# Patient Record
Sex: MALE | Race: WHITE | Employment: FULL TIME | URBAN - METROPOLITAN AREA
[De-identification: names, ages, dates, MRNs, and addresses within clinical notes are randomized per-mention and may not be internally consistent; named-entity substitution may affect disease eponyms.]

---

## 2019-02-27 ENCOUNTER — OFFICE VISIT (OUTPATIENT)
Dept: FAMILY MEDICINE CLINIC | Age: 54
End: 2019-02-27
Payer: COMMERCIAL

## 2019-02-27 VITALS
WEIGHT: 215 LBS | HEIGHT: 68 IN | DIASTOLIC BLOOD PRESSURE: 82 MMHG | BODY MASS INDEX: 32.58 KG/M2 | HEART RATE: 101 BPM | RESPIRATION RATE: 16 BRPM | OXYGEN SATURATION: 95 % | SYSTOLIC BLOOD PRESSURE: 124 MMHG | TEMPERATURE: 96.6 F

## 2019-02-27 DIAGNOSIS — Z00.00 ROUTINE GENERAL MEDICAL EXAMINATION AT A HEALTH CARE FACILITY: Primary | ICD-10-CM

## 2019-02-27 DIAGNOSIS — K14.8 TONGUE LESION: ICD-10-CM

## 2019-02-27 DIAGNOSIS — R53.83 OTHER FATIGUE: ICD-10-CM

## 2019-02-27 DIAGNOSIS — K13.70 MOUTH LESION: ICD-10-CM

## 2019-02-27 DIAGNOSIS — Z13.220 SCREENING FOR HYPERLIPIDEMIA: ICD-10-CM

## 2019-02-27 DIAGNOSIS — R40.0 SOMNOLENCE: ICD-10-CM

## 2019-02-27 DIAGNOSIS — Z23 NEED FOR PROPHYLACTIC VACCINATION AND INOCULATION AGAINST VARICELLA: ICD-10-CM

## 2019-02-27 DIAGNOSIS — E66.09 CLASS 1 OBESITY DUE TO EXCESS CALORIES WITH SERIOUS COMORBIDITY AND BODY MASS INDEX (BMI) OF 32.0 TO 32.9 IN ADULT: ICD-10-CM

## 2019-02-27 DIAGNOSIS — R06.83 SNORING: ICD-10-CM

## 2019-02-27 PROCEDURE — 99213 OFFICE O/P EST LOW 20 MIN: CPT | Performed by: INTERNAL MEDICINE

## 2019-02-27 PROCEDURE — 99396 PREV VISIT EST AGE 40-64: CPT | Performed by: INTERNAL MEDICINE

## 2019-02-27 ASSESSMENT — PATIENT HEALTH QUESTIONNAIRE - PHQ9
SUM OF ALL RESPONSES TO PHQ QUESTIONS 1-9: 0
SUM OF ALL RESPONSES TO PHQ QUESTIONS 1-9: 0
2. FEELING DOWN, DEPRESSED OR HOPELESS: 0
1. LITTLE INTEREST OR PLEASURE IN DOING THINGS: 0
SUM OF ALL RESPONSES TO PHQ9 QUESTIONS 1 & 2: 0

## 2019-02-28 ASSESSMENT — ENCOUNTER SYMPTOMS
DIARRHEA: 0
CHEST TIGHTNESS: 0
SHORTNESS OF BREATH: 0
STRIDOR: 0
CONSTIPATION: 0
ABDOMINAL PAIN: 0
WHEEZING: 0
CHOKING: 0
BACK PAIN: 0
COUGH: 0

## 2019-03-01 LAB
ALBUMIN SERPL-MCNC: 4.4 G/DL
ALP BLD-CCNC: 96 U/L
ALT SERPL-CCNC: 51 U/L
ANION GAP SERPL CALCULATED.3IONS-SCNC: 9 MMOL/L
AST SERPL-CCNC: 24 U/L
BASOPHILS ABSOLUTE: NORMAL /ΜL
BASOPHILS RELATIVE PERCENT: NORMAL %
BILIRUB SERPL-MCNC: 0.7 MG/DL (ref 0.1–1.4)
BUN BLDV-MCNC: 13 MG/DL
CALCIUM SERPL-MCNC: 9.3 MG/DL
CHLORIDE BLD-SCNC: 102 MMOL/L
CHOLESTEROL, FASTING: 187
CO2: 28 MMOL/L
CREAT SERPL-MCNC: 0.98 MG/DL
EOSINOPHILS ABSOLUTE: NORMAL /ΜL
EOSINOPHILS RELATIVE PERCENT: NORMAL %
GFR CALCULATED: ABNORMAL
GLUCOSE BLD-MCNC: 210 MG/DL
HCT VFR BLD CALC: NORMAL % (ref 41–53)
HDLC SERPL-MCNC: 26 MG/DL (ref 35–70)
HEMOGLOBIN: NORMAL G/DL (ref 13.5–17.5)
LDL CHOLESTEROL CALCULATED: 123 MG/DL (ref 0–160)
LYMPHOCYTES ABSOLUTE: NORMAL /ΜL
LYMPHOCYTES RELATIVE PERCENT: NORMAL %
MCH RBC QN AUTO: NORMAL PG
MCHC RBC AUTO-ENTMCNC: NORMAL G/DL
MCV RBC AUTO: NORMAL FL
MONOCYTES ABSOLUTE: NORMAL /ΜL
MONOCYTES RELATIVE PERCENT: NORMAL %
NEUTROPHILS ABSOLUTE: NORMAL /ΜL
NEUTROPHILS RELATIVE PERCENT: NORMAL %
PLATELET # BLD: NORMAL K/ΜL
PMV BLD AUTO: NORMAL FL
POTASSIUM SERPL-SCNC: 4.2 MMOL/L
PROSTATE SPECIFIC ANTIGEN: 1.1 NG/ML
RBC # BLD: NORMAL 10^6/ΜL
SODIUM BLD-SCNC: 139 MMOL/L
TOTAL PROTEIN: 6.9
TRIGLYCERIDE, FASTING: 190
WBC # BLD: NORMAL 10^3/ML

## 2019-03-08 DIAGNOSIS — Z00.00 ROUTINE GENERAL MEDICAL EXAMINATION AT A HEALTH CARE FACILITY: ICD-10-CM

## 2019-03-08 DIAGNOSIS — Z13.220 SCREENING FOR HYPERLIPIDEMIA: ICD-10-CM

## 2019-03-10 DIAGNOSIS — R73.9 HYPERGLYCEMIA: Primary | ICD-10-CM

## 2019-03-11 ENCOUNTER — TELEPHONE (OUTPATIENT)
Dept: FAMILY MEDICINE CLINIC | Age: 54
End: 2019-03-11

## 2019-07-17 ENCOUNTER — OFFICE VISIT (OUTPATIENT)
Dept: FAMILY MEDICINE CLINIC | Age: 54
End: 2019-07-17
Payer: COMMERCIAL

## 2019-07-17 ENCOUNTER — HOSPITAL ENCOUNTER (OUTPATIENT)
Age: 54
Setting detail: SPECIMEN
Discharge: HOME | End: 2019-07-17
Payer: COMMERCIAL

## 2019-07-17 VITALS
HEART RATE: 92 BPM | OXYGEN SATURATION: 94 % | WEIGHT: 220.2 LBS | HEIGHT: 68 IN | SYSTOLIC BLOOD PRESSURE: 130 MMHG | DIASTOLIC BLOOD PRESSURE: 88 MMHG | TEMPERATURE: 97.7 F | RESPIRATION RATE: 16 BRPM | BODY MASS INDEX: 33.37 KG/M2

## 2019-07-17 DIAGNOSIS — G47.33 OBSTRUCTIVE SLEEP APNEA: Primary | ICD-10-CM

## 2019-07-17 DIAGNOSIS — R73.9 HYPERGLYCEMIA: ICD-10-CM

## 2019-07-17 PROCEDURE — 99213 OFFICE O/P EST LOW 20 MIN: CPT | Performed by: INTERNAL MEDICINE

## 2019-07-17 ASSESSMENT — ENCOUNTER SYMPTOMS
DIARRHEA: 0
TROUBLE SWALLOWING: 0
SHORTNESS OF BREATH: 0
NAUSEA: 0
APNEA: 1
BACK PAIN: 0
CHOKING: 0
ABDOMINAL PAIN: 0
CONSTIPATION: 0
VOICE CHANGE: 0
STRIDOR: 0
COUGH: 0
VOMITING: 0
BLOOD IN STOOL: 0

## 2019-07-18 LAB
ESTIMATED AVERAGE GLUCOSE: 258 MG/DL
HBA1C MFR BLD: 10.6 % (ref 4–6)

## 2019-07-18 NOTE — PROGRESS NOTES
sleep study and sleep medicine  Dx; severe sleep apnea    Given cpap script but we will fax one to [facility] as well to get process rolling   Will likely need to fill out more paper work   Will try to get nasal device approved   Pt would be great candidate for this     Pt will also check A1C  Reprinted out lab   Will jhonatan with results   Reviewed depending pt eager to adjust diet and /lifestyle /exercise  May need dietician referral and diabetic education to [facility]   Will cut out all po, processed foods and can recheck a1c here in office/script ie lab slip in 3 months   Jhonatan with q/c    Patientgiven educational materials - see patient instructions. Discussed use, benefit,and side effects of prescribed medications. All patient questions answered. Ptvoiced understanding. Reviewed health maintenance. Instructed to continue currentmedications, diet and exercise. Patient agreed with treatment plan. Follow up asdirected.      Electronically signed by Kerrie Salazar DO on 7/17/2019 at 10:26 PM

## 2019-07-25 DIAGNOSIS — G47.33 OBSTRUCTIVE SLEEP APNEA: Primary | ICD-10-CM

## 2019-11-13 ENCOUNTER — TELEPHONE (OUTPATIENT)
Dept: FAMILY MEDICINE CLINIC | Age: 54
End: 2019-11-13

## 2019-11-22 ENCOUNTER — OFFICE VISIT (OUTPATIENT)
Dept: FAMILY MEDICINE CLINIC | Age: 54
End: 2019-11-22
Payer: COMMERCIAL

## 2019-11-22 VITALS
BODY MASS INDEX: 32.49 KG/M2 | HEIGHT: 68 IN | RESPIRATION RATE: 16 BRPM | SYSTOLIC BLOOD PRESSURE: 126 MMHG | WEIGHT: 214.4 LBS | TEMPERATURE: 96.2 F | DIASTOLIC BLOOD PRESSURE: 80 MMHG | HEART RATE: 88 BPM | OXYGEN SATURATION: 95 %

## 2019-11-22 DIAGNOSIS — E13.9 OTHER SPECIFIED DIABETES MELLITUS WITHOUT COMPLICATION, WITHOUT LONG-TERM CURRENT USE OF INSULIN (HCC): Primary | ICD-10-CM

## 2019-11-22 DIAGNOSIS — G47.33 OBSTRUCTIVE SLEEP APNEA: ICD-10-CM

## 2019-11-22 LAB — HBA1C MFR BLD: 10.9 %

## 2019-11-22 PROCEDURE — 82044 UR ALBUMIN SEMIQUANTITATIVE: CPT | Performed by: INTERNAL MEDICINE

## 2019-11-22 PROCEDURE — 83036 HEMOGLOBIN GLYCOSYLATED A1C: CPT | Performed by: INTERNAL MEDICINE

## 2019-11-22 PROCEDURE — 99213 OFFICE O/P EST LOW 20 MIN: CPT | Performed by: INTERNAL MEDICINE

## 2019-11-22 ASSESSMENT — ENCOUNTER SYMPTOMS
TROUBLE SWALLOWING: 0
CONSTIPATION: 0
CHEST TIGHTNESS: 0
SHORTNESS OF BREATH: 0
PHOTOPHOBIA: 0
VOICE CHANGE: 0
ABDOMINAL PAIN: 0
VISUAL CHANGE: 0
WHEEZING: 0
CHOKING: 0
STRIDOR: 0
COUGH: 0
DIARRHEA: 0

## 2019-12-03 ENCOUNTER — HOSPITAL ENCOUNTER (OUTPATIENT)
Dept: DIABETES SERVICES | Age: 54
Setting detail: THERAPIES SERIES
Discharge: HOME | End: 2019-12-03
Payer: COMMERCIAL

## 2019-12-03 PROCEDURE — G0108 DIAB MANAGE TRN  PER INDIV: HCPCS

## 2019-12-03 SDOH — ECONOMIC STABILITY: FOOD INSECURITY: ADDITIONAL INFORMATION: NO

## 2019-12-03 ASSESSMENT — PROBLEM AREAS IN DIABETES QUESTIONNAIRE (PAID)
FEELING SCARED WHEN YOU THINK ABOUT LIVING WITH DIABETES: 1
PAID-5 TOTAL SCORE: 1
FEELING THAT DIABETES IS TAKING UP TOO MUCH OF YOUR MENTAL AND PHYSICAL ENERGY EVERY DAY: 0
FEELING DEPRESSED WHEN YOU THINK ABOUT LIVING WITH DIABETES: 0
WORRYING ABOUT THE FUTURE AND THE POSSIBILITY OF SERIOUS COMPLICATIONS: 0
COPING WITH COMPLICATIONS OF DIABETES: 0

## 2020-01-03 ENCOUNTER — OFFICE VISIT (OUTPATIENT)
Dept: FAMILY MEDICINE CLINIC | Age: 55
End: 2020-01-03
Payer: COMMERCIAL

## 2020-01-03 VITALS
WEIGHT: 215.4 LBS | TEMPERATURE: 97.7 F | BODY MASS INDEX: 32.64 KG/M2 | HEIGHT: 68 IN | SYSTOLIC BLOOD PRESSURE: 132 MMHG | DIASTOLIC BLOOD PRESSURE: 80 MMHG | OXYGEN SATURATION: 96 % | RESPIRATION RATE: 16 BRPM | HEART RATE: 72 BPM

## 2020-01-03 LAB — HBA1C MFR BLD: 9.2 %

## 2020-01-03 PROCEDURE — 99213 OFFICE O/P EST LOW 20 MIN: CPT | Performed by: INTERNAL MEDICINE

## 2020-01-03 PROCEDURE — 83036 HEMOGLOBIN GLYCOSYLATED A1C: CPT | Performed by: INTERNAL MEDICINE

## 2020-01-03 PROCEDURE — 3046F HEMOGLOBIN A1C LEVEL >9.0%: CPT | Performed by: INTERNAL MEDICINE

## 2020-01-03 ASSESSMENT — ENCOUNTER SYMPTOMS
BLOOD IN STOOL: 0
CHOKING: 0
DIARRHEA: 0
COUGH: 0
STRIDOR: 0
CONSTIPATION: 0
ABDOMINAL DISTENTION: 0
SHORTNESS OF BREATH: 0
NAUSEA: 0
CHEST TIGHTNESS: 0
ABDOMINAL PAIN: 0

## 2020-01-03 ASSESSMENT — PATIENT HEALTH QUESTIONNAIRE - PHQ9
SUM OF ALL RESPONSES TO PHQ QUESTIONS 1-9: 0
2. FEELING DOWN, DEPRESSED OR HOPELESS: 0
SUM OF ALL RESPONSES TO PHQ9 QUESTIONS 1 & 2: 0
SUM OF ALL RESPONSES TO PHQ QUESTIONS 1-9: 0
1. LITTLE INTEREST OR PLEASURE IN DOING THINGS: 0

## 2020-01-29 RX ORDER — LANCETS 30 GAUGE
1 EACH MISCELLANEOUS DAILY
Qty: 100 EACH | Refills: 5 | Status: SHIPPED | OUTPATIENT
Start: 2020-01-29

## 2020-01-29 RX ORDER — BLOOD-GLUCOSE METER
1 KIT MISCELLANEOUS DAILY
Qty: 1 KIT | Refills: 0 | Status: SHIPPED | OUTPATIENT
Start: 2020-01-29

## 2020-02-26 LAB
AVERAGE GLUCOSE: 148
HBA1C MFR BLD: 6.8 %

## 2020-03-04 ENCOUNTER — HOSPITAL ENCOUNTER (OUTPATIENT)
Dept: DIABETES SERVICES | Age: 55
Setting detail: THERAPIES SERIES
Discharge: HOME | End: 2020-03-04
Payer: COMMERCIAL

## 2020-03-04 PROCEDURE — G0109 DIAB MANAGE TRN IND/GROUP: HCPCS

## 2020-03-05 NOTE — PROGRESS NOTES
Diabetes Self- Management Education Program Assessment -   Also see Diabetic Screening  Patient, Britany Carrera,  here for diabetes self-management education  visit/ assessment. Today's visit was in an individual setting. Diet History :  Diet Questionnaire and typical meal /portion sheet completed  [x]Yes  [] NO    Goals setting:  Goal work sheet completed  [x]Yes  [] NO  (SEE  EDUCATION AND GOALS FLOWSHEET)    MEDICAL HISTORY:  No past medical history on file. Family History   Problem Relation Age of Onset    No Known Problems Mother     No Known Problems Father      Patient has no known allergies. Immunization History   Administered Date(s) Administered    Influenza Virus Vaccine 10/07/2014, 10/07/2018, 01/18/2020    Influenza, Quadv, IM, PF (6 mo and older Fluzone, Flulaval, Fluarix, and 3 yrs and older Afluria) 09/19/2018, 09/24/2019    Tdap (Boostrix, Adacel) 10/07/2014    Zoster Recombinant (Shingrix) 05/10/2019     Current Medications  Current Outpatient Medications   Medication Sig Dispense Refill    glucose monitoring kit (FREESTYLE) monitoring kit 1 kit by Does not apply route daily 1 kit 0    blood glucose test strips (ACCU-CHEK COMPACT TEST DRUM) strip 1 each by In Vitro route daily As needed.  100 each 3    Lancets MISC 1 each by Does not apply route daily 100 each 5    metFORMIN (GLUCOPHAGE) 500 MG tablet Take 1 tablet by mouth 2 times daily (with meals) 60 tablet 5    Respiratory Therapy Supplies KEILY 1 Device by Does not apply route nightly NASAL CPAP per recommendations of sleep medicine physician Dx : severe sleep apnea 1 Device 0     No current facility-administered medications for this encounter.    :     Comments:  Allergies:  No Known Allergies  Diabetes 5  / Health Status    A1C blood level - at goal < 7%   Lab Results   Component Value Date    LABA1C 6.8 02/26/2020    LABA1C 9.2 01/03/2020    LABA1C 10.9 11/22/2019     Lab Results   Component Value Date    LDLCALC 123 03/01/2019    CREATININE 0.98 03/01/2019       Blood pressure ( 130/ 80)  Or less  BP Readings from Last 3 Encounters:   01/03/20 132/80   11/22/19 126/80   07/17/19 130/88        Cholesterol ( LDL under  100)   Lab Results   Component Value Date    LDLCALC 123 03/01/2019       4 . Smoking ? []Yes   [x]No    5. Taking an Asprin daily? []Yes   [x]No          Diabetes Self- Management Education Record    Participant Name: Eliezer Palma  Referring Provider: Deng Grullon DO   Assessment/Evaluation Ratings:  1=Needs Instruction   4=Demonstrates Understanding/Competency  2=Needs Review   NC=Not Covered    3=Comprehends Key Points  N/A=Not Applicable  Topics/Learning Objectives Pre-session Assess Date:  12/3/19 rs Instr. Date Reinforce Date Post- session Eval Comments   Diabetes disease process & Treatment process: Define diabetes & pre-diabetes; Identify own type of diabetes; role of the pancreas; signs/symptoms; diagnostic criteria; prevention & treatment options; contributing factors. 1 3/4/20 MARILEE   Was told BG up last fall 2018 at work place health screen - followed up with PCP July 2019 and A1C was 10.6 - follow up in Nov and A1c was 10.9% - stated no health changes and did not feel like he had any thing wrong with health but now more accepting and see need to make lifestyle changes - hopes with lifestyle change Diabetes will be controlled   Incorporating nutritional management into lifestyle: Describe effect of type, amount & timing of food on blood glucose; Describe basic meal planning techniques & current nutrition guideline   1  He stated he was \"eating bad\" most of life - drinking pop mid day at work and out to eat meals and large portions - he had had weight gain over time. He had started to cut back on regular pop and now just occasional mid day at work or on weekend will have regular pop.      Prior big meat and starchy potatoes - rice and bread ect - now trying to add in non starchy veggies  / eats out meals few times per week also     Patterns of meals - BK daily and Lunch at regular times - but dinner is late 8 -9 pm .    snacks mid day - now changed to nuts     Discussed how to make smoothies with less fruit - try 1 cup berries + greek yougart ( was having 2 whole banana + apple + berries in am smoothie - about 180 gram CHO in BK)       What to eat - Food groups, When to eat - timing of meals and snacks, and How much to eat - portions control.-  Reviewed in general concepts and given CHO counting and meal planning booklet and the general target of 2000 ada / 60 - 75 grams CHO with meals as well as how to use plate method for portion control and add in more low CHO veggies-  Given web site to [employer] 12/3/19        calories/ day   CHO choices/ meal   CHO choices/  day   grams of protein /day   gram of fat /day     Correctly read food labels & demonstrate CHO counting & portion control with personalized meal plan. Identify dining out strategies, & dietary sick day guidelines. 1  Starting to look at total CHO - did not know CHO food groups       Incorporating physical activity into lifestyle:   Verbalize effect of exercise on blood glucose levels; benefits of regular exercise; safety considerations; contraindications; maintenance of activity. 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    1       Monitoring blood glucose, interpreting and using results:  Identify recommended & personal blood glucose targets; importance of testing; testing supplies; HgbA1C target levels; Factors affecting blood glucose; Importance of logging blood glucose levels for pattern recognition; ketone testing; safe lancet disposal.   1 3/4/20 JW   Following A1C only at this time - discussed home BG checks and does not want to start at this time. 3/4/20 JW A1c has dropped to 6.8 % with changes made. Prevention, detection & treatment of acute complications:  Identify symptoms of hyper & hypoglycemia, and prevention & treatment strategies. 1 3/4/20 JW   Stated weight loss  ( highest wt 220 lbs)   Wt Readings from Last 3 Encounters:   01/03/20 215 lb 6.4 oz (97.7 kg)   11/22/19 214 lb 6.4 oz (97.3 kg)   07/17/19 220 lb 3.2 oz (99.9 kg)    no other symptoms of high or low BG        Describe sick day guidelines & indications for  physician notification. Identify short term consequences of poor control. 1       Prevention, detection & treatment of chronic complications:  Define the natural course of diabetes & describe the relationship of blood glucose levels to long term complications of diabetes. Identify preventative measures & standards of care. 1    Need a foot exam this year  Stated family high cholesterol - not on any medications  No B/p or asa     Developing strategies to address psychosocial issues:  Describe feelings about living with diabetes; Describe how stress, depression & anxiety affect blood glucose; Identify coping strategies; Identify support needed & support network available. 1 3/4/20 JW   Coping with diagnosis   Wife is support    Developing strategies to promote health/change behavior: Identify 7 self-care behaviors; Personal health risk factors; Benefits, challenges & strategies for behavioral change;    1         Individualized goal selection. My goal , to help me improve my health, I will: 1. Health eating :   Follow plate method for meal planning and portion control and [facility] - EYE, FOOT, CARDIAC, WOUND, WEIGHT MANAGEMENT        []Other  Coby Clark, RD, LD CDE

## 2020-03-11 ENCOUNTER — HOSPITAL ENCOUNTER (OUTPATIENT)
Dept: DIABETES SERVICES | Age: 55
Setting detail: THERAPIES SERIES
Discharge: HOME | End: 2020-03-11
Payer: COMMERCIAL

## 2020-03-12 PROCEDURE — G0109 DIAB MANAGE TRN IND/GROUP: HCPCS

## 2020-03-12 NOTE — PROGRESS NOTES
meals few times per week also     Patterns of meals - BK daily and Lunch at regular times - but dinner is late 8 -9 pm .    snacks mid day - now changed to nuts     Discussed how to make smoothies with less fruit - try 1 cup berries + greek yougart ( was having 2 whole banana + apple + berries in am smoothie - about 180 gram CHO in BK)    3/11/20 JW   What to eat - Food groups, When to eat - timing of meals and snacks, and How much to eat - portions control.-  Reviewed in general concepts and given CHO counting and meal planning booklet and the general target of 2000 ada / 60 - 75 grams CHO with meals as well as how to use plate method for portion control and add in more low CHO veggies-  Given web site to [employer] 12/3/19   1800 calories/ day   CHO choices/ meal 4,0-1,4,1,4,1 or move hs snack to before supper if supper late. CHO choices/  day   grams of protein /day   gram of fat /day     Correctly read food labels & demonstrate CHO counting & portion control with personalized meal plan. Identify dining out strategies, & dietary sick day guidelines. 1  Starting to look at total CHO - did not know CHO food groups 3/11/20 JW      Incorporating physical activity into lifestyle:   Verbalize effect of exercise on blood glucose levels; benefits of regular exercise; safety considerations; contraindications; maintenance of activity. 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    1

## 2020-03-18 ENCOUNTER — APPOINTMENT (OUTPATIENT)
Dept: DIABETES SERVICES | Age: 55
End: 2020-03-18
Payer: COMMERCIAL

## 2020-04-08 ENCOUNTER — TELEPHONE (OUTPATIENT)
Dept: FAMILY MEDICINE CLINIC | Age: 55
End: 2020-04-08

## 2020-04-08 NOTE — TELEPHONE ENCOUNTER
Called patient to switch to VV and he said he didn't really need it but wants you to put an order for a1c and cholesterol

## 2020-04-10 ENCOUNTER — HOSPITAL ENCOUNTER (OUTPATIENT)
Age: 55
Setting detail: SPECIMEN
Discharge: HOME | End: 2020-04-10
Payer: COMMERCIAL

## 2020-04-10 LAB
CHOLESTEROL/HDL RATIO: 5.2
CHOLESTEROL: 203 MG/DL
HDLC SERPL-MCNC: 39 MG/DL
LDL CHOLESTEROL: 133 MG/DL (ref 0–130)
TRIGL SERPL-MCNC: 154 MG/DL
VLDLC SERPL CALC-MCNC: ABNORMAL MG/DL (ref 1–30)

## 2020-04-12 LAB
ESTIMATED AVERAGE GLUCOSE: 131 MG/DL
HBA1C MFR BLD: 6.2 % (ref 4–6)

## 2020-04-13 RX ORDER — ATORVASTATIN CALCIUM 20 MG/1
20 TABLET, FILM COATED ORAL DAILY
Qty: 30 TABLET | Refills: 5 | Status: SHIPPED | OUTPATIENT
Start: 2020-04-13 | End: 2020-11-10

## 2020-05-20 LAB — DIABETIC RETINOPATHY: NEGATIVE

## 2020-07-29 ENCOUNTER — TELEPHONE (OUTPATIENT)
Dept: ADMINISTRATIVE | Age: 55
End: 2020-07-29

## 2020-07-29 DIAGNOSIS — E11.9 TYPE 2 DIABETES MELLITUS WITHOUT COMPLICATION, WITHOUT LONG-TERM CURRENT USE OF INSULIN (HCC): Primary | ICD-10-CM

## 2020-07-31 ENCOUNTER — HOSPITAL ENCOUNTER (OUTPATIENT)
Age: 55
Setting detail: SPECIMEN
Discharge: HOME | End: 2020-07-31
Payer: COMMERCIAL

## 2020-07-31 LAB
CHOLESTEROL/HDL RATIO: 4
CHOLESTEROL: 160 MG/DL
HDLC SERPL-MCNC: 40 MG/DL
LDL CHOLESTEROL: 97 MG/DL (ref 0–130)
TRIGL SERPL-MCNC: 115 MG/DL
VLDLC SERPL CALC-MCNC: ABNORMAL MG/DL (ref 1–30)

## 2020-08-02 LAB
ESTIMATED AVERAGE GLUCOSE: 128 MG/DL
HBA1C MFR BLD: 6.1 % (ref 4–6)

## 2020-11-10 RX ORDER — ATORVASTATIN CALCIUM 20 MG/1
TABLET, FILM COATED ORAL
Qty: 30 TABLET | Refills: 4 | Status: SHIPPED | OUTPATIENT
Start: 2020-11-10 | End: 2021-04-26

## 2020-12-04 ENCOUNTER — OFFICE VISIT (OUTPATIENT)
Dept: FAMILY MEDICINE CLINIC | Age: 55
End: 2020-12-04
Payer: COMMERCIAL

## 2020-12-04 ENCOUNTER — HOSPITAL ENCOUNTER (OUTPATIENT)
Age: 55
Setting detail: SPECIMEN
Discharge: HOME | End: 2020-12-04
Payer: COMMERCIAL

## 2020-12-04 VITALS
TEMPERATURE: 97.4 F | SYSTOLIC BLOOD PRESSURE: 122 MMHG | DIASTOLIC BLOOD PRESSURE: 76 MMHG | RESPIRATION RATE: 16 BRPM | WEIGHT: 195 LBS | HEART RATE: 89 BPM | BODY MASS INDEX: 29.55 KG/M2 | OXYGEN SATURATION: 98 % | HEIGHT: 68 IN

## 2020-12-04 PROCEDURE — 99213 OFFICE O/P EST LOW 20 MIN: CPT | Performed by: NURSE PRACTITIONER

## 2020-12-04 ASSESSMENT — ENCOUNTER SYMPTOMS
SHORTNESS OF BREATH: 0
VOMITING: 0
DIARRHEA: 0
EYE PAIN: 0
BACK PAIN: 0
COUGH: 1
NAUSEA: 0
ABDOMINAL PAIN: 0
SORE THROAT: 0
CHEST TIGHTNESS: 1
RHINORRHEA: 1
SINUS PAIN: 0

## 2020-12-04 NOTE — PROGRESS NOTES
[address] [facility]  [address] [facility] [address] [address]  Dept: [phone]  Dept Fax: [phone]    Bobby Almonte is a 63 y.o. male who presents to the urgent care today for his medicalconditions/complaints as noted below. Bobby Almonte is c/o of Chest Congestion (states his 3 grandkids are sick ); Sinusitis; and Fatigue      HPI:         41-year-old male patient presents with complaint of multiple symptoms. Reports for approximately 1 week he has had chest congestion, nasal congestion, rhinorrhea, generalized fatigue and mild chest tightness with slight cough. Denies fevers or chills. Denies chest pains or shortness of breath. Denies vomiting diarrhea. Denies loss of taste smell. Reports positive sick contact with his grandchildren. Treatments tried include NyQuil. Known history of diabetes and hyperlipidemia. Past Medical History:   Diagnosis Date    DM (diabetes mellitus) ([facility] [address]     Hyperlipidemia         Current Outpatient Medications   Medication Sig Dispense Refill    atorvastatin (LIPITOR) 20 MG tablet TAKE ONE TABLET BY MOUTH DAILY 30 tablet 4    metFORMIN (GLUCOPHAGE) 500 MG tablet Take 1 tablet by mouth 2 times daily (with meals) 60 tablet 5    glucose monitoring kit (FREESTYLE) monitoring kit 1 kit by Does not apply route daily 1 kit 0    blood glucose test strips (ACCU-CHEK COMPACT TEST DRUM) strip 1 each by In Vitro route daily As needed. 100 each 3    Lancets MISC 1 each by Does not apply route daily 100 each 5    Respiratory Therapy Supplies KEILY 1 Device by Does not apply route nightly NASAL CPAP per recommendations of sleep medicine physician Dx : severe sleep apnea 1 Device 0     No current facility-administered medications for this visit. No Known Allergies    Subjective:      Review of Systems   Constitutional: Positive for fatigue. Negative for chills and fever. HENT: Positive for congestion and rhinorrhea. Negative for ear pain, sinus pain and sore throat. Eyes: Negative for pain and visual disturbance. Respiratory: Positive for cough and chest tightness. Negative for shortness of breath. Cardiovascular: Negative for chest pain and palpitations. Gastrointestinal: Negative for abdominal pain, diarrhea, nausea and vomiting. Genitourinary: Negative for penile pain and testicular pain. Musculoskeletal: Negative for back pain, joint swelling and neck pain. Skin: Negative for rash. Neurological: Negative for dizziness and light-headedness. Hematological: Does not bruise/bleed easily. All other systems reviewed and are negative. Objective:     Physical Exam  Vitals signs and nursing note reviewed. Constitutional:       General: He is not in acute distress. Appearance: Normal appearance. He is not toxic-appearing. HENT:      Nose: Congestion and rhinorrhea present. Mouth/Throat:      Mouth: Mucous membranes are moist.      Pharynx: No posterior oropharyngeal erythema. Cardiovascular:      Rate and Rhythm: Normal rate. Pulmonary:      Effort: Pulmonary effort is normal.      Breath sounds: Normal breath sounds. Skin:     General: Skin is warm and dry. Neurological:      General: No focal deficit present. Mental Status: He is alert and oriented to person, place, and time. /76 (Site: Left Upper Arm, Position: Sitting, Cuff Size: Large Adult)   Pulse 89   Temp 97.4 °F (36.3 °C) (Tympanic)   Resp 16   Ht 5' 8\" (1.727 m)   Wt 195 lb (88.5 kg)   SpO2 98%   BMI 29.65 kg/m²   Lab Review   No visits with results within 2 Month(s) from this visit.    Latest known visit with results is:   Hospital Outpatient Visit on 07/31/2020   Component Date Value    Cholesterol 07/31/2020 160     HDL 07/31/2020 40*    LDL Cholesterol 07/31/2020 97     Chol/HDL Ratio 07/31/2020 4.0     Triglycerides 07/31/2020 115     VLDL 07/31/2020 NOT REPORTED     Hemoglobin A1C 07/31/2020 6.1*    Estimated Avg Glucose 07/31/2020 128        Assessment:       Diagnosis Orders   1. Nasal congestion  COVID-19 Ambulatory    EKG 12 Lead    XR CHEST STANDARD (2 VW)    D-Dimer, Quantitative   2. Chest tightness  COVID-19 Ambulatory    EKG 12 Lead    XR CHEST STANDARD (2 VW)    D-Dimer, Quantitative       Plan:      Return if symptoms worsen or fail to improve. No orders of the defined types were placed in this encounter. Will order chest xray, ekg, dimer  Recommend isolation pending covid results. Discussed treatment regimen to include rest, hydration, tylenol prn. Discussed deep breathing exercises. Discussed to monitor for progression of symptoms. Follow up as needed. Will contact patient for results  ER for acutely worsening symptoms. Patient given educational materials - see patient instructions. Discussed use, benefit, and side effects of prescribed medications. All patientquestions answered. Pt voiced understanding. This note was transcribed using dictation with Dragon services. Efforts were made to correct any errors but some words may be misinterpreted.      Electronically signed by CLARY Cabrera CNP on 12/4/2020at 9:06 AM

## 2020-12-07 LAB — SARS-COV-2, NAA: NOT DETECTED

## 2021-04-26 RX ORDER — ATORVASTATIN CALCIUM 20 MG/1
TABLET, FILM COATED ORAL
Qty: 30 TABLET | Refills: 3 | Status: SHIPPED | OUTPATIENT
Start: 2021-04-26 | End: 2021-09-10 | Stop reason: SDUPTHER

## 2021-07-26 ENCOUNTER — TELEPHONE (OUTPATIENT)
Dept: FAMILY MEDICINE CLINIC | Age: 56
End: 2021-07-26

## 2021-07-26 DIAGNOSIS — E11.9 DIABETES MELLITUS WITHOUT COMPLICATION (HCC): Primary | ICD-10-CM

## 2021-07-26 DIAGNOSIS — Z12.5 PROSTATE CANCER SCREENING: ICD-10-CM

## 2021-07-26 DIAGNOSIS — Z13.220 LIPID SCREENING: ICD-10-CM

## 2021-07-26 NOTE — TELEPHONE ENCOUNTER
----- Message from Chantal Sharma sent at 7/26/2021 12:39 PM EDT -----  Subject: Referral Request    QUESTIONS   Reason for referral request? Requesting blood work for appointment on the   3rd   Has the physician seen you for this condition before? No   Preferred Specialist (if applicable)? Do you already have an appointment scheduled? No  Additional Information for Provider? Requesting blood work for appointment   on the 3rd  ---------------------------------------------------------------------------  --------------  [address]  What is the best way for the office to contact you? OK to leave message on   voicemail  Preferred Call Back Phone Number?  [phone]

## 2021-07-26 NOTE — TELEPHONE ENCOUNTER
----- Message from Edmond Rubio sent at 7/26/2021  4:49 PM EDT -----  Subject: Message to Provider    QUESTIONS  Information for Provider? Patient missed a call from the office and would   like a call back. ---------------------------------------------------------------------------  --------------  [facility]  What is the best way for the office to contact you? OK to leave message on   voicemail  Preferred Call Back Phone Number?  [phone]  ---------------------------------------------------------------------------  --------------  SCRIPT ANSWERS  undefined

## 2021-07-30 ENCOUNTER — HOSPITAL ENCOUNTER (OUTPATIENT)
Age: 56
Setting detail: SPECIMEN
Discharge: HOME | End: 2021-07-30
Payer: COMMERCIAL

## 2021-07-30 DIAGNOSIS — Z13.220 LIPID SCREENING: ICD-10-CM

## 2021-07-30 DIAGNOSIS — Z12.5 PROSTATE CANCER SCREENING: ICD-10-CM

## 2021-07-30 DIAGNOSIS — E11.9 DIABETES MELLITUS WITHOUT COMPLICATION (HCC): ICD-10-CM

## 2021-07-30 LAB
ABSOLUTE EOS #: 0.16 K/UL (ref 0–0.44)
ABSOLUTE IMMATURE GRANULOCYTE: 0.04 K/UL (ref 0–0.3)
ABSOLUTE LYMPH #: 1.62 K/UL (ref 1.1–3.7)
ABSOLUTE MONO #: 0.61 K/UL (ref 0.1–1.2)
ALBUMIN SERPL-MCNC: 4.3 G/DL (ref 3.5–5.2)
ALBUMIN/GLOBULIN RATIO: 1.7 (ref 1–2.5)
ALP BLD-CCNC: 82 U/L (ref 40–129)
ALT SERPL-CCNC: 32 U/L (ref 5–41)
ANION GAP SERPL CALCULATED.3IONS-SCNC: 14 MMOL/L (ref 9–17)
AST SERPL-CCNC: 18 U/L
BASOPHILS # BLD: 1 % (ref 0–2)
BASOPHILS ABSOLUTE: 0.07 K/UL (ref 0–0.2)
BILIRUB SERPL-MCNC: 0.61 MG/DL (ref 0.3–1.2)
BUN BLDV-MCNC: 13 MG/DL (ref 6–20)
BUN/CREAT BLD: ABNORMAL (ref 9–20)
CALCIUM SERPL-MCNC: 9.4 MG/DL (ref 8.6–10.4)
CHLORIDE BLD-SCNC: 104 MMOL/L (ref 98–107)
CHOLESTEROL/HDL RATIO: 4.2
CHOLESTEROL: 177 MG/DL
CO2: 23 MMOL/L (ref 20–31)
CREAT SERPL-MCNC: 0.88 MG/DL (ref 0.7–1.2)
DIFFERENTIAL TYPE: ABNORMAL
EOSINOPHILS RELATIVE PERCENT: 2 % (ref 1–4)
ESTIMATED AVERAGE GLUCOSE: 154 MG/DL
GFR AFRICAN AMERICAN: >60 ML/MIN
GFR NON-AFRICAN AMERICAN: >60 ML/MIN
GFR SERPL CREATININE-BSD FRML MDRD: ABNORMAL ML/MIN/{1.73_M2}
GFR SERPL CREATININE-BSD FRML MDRD: ABNORMAL ML/MIN/{1.73_M2}
GLUCOSE BLD-MCNC: 145 MG/DL (ref 70–99)
HBA1C MFR BLD: 7 % (ref 4–6)
HCT VFR BLD CALC: 48.5 % (ref 40.7–50.3)
HDLC SERPL-MCNC: 42 MG/DL
HEMOGLOBIN: 15.8 G/DL (ref 13–17)
IMMATURE GRANULOCYTES: 1 %
LDL CHOLESTEROL: 108 MG/DL (ref 0–130)
LYMPHOCYTES # BLD: 24 % (ref 24–43)
MCH RBC QN AUTO: 29.5 PG (ref 25.2–33.5)
MCHC RBC AUTO-ENTMCNC: 32.6 G/DL (ref 28.4–34.8)
MCV RBC AUTO: 90.5 FL (ref 82.6–102.9)
MONOCYTES # BLD: 9 % (ref 3–12)
NRBC AUTOMATED: 0 PER 100 WBC
PDW BLD-RTO: 12 % (ref 11.8–14.4)
PLATELET # BLD: 210 K/UL (ref 138–453)
PLATELET ESTIMATE: ABNORMAL
PMV BLD AUTO: 11 FL (ref 8.1–13.5)
POTASSIUM SERPL-SCNC: 4.6 MMOL/L (ref 3.7–5.3)
PROSTATE SPECIFIC ANTIGEN: 1.12 UG/L
RBC # BLD: 5.36 M/UL (ref 4.21–5.77)
RBC # BLD: ABNORMAL 10*6/UL
SEG NEUTROPHILS: 63 % (ref 36–65)
SEGMENTED NEUTROPHILS ABSOLUTE COUNT: 4.37 K/UL (ref 1.5–8.1)
SODIUM BLD-SCNC: 141 MMOL/L (ref 135–144)
TOTAL PROTEIN: 6.8 G/DL (ref 6.4–8.3)
TRIGL SERPL-MCNC: 137 MG/DL
VLDLC SERPL CALC-MCNC: NORMAL MG/DL (ref 1–30)
WBC # BLD: 6.9 K/UL (ref 3.5–11.3)
WBC # BLD: ABNORMAL 10*3/UL

## 2021-08-03 ENCOUNTER — OFFICE VISIT (OUTPATIENT)
Dept: FAMILY MEDICINE CLINIC | Age: 56
End: 2021-08-03
Payer: COMMERCIAL

## 2021-08-03 VITALS
DIASTOLIC BLOOD PRESSURE: 74 MMHG | RESPIRATION RATE: 18 BRPM | WEIGHT: 210 LBS | OXYGEN SATURATION: 98 % | SYSTOLIC BLOOD PRESSURE: 116 MMHG | HEIGHT: 68 IN | BODY MASS INDEX: 31.83 KG/M2 | HEART RATE: 90 BPM

## 2021-08-03 DIAGNOSIS — E78.5 HYPERLIPIDEMIA, UNSPECIFIED HYPERLIPIDEMIA TYPE: ICD-10-CM

## 2021-08-03 DIAGNOSIS — E11.9 DIABETES MELLITUS WITHOUT COMPLICATION (HCC): Primary | ICD-10-CM

## 2021-08-03 PROCEDURE — 99213 OFFICE O/P EST LOW 20 MIN: CPT | Performed by: NURSE PRACTITIONER

## 2021-08-03 PROCEDURE — 3051F HG A1C>EQUAL 7.0%<8.0%: CPT | Performed by: NURSE PRACTITIONER

## 2021-08-03 RX ORDER — METFORMIN HYDROCHLORIDE 500 MG/1
1000 TABLET, EXTENDED RELEASE ORAL
Qty: 60 TABLET | Refills: 5 | Status: SHIPPED | OUTPATIENT
Start: 2021-08-03 | End: 2022-01-21 | Stop reason: SDUPTHER

## 2021-08-03 ASSESSMENT — ENCOUNTER SYMPTOMS
DIARRHEA: 0
COUGH: 0
VOMITING: 0
NAUSEA: 0
SORE THROAT: 0
SINUS PAIN: 0
ABDOMINAL PAIN: 0
EYE PAIN: 0
SHORTNESS OF BREATH: 0
BACK PAIN: 0

## 2021-08-03 ASSESSMENT — PATIENT HEALTH QUESTIONNAIRE - PHQ9
SUM OF ALL RESPONSES TO PHQ QUESTIONS 1-9: 0
SUM OF ALL RESPONSES TO PHQ QUESTIONS 1-9: 0
SUM OF ALL RESPONSES TO PHQ9 QUESTIONS 1 & 2: 0
1. LITTLE INTEREST OR PLEASURE IN DOING THINGS: 0
SUM OF ALL RESPONSES TO PHQ QUESTIONS 1-9: 0
2. FEELING DOWN, DEPRESSED OR HOPELESS: 0

## 2021-08-03 NOTE — PATIENT INSTRUCTIONS
Patient Education        Learning About Type 2 Diabetes  What is type 2 diabetes? Type 2 diabetes is a condition in which you have too much sugar (glucose) in your blood. Glucose is a type of sugar produced in your body when carbohydrates and other foods are digested. It provides energy to cells throughout the body. Normally, blood sugar levels increase after you eat a meal. When blood sugar rises, cells in the pancreas release insulin, which causes the body to absorb sugar from the blood and lowers the blood sugar level to normal.  When you have type 2 diabetes, sugar stays in the blood rather than entering the body's cells to be used for energy. This results in high blood sugar. It happens when your body can't use insulin the right way. Over time, high blood sugar can harm many parts of the body, such as your eyes, heart, blood vessels, nerves, and kidneys. It can also increase your risk for other health problems (complications). What can you expect with type 2 diabetes? Kimberlyn Foley keep hearing about how important it is to keep your blood sugar within a target range. That's because over time, high blood sugar can lead to serious problems. It can:  · Harm your eyes, nerves, and kidneys. · Damage your blood vessels, leading to heart disease and stroke. · Reduce blood flow and cause nerve damage to parts of your body, especially your feet. This can cause slow healing and pain when you walk. · Make your immune system weak and less able to fight infections. When people hear the word \"diabetes,\" they often think of problems like these. But daily care and treatment can help prevent or delay these problems. The goal is to keep your blood sugar in a target range. That's the best way to reduce your chance of having more problems from diabetes. What are the symptoms? Some people who have type 2 diabetes may not have any symptoms early on.  Many people with the disease don't even know they have it at first. But with in to your [employer] account. Enter D756 in the [facility] box to learn more about \"Learning About Type 2 Diabetes. \"     If you do not have an account, please click on the \"Sign Up Now\" link. Current as of: August 31, 2020               Content Version: 12.9  © 9693-5273 [employer], [employer]. Care instructions adapted under license by [address] If you have questions about a medical condition or this instruction, always ask your healthcare professional. [address] any warranty or liability for your use of this information.

## 2021-08-03 NOTE — PROGRESS NOTES
[facility]  [address] [address]  Dept: [phone]  Dept Fax: [phone]    Jayde Kaufman is a 61 y.o. male who presents today for his medicalconditions/complaints as noted below. Jayde Kaufman is c/o of Established New Doctor and Discuss Labs      HPI:         39-year-old male patient presents with complaints of encounter to establish care. Significant history of type 2 diabetes. Reportedly takes Metformin 500 twice daily. Reports that he misses his evening dose frequently and takes only 500 daily most of the time. Reportedly had been following a close diet however has stopped recently. Patient's weight is up 15 pounds from most recent visit. Most recent A1c increased from 6.1-7.0. History of hyperlipidemia, takes Lipitor 20 mg daily, tolerating well, most recent lipid levels within normal range. Past Medical History:   Diagnosis Date    [facility] ([facility]     Hyperlipidemia         Current Outpatient Medications   Medication Sig Dispense Refill    metFORMIN (GLUCOPHAGE XR) 500 MG extended release tablet Take 2 tablets by mouth daily (with breakfast) 60 tablet 5    atorvastatin (LIPITOR) 20 MG tablet TAKE ONE TABLET BY MOUTH DAILY 30 tablet 3    glucose monitoring kit (FREESTYLE) monitoring kit 1 kit by Does not apply route daily 1 kit 0    blood glucose test strips (ACCU-CHEK COMPACT TEST DRUM) strip 1 each by In Vitro route daily As needed. 100 each 3    Lancets MISC 1 each by Does not apply route daily 100 each 5    Respiratory Therapy Supplies KEILY 1 Device by Does not apply route nightly NASAL CPAP per recommendations of sleep medicine physician Dx : severe sleep apnea 1 Device 0     No current facility-administered medications for this visit. No Known Allergies    Subjective:      Review of Systems   Constitutional: Negative for chills and fatigue.    HENT: Negative for congestion, ear pain, sinus pain and sore throat. Eyes: Negative for pain and visual disturbance. Respiratory: Negative for cough and shortness of breath. Cardiovascular: Negative for chest pain and palpitations. Gastrointestinal: Negative for abdominal pain, diarrhea, nausea and vomiting. Genitourinary: Negative for penile pain and testicular pain. Musculoskeletal: Negative for back pain, joint swelling and neck pain. Skin: Negative for rash. Neurological: Negative for dizziness and light-headedness. Hematological: Does not bruise/bleed easily. All other systems reviewed and are negative.      :Objective     Physical Exam  Vitals and nursing note reviewed. Constitutional:       General: He is not in acute distress. Appearance: Normal appearance. He is not toxic-appearing. HENT:      Mouth/Throat:      Mouth: Mucous membranes are moist.   Cardiovascular:      Rate and Rhythm: Normal rate. Pulmonary:      Effort: Pulmonary effort is normal. No respiratory distress. Breath sounds: Normal breath sounds. Skin:     General: Skin is warm and dry. Neurological:      General: No focal deficit present. Mental Status: He is alert and oriented to person, place, and time.        /74   Pulse 90   Resp 18   Ht 5' 8\" (1.727 m)   Wt 210 lb (95.3 kg)   SpO2 98%   BMI 31.93 kg/m²     Lab Review   Hospital Outpatient Visit on 07/30/2021   Component Date Value    PSA 07/30/2021 1.12     Hemoglobin A1C 07/30/2021 7.0*    Estimated Avg Glucose 07/30/2021 154     Cholesterol 07/30/2021 177     HDL 07/30/2021 42     LDL Cholesterol 07/30/2021 108     Chol/HDL Ratio 07/30/2021 4.2     Triglycerides 07/30/2021 137     VLDL 07/30/2021 NOT REPORTED     Glucose 07/30/2021 145*    BUN 07/30/2021 13     CREATININE 07/30/2021 0.88     Bun/Cre Ratio 07/30/2021 NOT REPORTED     Calcium 07/30/2021 9.4     Sodium 07/30/2021 141     Potassium 07/30/2021 4.6     Chloride 07/30/2021 104     CO2 07/30/2021 23     Anion Gap 07/30/2021 14     Alkaline Phosphatase 07/30/2021 82     ALT 07/30/2021 32     AST 07/30/2021 18     Total Bilirubin 07/30/2021 0.61     Total Protein 07/30/2021 6.8     Albumin 07/30/2021 4.3     Albumin/Globulin Ratio 07/30/2021 1.7     GFR Non- 07/30/2021 >60     GFR  07/30/2021 >60     GFR Comment 07/30/2021          GFR Staging 07/30/2021 NOT REPORTED     WBC 07/30/2021 6.9     RBC 07/30/2021 5.36     Hemoglobin 07/30/2021 15.8     Hematocrit 07/30/2021 48.5     MCV 07/30/2021 90.5     MCH 07/30/2021 29.5     MCHC 07/30/2021 32.6     RDW 07/30/2021 12.0     Platelets 57/37/7172 210     MPV 07/30/2021 11.0     NRBC Automated 07/30/2021 0.0     Differential Type 07/30/2021 NOT REPORTED     Seg Neutrophils 07/30/2021 63     Lymphocytes 07/30/2021 24     Monocytes 07/30/2021 9     Eosinophils % 07/30/2021 2     Basophils 07/30/2021 1     Immature Granulocytes 07/30/2021 1*    Segs Absolute 07/30/2021 4.37     Absolute Lymph # 07/30/2021 1.62     Absolute Mono # 07/30/2021 0.61     Absolute Eos # 07/30/2021 0.16     Basophils Absolute 07/30/2021 0.07     Absolute Immature Granul* 07/30/2021 0.04     WBC Morphology 07/30/2021 NOT REPORTED     RBC Morphology 07/30/2021 NOT REPORTED     Platelet Estimate 46/85/3606 NOT REPORTED        Assessment and Plan      1. Diabetes mellitus without complication (HCC)  -     metFORMIN (GLUCOPHAGE XR) 500 MG extended release tablet; Take 2 tablets by mouth daily (with breakfast), Disp-60 tablet, R-5Normal  -     Hemoglobin A1C; Future  2. Hyperlipidemia, unspecified hyperlipidemia type      We'll adjust Metformin dosing and instead of taking 500 twice daily, will adjust to 1000 XR once daily  Recheck A1c and 6 months continue lifestyle changes                  No results found for this visit on 08/03/21.           Return in about 6 months (around 2/3/2022), or if symptoms worsen or

## 2021-08-03 NOTE — PROGRESS NOTES
Visit Information    Have you changed or started any medications since your last visit including any over-the-counter medicines, vitamins, or herbal medicines? no   Are you having any side effects from any of your medications? -  no  Have you stopped taking any of your medications? Is so, why? -  no    Have you seen any other physician or provider since your last visit? No  Have you had any other diagnostic tests since your last visit? No  Have you been seen in the emergency room and/or had an admission to a hospital since we last saw you? No  Have you had your routine dental cleaning in the past 6 months? no    Have you activated your [employer] account? If not, what are your barriers?  Yes     Patient Care Team:  CLARY Clark - CNP as PCP - General (Certified Nurse Practitioner)  Charito Carroll DO as PCP - [address] [place] [address] Dr Cabrera Provider    Medical History Review  Past Medical, Family, and Social History reviewed and does contribute to the patient presenting condition    Health Maintenance   Topic Date Due    Hepatitis C screen  Never done    Pneumococcal 0-64 years Vaccine (1 of 2 - PPSV23) Never done    COVID-19 Vaccine (1) Never done    HIV screen  Never done    Diabetic microalbuminuria test  Never done    Hepatitis B vaccine (1 of 3 - Risk 3-dose series) Never done    Diabetic foot exam  11/22/2020    Flu vaccine (1) 09/01/2021    Diabetic retinal exam  05/20/2022    A1C test (Diabetic or Prediabetic)  07/30/2022    Lipid screen  07/30/2022    DTaP/Tdap/Td vaccine (2 - Td or Tdap) 10/07/2024    Colon cancer screen colonoscopy  12/05/2024    Shingles Vaccine  Completed    Hepatitis A vaccine  Aged Out    Hib vaccine  Aged Out    Meningococcal (ACWY) vaccine  Aged Out

## 2021-09-10 RX ORDER — ATORVASTATIN CALCIUM 20 MG/1
TABLET, FILM COATED ORAL
Qty: 30 TABLET | Refills: 3 | Status: SHIPPED | OUTPATIENT
Start: 2021-09-10 | End: 2022-01-21 | Stop reason: SDUPTHER

## 2022-01-21 RX ORDER — ATORVASTATIN CALCIUM 20 MG/1
TABLET, FILM COATED ORAL
Qty: 30 TABLET | Refills: 3 | Status: SHIPPED | OUTPATIENT
Start: 2022-01-21 | End: 2022-06-07 | Stop reason: SDUPTHER

## 2022-02-21 ENCOUNTER — OFFICE VISIT (OUTPATIENT)
Dept: FAMILY MEDICINE CLINIC | Age: 57
End: 2022-02-21
Payer: COMMERCIAL

## 2022-02-21 VITALS
HEART RATE: 79 BPM | SYSTOLIC BLOOD PRESSURE: 120 MMHG | OXYGEN SATURATION: 98 % | DIASTOLIC BLOOD PRESSURE: 76 MMHG | WEIGHT: 212 LBS | TEMPERATURE: 97.6 F | HEIGHT: 68 IN | BODY MASS INDEX: 32.13 KG/M2

## 2022-02-21 DIAGNOSIS — Z13.29 THYROID DISORDER SCREENING: ICD-10-CM

## 2022-02-21 DIAGNOSIS — Z12.5 PROSTATE CANCER SCREENING: ICD-10-CM

## 2022-02-21 DIAGNOSIS — E11.9 DIABETES MELLITUS WITHOUT COMPLICATION (HCC): Primary | ICD-10-CM

## 2022-02-21 DIAGNOSIS — E78.5 HYPERLIPIDEMIA, UNSPECIFIED HYPERLIPIDEMIA TYPE: ICD-10-CM

## 2022-02-21 LAB — HBA1C MFR BLD: 7.9 %

## 2022-02-21 PROCEDURE — 3051F HG A1C>EQUAL 7.0%<8.0%: CPT | Performed by: NURSE PRACTITIONER

## 2022-02-21 PROCEDURE — 99213 OFFICE O/P EST LOW 20 MIN: CPT | Performed by: NURSE PRACTITIONER

## 2022-02-21 PROCEDURE — 83036 HEMOGLOBIN GLYCOSYLATED A1C: CPT | Performed by: NURSE PRACTITIONER

## 2022-02-21 ASSESSMENT — ENCOUNTER SYMPTOMS
SINUS PAIN: 0
NAUSEA: 0
BACK PAIN: 0
ABDOMINAL PAIN: 0
DIARRHEA: 0
SHORTNESS OF BREATH: 0
COUGH: 0
SORE THROAT: 0
EYE PAIN: 0
VOMITING: 0

## 2022-02-21 NOTE — PROGRESS NOTES
[facility]  [address] [address]  Dept: [phone]  Dept Fax: [phone]    Cielo Tan is a 61 y.o. male who presents today for his medicalconditions/complaints as noted below. Cielo Tan is c/o of Diabetes      HPI:       65-year-old male patient presents with complaints of diabetic follow-up. History of type 2 diabetes. Currently taking Metformin 1000 once daily. Last A1c 7.0. Reports that he has not been very compliant with diet or exercise. Has been going through stress from divorce. Reports he believes he has gained weight, up from 210 at last visit 212 today. Hyperlipidemia, currently managed with atorvastatin 20, last lipid level stable      Past Medical History:   Diagnosis Date    DM [facility]di[facility] mellitus) ([facility]     Hyperlipidemia         Current Outpatient Medications   Medication Sig Dispense Refill    metFORMIN (GLUCOPHAGE) 1000 MG tablet Take 1 tablet by mouth 2 times daily (with meals) 180 tablet 1    atorvastatin (LIPITOR) 20 MG tablet TAKE ONE TABLET BY MOUTH DAILY 30 tablet 3    glucose monitoring kit (FREESTYLE) monitoring kit 1 kit by Does not apply route daily 1 kit 0    blood glucose test strips (ACCU-CHEK COMPACT TEST DRUM) strip 1 each by In Vitro route daily As needed. 100 each 3    Lancets MISC 1 each by Does not apply route daily 100 each 5    Respiratory Therapy Supplies KEILY 1 Device by Does not apply route nightly NASAL CPAP per recommendations of sleep medicine physician Dx : severe sleep apnea 1 Device 0     No current facility-administered medications for this visit. No Known Allergies    Subjective:      Review of Systems   Constitutional: Negative for chills and fatigue. HENT: Negative for congestion, ear pain, sinus pain and sore throat. Eyes: Negative for pain and visual disturbance. Respiratory: Negative for cough and shortness of breath.     Cardiovascular: Negative for chest pain and palpitations. Gastrointestinal: Negative for abdominal pain, diarrhea, nausea and vomiting. Genitourinary: Negative for penile pain and testicular pain. Musculoskeletal: Negative for back pain, joint swelling and neck pain. Skin: Negative for rash. Neurological: Negative for dizziness and light-headedness. Hematological: Does not bruise/bleed easily. All other systems reviewed and are negative.      :Objective     Physical Exam  Vitals and nursing note reviewed. Constitutional:       General: He is not in acute distress. Appearance: Normal appearance. He is not toxic-appearing. Cardiovascular:      Rate and Rhythm: Normal rate. Pulmonary:      Effort: Pulmonary effort is normal.      Breath sounds: Normal breath sounds. Skin:     General: Skin is warm and dry. Neurological:      General: No focal deficit present. Mental Status: He is alert and oriented to person, place, and time. /76 (Site: Left Upper Arm, Position: Sitting, Cuff Size: Large Adult)   Pulse 79   Temp 97.6 °F (36.4 °C) (Tympanic)   Ht 5' 8\" (1.727 m)   Wt 212 lb (96.2 kg)   SpO2 98%   BMI 32.23 kg/m²     Lab Review   No visits with results within 6 Month(s) from this visit.    Latest known visit with results is:   Hospital Outpatient Visit on 07/30/2021   Component Date Value    PSA 07/30/2021 1.12     Hemoglobin A1C 07/30/2021 7.0*    Estimated Avg Glucose 07/30/2021 154     Cholesterol 07/30/2021 177     HDL 07/30/2021 42     LDL Cholesterol 07/30/2021 108     Chol/HDL Ratio 07/30/2021 4.2     Triglycerides 07/30/2021 137     VLDL 07/30/2021 NOT REPORTED     Glucose 07/30/2021 145*    BUN 07/30/2021 13     CREATININE 07/30/2021 0.88     Bun/Cre Ratio 07/30/2021 NOT REPORTED     Calcium 07/30/2021 9.4     Sodium 07/30/2021 141     Potassium 07/30/2021 4.6     Chloride 07/30/2021 104     CO2 07/30/2021 23     Anion Gap 07/30/2021 14     Alkaline Phosphatase 07/30/2021 82     ALT 07/30/2021 32     AST 07/30/2021 18     Total Bilirubin 07/30/2021 0.61     Total Protein 07/30/2021 6.8     Albumin 07/30/2021 4.3     Albumin/Globulin Ratio 07/30/2021 1.7     GFR Non- 07/30/2021 >60     GFR  07/30/2021 >60     GFR Comment 07/30/2021          GFR Staging 07/30/2021 NOT REPORTED     WBC 07/30/2021 6.9     RBC 07/30/2021 5.36     Hemoglobin 07/30/2021 15.8     Hematocrit 07/30/2021 48.5     MCV 07/30/2021 90.5     MCH 07/30/2021 29.5     MCHC 07/30/2021 32.6     RDW 07/30/2021 12.0     Platelets 49/05/8912 210     MPV 07/30/2021 11.0     NRBC Automated 07/30/2021 0.0     Differential Type 07/30/2021 NOT REPORTED     Seg Neutrophils 07/30/2021 63     Lymphocytes 07/30/2021 24     Monocytes 07/30/2021 9     Eosinophils % 07/30/2021 2     Basophils 07/30/2021 1     Immature Granulocytes 07/30/2021 1*    Segs Absolute 07/30/2021 4.37     Absolute Lymph # 07/30/2021 1.62     Absolute Mono # 07/30/2021 0.61     Absolute Eos # 07/30/2021 0.16     Basophils Absolute 07/30/2021 0.07     Absolute Immature Granul* 07/30/2021 0.04     WBC Morphology 07/30/2021 NOT REPORTED     RBC Morphology 07/30/2021 NOT REPORTED     Platelet Estimate 99/61/5942 NOT REPORTED        Assessment and Plan      1. Diabetes mellitus without complication (HCC)  -     POCT glycosylated hemoglobin (Hb A1C)  -     metFORMIN (GLUCOPHAGE) 1000 MG tablet; Take 1 tablet by mouth 2 times daily (with meals), Disp-180 tablet, R-1Normal  -     CBC with Auto Differential; Future  -     Comprehensive Metabolic Panel; Future  -     Hemoglobin A1C; Future  2. Hyperlipidemia, unspecified hyperlipidemia type  -     CBC with Auto Differential; Future  -     Lipid Panel; Future  3. Prostate cancer screening  -     CBC with Auto Differential; Future  -     PSA Screening; Future  4.  Thyroid disorder screening  -     TSH With Reflex Ft4; Future Order for labs including CBC, CMP, lipids, TSH, PSA, A1c  Complete in 3 months  A1c increased to 7.9, metformin adjusted to 1000 twice daily  Recheck in 3 months sooner prn          Results for orders placed or performed in visit on 02/21/22   POCT glycosylated hemoglobin (Hb A1C)   Result Value Ref Range    Hemoglobin A1C 7.9 %             Return if symptoms worsen or fail to improve, for dm check. Orders Placed This Encounter   Medications    metFORMIN (GLUCOPHAGE) 1000 MG tablet     Sig: Take 1 tablet by mouth 2 times daily (with meals)     Dispense:  180 tablet     Refill:  1        Patient given educational materials - see patient instructions. Discussed use, benefit, and side effects of prescribed medications. All patientquestions answered. Pt voiced understanding. Patient given educational materials - see patient instructions. Discussed use, benefit, and side effects of prescribed medications. All patientquestions answered. Pt voiced understanding. This note was transcribed using dictation with Dragon services. Efforts were made to correct any errors but some words may be misinterpreted.     Patient assumes risks associated with failure to complete recommended testing and treatments in a timely manner    Electronically signed by CLARY Munoz CNP on 2/21/2022at 9:29 AM

## 2022-02-21 NOTE — PATIENT INSTRUCTIONS
Patient Education        Learning About Type 2 Diabetes  What is type 2 diabetes? Type 2 diabetes is a condition in which you have too much sugar (glucose) in your blood. Glucose is a type of sugar produced in your body when carbohydrates and other foods are digested. It provides energy to cells throughout the body. Normally, blood sugar levels increase after you eat a meal. When blood sugar rises, cells in the pancreas release insulin, which causes the body to absorb sugar from the blood and lowers the blood sugar level to normal.  When you have type 2 diabetes, sugar stays in the blood rather than entering the body's cells to be used for energy. This results in high blood sugar. It happens when your body can't use insulin the right way. Over time, high blood sugar can harm many parts of the body, such as your eyes, heart, blood vessels, nerves, and kidneys. It can also increase your risk for other health problems (complications). What can you expect with type 2 diabetes? Trae Batters keep hearing about how important it is to keep your blood sugar within a target range. That's because over time, high blood sugar can lead to serious problems. It can:  · Harm your eyes, nerves, and kidneys. · Damage your blood vessels, leading to heart disease and stroke. · Reduce blood flow and cause nerve damage to parts of your body, especially your feet. This can cause slow healing and pain when you walk. · Make your immune system weak and less able to fight infections. When people hear the word \"diabetes,\" they often think of problems like these. But daily care and treatment can help prevent or delay these problems. The goal is to keep your blood sugar in a target range. That's the best way to reduce your chance of having more problems from diabetes. What are the symptoms? Some people who have type 2 diabetes may not have any symptoms early on.  Many people with the disease don't even know they have it at first. But with time, diabetes starts to cause symptoms. You have most symptoms of type 2 diabetes when your blood sugar is either too high or too low. The most common symptoms of high blood sugar include:  · Thirst.  · Needing to urinate often. · Weight loss. · Blurry vision. The symptoms of low blood sugar include:  · Sweating. · Shakiness. · Weakness. · Hunger. · Confusion. You're not likely to get symptoms of low blood sugar unless you take insulin or use certain diabetes medicines that lower blood sugar. How can you help prevent type 2 diabetes? There are things you can do to help prevent type 2 diabetes. Stay at a healthy weight. Exercise regularly, and eat healthy foods. Even small changes can make a difference. If you have prediabetes, the medicine metformin can help prevent type 2 diabetes. How is type 2 diabetes treated? Treatment for type 2 diabetes will change over time to meet your needs. But the focus of your treatment will usually be to keep your blood sugar levels in your target range. This will help prevent problems such as eye, kidney, heart, blood vessel, and nerve disease. Some people may need medicines to help their bodies make insulin or decrease insulin resistance. Some medicines slow down how quickly the body absorbs carbohydrates. Treatment to manage type 2 diabetes includes:  · Making healthy food choices and being active. · Losing weight, if you need to. · Seeing your doctor regularly. · Keeping your blood sugar in your target range. · Taking medicines, if you need them. · Quitting smoking, if you smoke. · Keeping your blood pressure and cholesterol under control. Follow-up care is a key part of your treatment and safety. Be sure to make and go to all appointments, and call your doctor if you are having problems. It's also a good idea to know your test results and keep a list of the medicines you take. Where can you learn more? Go to [email or web address] and sign in to your [employer] account. Enter M543 in the [facility] box to learn more about \"Learning About Type 2 Diabetes. \"     If you do not have an account, please click on the \"Sign Up Now\" link. Current as of: July 28, 2021               Content Version: 13.1  © 0391-2904 [employer]wise, Incorporated. Care instructions adapted under license by [facility] ([facility]). If you have questions about a medical condition or this instruction, always ask your healthcare professional. [address] any warranty or liability for your use of this information.

## 2022-02-21 NOTE — PROGRESS NOTES
Visit Information    Have you changed or started any medications since your last visit including any over-the-counter medicines, vitamins, or herbal medicines? no   Are you having any side effects from any of your medications? -  no  Have you stopped taking any of your medications? Is so, why? -  no    Have you seen any other physician or provider since your last visit? No  Have you had any other diagnostic tests since your last visit? No  Have you been seen in the emergency room and/or had an admission to a hospital since we last saw you? No  Have you had your routine dental cleaning in the past 6 months? no    Have you activated your [employer] account? If not, what are your barriers?  Yes     Patient Care Team:  CLARY Draper CNP as PCP - General (Certified Nurse Practitioner)  CLARY Draper CNP as PCP - [facility] Provider    Medical History Review  Past Medical, Family, and Social History reviewed and does contribute to the patient presenting condition    Health Maintenance   Topic Date Due    Hepatitis C screen  Never done    COVID-19 Vaccine (1) Never done    Pneumococcal 0-64 years Vaccine (1 of 2 - PPSV23) Never done    HIV screen  Never done    Diabetic microalbuminuria test  Never done    Hepatitis B vaccine (1 of 3 - Risk 3-dose series) Never done    Diabetic foot exam  11/22/2020    Flu vaccine (1) 09/01/2021    Diabetic retinal exam  05/20/2022    A1C test (Diabetic or Prediabetic)  07/30/2022    Lipid screen  07/30/2022    Depression Screen  08/03/2022    DTaP/Tdap/Td vaccine (2 - Td or Tdap) 10/07/2024    Colorectal Cancer Screen  12/05/2024    Shingles Vaccine  Completed    Hepatitis A vaccine  Aged Out    Hib vaccine  Aged Out    Meningococcal (ACWY) vaccine  Aged Out

## 2022-06-01 ENCOUNTER — HOSPITAL ENCOUNTER (OUTPATIENT)
Age: 57
Setting detail: SPECIMEN
Discharge: HOME | End: 2022-06-01

## 2022-06-01 DIAGNOSIS — E11.9 DIABETES MELLITUS WITHOUT COMPLICATION (HCC): ICD-10-CM

## 2022-06-01 DIAGNOSIS — Z12.5 PROSTATE CANCER SCREENING: ICD-10-CM

## 2022-06-01 DIAGNOSIS — Z13.29 THYROID DISORDER SCREENING: ICD-10-CM

## 2022-06-01 DIAGNOSIS — E78.5 HYPERLIPIDEMIA, UNSPECIFIED HYPERLIPIDEMIA TYPE: ICD-10-CM

## 2022-06-01 LAB
ABSOLUTE EOS #: 0.12 K/UL (ref 0–0.44)
ABSOLUTE IMMATURE GRANULOCYTE: 0.04 K/UL (ref 0–0.3)
ABSOLUTE LYMPH #: 1.63 K/UL (ref 1.1–3.7)
ABSOLUTE MONO #: 0.65 K/UL (ref 0.1–1.2)
ALBUMIN SERPL-MCNC: 4.7 G/DL (ref 3.5–5.2)
ALBUMIN/GLOBULIN RATIO: 2.2 (ref 1–2.5)
ALP BLD-CCNC: 77 U/L (ref 40–129)
ALT SERPL-CCNC: 26 U/L (ref 5–41)
ANION GAP SERPL CALCULATED.3IONS-SCNC: 11 MMOL/L (ref 9–17)
AST SERPL-CCNC: 17 U/L
BASOPHILS # BLD: 1 % (ref 0–2)
BASOPHILS ABSOLUTE: 0.06 K/UL (ref 0–0.2)
BILIRUB SERPL-MCNC: 0.63 MG/DL (ref 0.3–1.2)
BUN BLDV-MCNC: 17 MG/DL (ref 6–20)
CALCIUM SERPL-MCNC: 9.4 MG/DL (ref 8.6–10.4)
CHLORIDE BLD-SCNC: 101 MMOL/L (ref 98–107)
CHOLESTEROL/HDL RATIO: 4.4
CHOLESTEROL: 177 MG/DL
CO2: 25 MMOL/L (ref 20–31)
CREAT SERPL-MCNC: 0.8 MG/DL (ref 0.7–1.2)
EOSINOPHILS RELATIVE PERCENT: 2 % (ref 1–4)
ESTIMATED AVERAGE GLUCOSE: 169 MG/DL
GFR AFRICAN AMERICAN: >60 ML/MIN
GFR NON-AFRICAN AMERICAN: >60 ML/MIN
GFR SERPL CREATININE-BSD FRML MDRD: ABNORMAL ML/MIN/{1.73_M2}
GLUCOSE BLD-MCNC: 170 MG/DL (ref 70–99)
HBA1C MFR BLD: 7.5 % (ref 4–6)
HCT VFR BLD CALC: 45.5 % (ref 40.7–50.3)
HDLC SERPL-MCNC: 40 MG/DL
HEMOGLOBIN: 15.5 G/DL (ref 13–17)
IMMATURE GRANULOCYTES: 1 %
LDL CHOLESTEROL: 109 MG/DL (ref 0–130)
LYMPHOCYTES # BLD: 27 % (ref 24–43)
MCH RBC QN AUTO: 30.1 PG (ref 25.2–33.5)
MCHC RBC AUTO-ENTMCNC: 34.1 G/DL (ref 28.4–34.8)
MCV RBC AUTO: 88.3 FL (ref 82.6–102.9)
MONOCYTES # BLD: 11 % (ref 3–12)
NRBC AUTOMATED: 0 PER 100 WBC
PDW BLD-RTO: 12.2 % (ref 11.8–14.4)
PLATELET # BLD: 218 K/UL (ref 138–453)
PMV BLD AUTO: 11.3 FL (ref 8.1–13.5)
POTASSIUM SERPL-SCNC: 4.1 MMOL/L (ref 3.7–5.3)
PROSTATE SPECIFIC ANTIGEN: 1.12 NG/ML
RBC # BLD: 5.15 M/UL (ref 4.21–5.77)
SEG NEUTROPHILS: 58 % (ref 36–65)
SEGMENTED NEUTROPHILS ABSOLUTE COUNT: 3.63 K/UL (ref 1.5–8.1)
SODIUM BLD-SCNC: 137 MMOL/L (ref 135–144)
TOTAL PROTEIN: 6.8 G/DL (ref 6.4–8.3)
TRIGL SERPL-MCNC: 138 MG/DL
TSH SERPL DL<=0.05 MIU/L-ACNC: 2.81 UIU/ML (ref 0.3–5)
WBC # BLD: 6.1 K/UL (ref 3.5–11.3)

## 2022-06-07 ENCOUNTER — OFFICE VISIT (OUTPATIENT)
Dept: FAMILY MEDICINE CLINIC | Age: 57
End: 2022-06-07
Payer: COMMERCIAL

## 2022-06-07 VITALS
HEIGHT: 68 IN | BODY MASS INDEX: 32.28 KG/M2 | HEART RATE: 97 BPM | WEIGHT: 213 LBS | TEMPERATURE: 96.8 F | SYSTOLIC BLOOD PRESSURE: 124 MMHG | DIASTOLIC BLOOD PRESSURE: 70 MMHG | OXYGEN SATURATION: 98 %

## 2022-06-07 DIAGNOSIS — E11.9 DIABETES MELLITUS WITHOUT COMPLICATION (HCC): Primary | ICD-10-CM

## 2022-06-07 DIAGNOSIS — E78.5 HYPERLIPIDEMIA, UNSPECIFIED HYPERLIPIDEMIA TYPE: ICD-10-CM

## 2022-06-07 PROCEDURE — 99213 OFFICE O/P EST LOW 20 MIN: CPT | Performed by: NURSE PRACTITIONER

## 2022-06-07 PROCEDURE — 3051F HG A1C>EQUAL 7.0%<8.0%: CPT | Performed by: NURSE PRACTITIONER

## 2022-06-07 RX ORDER — METFORMIN HYDROCHLORIDE 500 MG/1
2000 TABLET, EXTENDED RELEASE ORAL
Qty: 360 TABLET | Refills: 1 | Status: SHIPPED | OUTPATIENT
Start: 2022-06-07 | End: 2022-07-22 | Stop reason: SDUPTHER

## 2022-06-07 RX ORDER — ATORVASTATIN CALCIUM 20 MG/1
TABLET, FILM COATED ORAL
Qty: 90 TABLET | Refills: 1 | Status: ON HOLD
Start: 2022-06-07 | End: 2022-06-18 | Stop reason: HOSPADM

## 2022-06-07 SDOH — ECONOMIC STABILITY: FOOD INSECURITY: WITHIN THE PAST 12 MONTHS, YOU WORRIED THAT YOUR FOOD WOULD RUN OUT BEFORE YOU GOT MONEY TO BUY MORE.: NEVER TRUE

## 2022-06-07 SDOH — ECONOMIC STABILITY: FOOD INSECURITY: WITHIN THE PAST 12 MONTHS, THE FOOD YOU BOUGHT JUST DIDN'T LAST AND YOU DIDN'T HAVE MONEY TO GET MORE.: NEVER TRUE

## 2022-06-07 ASSESSMENT — ENCOUNTER SYMPTOMS
NAUSEA: 0
DIARRHEA: 0
BACK PAIN: 0
SHORTNESS OF BREATH: 0
ABDOMINAL PAIN: 0
SORE THROAT: 0
COUGH: 0
VOMITING: 0
EYE PAIN: 0
SINUS PAIN: 0

## 2022-06-07 ASSESSMENT — PATIENT HEALTH QUESTIONNAIRE - PHQ9
SUM OF ALL RESPONSES TO PHQ QUESTIONS 1-9: 0
1. LITTLE INTEREST OR PLEASURE IN DOING THINGS: 0
2. FEELING DOWN, DEPRESSED OR HOPELESS: 0
SUM OF ALL RESPONSES TO PHQ9 QUESTIONS 1 & 2: 0
SUM OF ALL RESPONSES TO PHQ QUESTIONS 1-9: 0

## 2022-06-07 ASSESSMENT — SOCIAL DETERMINANTS OF HEALTH (SDOH): HOW HARD IS IT FOR YOU TO PAY FOR THE VERY BASICS LIKE FOOD, HOUSING, MEDICAL CARE, AND HEATING?: NOT HARD AT ALL

## 2022-06-07 NOTE — PROGRESS NOTES
[address]  [facility]  [address] [place] [address]  Dept: [phone]  Dept Fax: [phone]    Rhina Crook is a 61 y.o. male who presents today for his medicalconditions/complaints as noted below. Rhina Crook is c/o of Diabetes      HPI:     58-year-old male patient presents with complaints of diabetic follow-up.     History of type 2 diabetes. Currently taking Metformin 1000 twice daily. Misses second dose, tolerates ok. Last A1c 7.9, decreased to 7.5. Reports that he has not been very compliant with diet or exercise. Weight unchanged from previous.      Hyperlipidemia, currently managed with atorvastatin 20, last lipid level stable      Past Medical History:   Diagnosis Date    DM (diabetes mellitus) ([facility] [address]     Hyperlipidemia         Current Outpatient Medications   Medication Sig Dispense Refill    metFORMIN (GLUCOPHAGE-XR) 500 mg extended release tablet Take 4 tablets by mouth daily (with breakfast) 360 tablet 1    atorvastatin (LIPITOR) 20 MG tablet TAKE ONE TABLET BY MOUTH DAILY 90 tablet 1    glucose monitoring kit (FREESTYLE) monitoring kit 1 kit by Does not apply route daily 1 kit 0    blood glucose test strips (ACCU-CHEK COMPACT TEST DRUM) strip 1 each by In Vitro route daily As needed. 100 each 3    Lancets MISC 1 each by Does not apply route daily 100 each 5    Respiratory Therapy Supplies KEILY 1 Device by Does not apply route nightly NASAL CPAP per recommendations of sleep medicine physician Dx : severe sleep apnea 1 Device 0     No current facility-administered medications for this visit. No Known Allergies    Subjective:      Review of Systems   Constitutional: Negative for chills and fatigue. HENT: Negative for congestion, ear pain, sinus pain and sore throat. Eyes: Negative for pain and visual disturbance. Respiratory: Negative for cough and shortness of breath.     Cardiovascular: Negative for chest pain and palpitations. Gastrointestinal: Negative for abdominal pain, diarrhea, nausea and vomiting. Genitourinary: Negative for penile pain and testicular pain. Musculoskeletal: Negative for back pain, joint swelling and neck pain. Skin: Negative for rash. Neurological: Negative for dizziness and light-headedness. Hematological: Does not bruise/bleed easily. All other systems reviewed and are negative.      :Objective     Physical Exam  Vitals and nursing note reviewed. Constitutional:       General: He is not in acute distress. Appearance: Normal appearance. He is not toxic-appearing. Cardiovascular:      Rate and Rhythm: Normal rate. Pulmonary:      Effort: Pulmonary effort is normal.      Breath sounds: Normal breath sounds. Skin:     General: Skin is warm and dry. Neurological:      General: No focal deficit present. Mental Status: He is alert and oriented to person, place, and time.        /70 (Site: Left Upper Arm, Position: Sitting, Cuff Size: Large Adult)   Pulse 97   Temp 96.8 °F (36 °C) (Tympanic)   Ht 5' 8\" (1.727 m)   Wt 213 lb (96.6 kg)   SpO2 98%   BMI 32.39 kg/m²     Lab Review   Hospital Outpatient Visit on 06/01/2022   Component Date Value    Hemoglobin A1C 06/01/2022 7.5*    Estimated Avg Glucose 06/01/2022 169     PSA 06/01/2022 1.12     Cholesterol 06/01/2022 177     HDL 06/01/2022 40*    LDL Cholesterol 06/01/2022 109     Chol/HDL Ratio 06/01/2022 4.4     Triglycerides 06/01/2022 138     TSH 06/01/2022 2.81     Glucose 06/01/2022 170*    BUN 06/01/2022 17     CREATININE 06/01/2022 0.80     Calcium 06/01/2022 9.4     Sodium 06/01/2022 137     Potassium 06/01/2022 4.1     Chloride 06/01/2022 101     CO2 06/01/2022 25     Anion Gap 06/01/2022 11     Alkaline Phosphatase 06/01/2022 77     ALT 06/01/2022 26     AST 06/01/2022 17     Total Bilirubin 06/01/2022 0.63     Total Protein 06/01/2022 6.8     Albumin 06/01/2022 4.7     Albumin/Globulin Ratio 06/01/2022 2.2     GFR Non- 06/01/2022 >60     GFR  06/01/2022 >60     GFR Comment 06/01/2022          WBC 06/01/2022 6.1     RBC 06/01/2022 5.15     Hemoglobin 06/01/2022 15.5     Hematocrit 06/01/2022 45.5     MCV 06/01/2022 88.3     MCH 06/01/2022 30.1     MCHC 06/01/2022 34.1     RDW 06/01/2022 12.2     Platelets 98/54/6448 218     MPV 06/01/2022 11.3     NRBC Automated 06/01/2022 0.0     Seg Neutrophils 06/01/2022 58     Lymphocytes 06/01/2022 27     Monocytes 06/01/2022 11     Eosinophils % 06/01/2022 2     Basophils 06/01/2022 1     Immature Granulocytes 06/01/2022 1*    Segs Absolute 06/01/2022 3.63     Absolute Lymph # 06/01/2022 1.63     Absolute Mono # 06/01/2022 0.65     Absolute Eos # 06/01/2022 0.12     Basophils Absolute 06/01/2022 0.06     Absolute Immature Granul* 06/01/2022 0.04    Office Visit on 02/21/2022   Component Date Value    Hemoglobin A1C 02/21/2022 7.9        Assessment and Plan      1. Diabetes mellitus without complication ([facility] [address]  -     metFORMIN (GLUCOPHAGE-XR) 500 mg extended release tablet; Take 4 tablets by mouth daily (with breakfast), Disp-360 tablet, R-1Normal  2. Hyperlipidemia, unspecified hyperlipidemia type  -     atorvastatin (LIPITOR) 20 MG tablet; TAKE ONE TABLET BY MOUTH DAILY, Disp-90 tablet, R-1Normal     Labs reviewed  Adjust to 2000 xr once daily to improve compliance  Refill atorvastatin  Dm check 3 months, sooner prn            No results found for this visit on 06/07/22. Return in about 3 months (around 9/7/2022), or if symptoms worsen or fail to improve.         Orders Placed This Encounter   Medications    metFORMIN (GLUCOPHAGE-XR) 500 mg extended release tablet     Sig: Take 4 tablets by mouth daily (with breakfast)     Dispense:  360 tablet     Refill:  1    atorvastatin (LIPITOR) 20 MG tablet     Sig: TAKE ONE TABLET BY MOUTH DAILY     Dispense:  90 tablet     Refill: 1        Patient given educational materials - see patient instructions. Discussed use, benefit, and side effects of prescribed medications. All patientquestions answered. Pt voiced understanding. Patient given educational materials - see patient instructions. Discussed use, benefit, and side effects of prescribed medications. All patientquestions answered. Pt voiced understanding. This note was transcribed using dictation with Dragon services. Efforts were made to correct any errors but some words may be misinterpreted.     Patient assumes risks associated with failure to complete recommended testing and treatments in a timely manner    Electronically signed by CLARY Ram CNP on 6/7/2022at 8:18 AM

## 2022-06-07 NOTE — PATIENT INSTRUCTIONS
Patient Education        Learning About Type 2 Diabetes  What is type 2 diabetes? Type 2 diabetes is a condition in which you have too much sugar (glucose) in your blood. Glucose is a type of sugar produced in your body when carbohydratesand other foods are digested. It provides energy to cells throughout the body. Normally, blood sugar levels increase after you eat a meal. When blood sugar rises, cells in the pancreas release insulin, which causes the body to absorbsugar from the blood and lowers the blood sugar level to normal.  When you have type 2 diabetes, sugar stays in the blood rather than entering the body's cells to be used for energy. This results in high blood sugar. Ithappens when your body can't use insulin the right way. Over time, high blood sugar can harm many parts of the body, such as your eyes, heart, blood vessels, nerves, and kidneys. It can also increase your risk forother health problems (complications). What can you expect with type 2 diabetes? Kira Barone keep hearing about how important it is to keep your blood sugar within a target range. That's because over time, high blood sugar can lead to seriousproblems. It can:   Harm your eyes, nerves, and kidneys.  Damage your blood vessels, leading to heart disease and stroke.  Reduce blood flow and cause nerve damage to parts of your body, especially your feet. This can cause slow healing and pain when you walk.  Make your immune system weak and less able to fight infections. When people hear the word \"diabetes,\" they often think of problems like these. But daily care and treatment can help prevent or delay these problems. The goal is to keep your blood sugar in a target range. That's the best way to reduceyour chance of having more problems from diabetes. What are the symptoms? Some people who have type 2 diabetes may not have any symptoms early on.  Many people with the disease don't even know they have it at first. But with time, diabetes starts to cause symptoms. You have most symptoms of type 2 diabeteswhen your blood sugar is either too high or too low. The most common symptoms of high blood sugar include:   Thirst.   Needing to urinate often.  Weight loss.  Blurry vision. The symptoms of low blood sugar include:   Sweating.  Shakiness.  Weakness.  Hunger.  Confusion. You're not likely to get symptoms of low blood sugar unless you take insulin oruse certain diabetes medicines that lower blood sugar. How can you help prevent type 2 diabetes? There are things you can do to help prevent type 2 diabetes. Stay at a healthy weight. Exercise regularly, and eat healthy foods. Even small changes can make a difference. If you have prediabetes, the medicine metformin can help preventtype 2 diabetes. How is type 2 diabetes treated? Treatment for type 2 diabetes will change over time to meet your needs. But the focus of your treatment will usually be to keep your blood sugar levels in your target range. This will help prevent problems such as eye, kidney, heart, bloodvessel, and nerve disease. Some people may need medicines to help their bodies make insulin or decrease insulin resistance. Some medicines slow down how quickly the body absorbscarbohydrates. Treatment to manage type 2 diabetes includes:   Making healthy food choices and being active.  Losing weight, if you need to.  Seeing your doctor regularly.  Keeping your blood sugar in your target range.  Taking medicines, if you need them.  Quitting smoking, if you smoke.  Keeping your blood pressure and cholesterol under control. Follow-up care is a key part of your treatment and safety. Be sure to make and go to all appointments, and call your doctor if you are having problems. It's also a good idea to know your test results and keep alist of the medicines you take. Where can you learn more? Go to [email or web address] org and sign in to your [employer] account. Enter U946 in the [facility] box to learn more about \"Learning About Type 2 Diabetes. \"     If you do not have an account, please click on the \"Sign Up Now\" link. Current as of: July 28, 2021               Content Version: 13.2  © 3808-1548 [employer]wise, Incorporated. Care instructions adapted under license by [facility] [place] ([facility]). If you have questions about a medical condition or this instruction, always ask your healthcare professional. [address] any warranty or liability for your use of this information.

## 2022-06-17 ENCOUNTER — HOSPITAL ENCOUNTER (INPATIENT)
Age: 57
LOS: 2 days | Discharge: HOME | DRG: 247 | End: 2022-06-19
Attending: EMERGENCY MEDICINE | Admitting: STUDENT IN AN ORGANIZED HEALTH CARE EDUCATION/TRAINING PROGRAM
Payer: COMMERCIAL

## 2022-06-17 ENCOUNTER — APPOINTMENT (OUTPATIENT)
Dept: GENERAL RADIOLOGY | Age: 57
DRG: 247 | End: 2022-06-17
Payer: COMMERCIAL

## 2022-06-17 ENCOUNTER — APPOINTMENT (OUTPATIENT)
Dept: CARDIAC CATH/INVASIVE PROCEDURES | Age: 57
DRG: 247 | End: 2022-06-17
Payer: COMMERCIAL

## 2022-06-17 DIAGNOSIS — R07.9 CHEST PAIN, UNSPECIFIED TYPE: ICD-10-CM

## 2022-06-17 DIAGNOSIS — I21.4 NSTEMI (NON-ST ELEVATED MYOCARDIAL INFARCTION) (HCC): Primary | ICD-10-CM

## 2022-06-17 LAB
ABSOLUTE EOS #: 0.1 K/UL (ref 0–0.44)
ABSOLUTE IMMATURE GRANULOCYTE: 0.04 K/UL (ref 0–0.3)
ABSOLUTE LYMPH #: 1.94 K/UL (ref 1.1–3.7)
ABSOLUTE MONO #: 0.68 K/UL (ref 0.1–1.2)
ANION GAP SERPL CALCULATED.3IONS-SCNC: 14 MMOL/L (ref 9–17)
BASOPHILS # BLD: 1 % (ref 0–2)
BASOPHILS ABSOLUTE: 0.06 K/UL (ref 0–0.2)
BUN BLDV-MCNC: 13 MG/DL (ref 6–20)
BUN/CREAT BLD: 15 (ref 9–20)
CALCIUM SERPL-MCNC: 9.3 MG/DL (ref 8.6–10.4)
CHLORIDE BLD-SCNC: 103 MMOL/L (ref 98–107)
CO2: 25 MMOL/L (ref 20–31)
CREAT SERPL-MCNC: 0.84 MG/DL (ref 0.7–1.2)
EKG ATRIAL RATE: 77 BPM
EKG ATRIAL RATE: 80 BPM
EKG P AXIS: 55 DEGREES
EKG P AXIS: 59 DEGREES
EKG P-R INTERVAL: 150 MS
EKG P-R INTERVAL: 154 MS
EKG Q-T INTERVAL: 364 MS
EKG Q-T INTERVAL: 380 MS
EKG QRS DURATION: 94 MS
EKG QRS DURATION: 96 MS
EKG QTC CALCULATION (BAZETT): 411 MS
EKG QTC CALCULATION (BAZETT): 438 MS
EKG R AXIS: 46 DEGREES
EKG R AXIS: 47 DEGREES
EKG T AXIS: 42 DEGREES
EKG T AXIS: 54 DEGREES
EKG VENTRICULAR RATE: 77 BPM
EKG VENTRICULAR RATE: 80 BPM
EOSINOPHILS RELATIVE PERCENT: 1 % (ref 1–4)
GFR AFRICAN AMERICAN: >60 ML/MIN
GFR NON-AFRICAN AMERICAN: >60 ML/MIN
GFR SERPL CREATININE-BSD FRML MDRD: ABNORMAL ML/MIN/{1.73_M2}
GLUCOSE BLD-MCNC: 204 MG/DL (ref 70–99)
HCT VFR BLD CALC: 44.7 % (ref 40.7–50.3)
HCT VFR BLD CALC: 46.4 % (ref 40.7–50.3)
HEMOGLOBIN: 15.1 G/DL (ref 13–17)
HEMOGLOBIN: 15.7 G/DL (ref 13–17)
IMMATURE GRANULOCYTES: 1 %
INR BLD: 0.9
INR BLD: 1
LV EF: 40 %
LV EF: 53 %
LVEF MODALITY: NORMAL
LVEF MODALITY: NORMAL
LYMPHOCYTES # BLD: 25 % (ref 24–43)
MCH RBC QN AUTO: 30 PG (ref 25.2–33.5)
MCH RBC QN AUTO: 30 PG (ref 25.2–33.5)
MCHC RBC AUTO-ENTMCNC: 33.8 G/DL (ref 28.4–34.8)
MCHC RBC AUTO-ENTMCNC: 33.8 G/DL (ref 28.4–34.8)
MCV RBC AUTO: 88.7 FL (ref 82.6–102.9)
MCV RBC AUTO: 88.7 FL (ref 82.6–102.9)
MONOCYTES # BLD: 9 % (ref 3–12)
NRBC AUTOMATED: 0 PER 100 WBC
NRBC AUTOMATED: 0 PER 100 WBC
PARTIAL THROMBOPLASTIN TIME: 24.8 SEC (ref 23.9–33.8)
PARTIAL THROMBOPLASTIN TIME: 28 SEC (ref 23.9–33.8)
PDW BLD-RTO: 12 % (ref 11.8–14.4)
PDW BLD-RTO: 12.1 % (ref 11.8–14.4)
PLATELET # BLD: 201 K/UL (ref 138–453)
PLATELET # BLD: 208 K/UL (ref 138–453)
PMV BLD AUTO: 10.8 FL (ref 8.1–13.5)
PMV BLD AUTO: 10.9 FL (ref 8.1–13.5)
POTASSIUM SERPL-SCNC: 4.2 MMOL/L (ref 3.7–5.3)
PRO-BNP: 99 PG/ML
PROTHROMBIN TIME: 12.6 SEC (ref 11.5–14.2)
PROTHROMBIN TIME: 13.5 SEC (ref 11.5–14.2)
RBC # BLD: 5.04 M/UL (ref 4.21–5.77)
RBC # BLD: 5.23 M/UL (ref 4.21–5.77)
SEG NEUTROPHILS: 63 % (ref 36–65)
SEGMENTED NEUTROPHILS ABSOLUTE COUNT: 4.84 K/UL (ref 1.5–8.1)
SODIUM BLD-SCNC: 142 MMOL/L (ref 135–144)
TROPONIN, HIGH SENSITIVITY: 101 NG/L (ref 0–22)
TROPONIN, HIGH SENSITIVITY: 283 NG/L (ref 0–22)
TROPONIN, HIGH SENSITIVITY: 29 NG/L (ref 0–22)
WBC # BLD: 7.7 K/UL (ref 3.5–11.3)
WBC # BLD: 9.3 K/UL (ref 3.5–11.3)

## 2022-06-17 PROCEDURE — 2580000003 HC RX 258: Performed by: NURSE PRACTITIONER

## 2022-06-17 PROCEDURE — 80048 BASIC METABOLIC PNL TOTAL CA: CPT

## 2022-06-17 PROCEDURE — 83880 ASSAY OF NATRIURETIC PEPTIDE: CPT

## 2022-06-17 PROCEDURE — 2580000003 HC RX 258: Performed by: INTERNAL MEDICINE

## 2022-06-17 PROCEDURE — 96374 THER/PROPH/DIAG INJ IV PUSH: CPT

## 2022-06-17 PROCEDURE — 93458 L HRT ARTERY/VENTRICLE ANGIO: CPT

## 2022-06-17 PROCEDURE — 84484 ASSAY OF TROPONIN QUANT: CPT

## 2022-06-17 PROCEDURE — 2580000003 HC RX 258: Performed by: EMERGENCY MEDICINE

## 2022-06-17 PROCEDURE — C9600 PERC DRUG-EL COR STENT SING: HCPCS

## 2022-06-17 PROCEDURE — B2111ZZ FLUOROSCOPY OF MULTIPLE CORONARY ARTERIES USING LOW OSMOLAR CONTRAST: ICD-10-PCS | Performed by: INTERNAL MEDICINE

## 2022-06-17 PROCEDURE — 6360000002 HC RX W HCPCS: Performed by: NURSE PRACTITIONER

## 2022-06-17 PROCEDURE — C1725 CATH, TRANSLUMIN NON-LASER: HCPCS

## 2022-06-17 PROCEDURE — C1894 INTRO/SHEATH, NON-LASER: HCPCS

## 2022-06-17 PROCEDURE — 2060000000 HC ICU INTERMEDIATE R&B

## 2022-06-17 PROCEDURE — 6360000002 HC RX W HCPCS

## 2022-06-17 PROCEDURE — 6360000002 HC RX W HCPCS: Performed by: EMERGENCY MEDICINE

## 2022-06-17 PROCEDURE — 99285 EMERGENCY DEPT VISIT HI MDM: CPT

## 2022-06-17 PROCEDURE — 85027 COMPLETE CBC AUTOMATED: CPT

## 2022-06-17 PROCEDURE — 85730 THROMBOPLASTIN TIME PARTIAL: CPT

## 2022-06-17 PROCEDURE — 6370000000 HC RX 637 (ALT 250 FOR IP): Performed by: EMERGENCY MEDICINE

## 2022-06-17 PROCEDURE — 85610 PROTHROMBIN TIME: CPT

## 2022-06-17 PROCEDURE — 93306 TTE W/DOPPLER COMPLETE: CPT

## 2022-06-17 PROCEDURE — 2500000003 HC RX 250 WO HCPCS

## 2022-06-17 PROCEDURE — 6370000000 HC RX 637 (ALT 250 FOR IP)

## 2022-06-17 PROCEDURE — B2151ZZ FLUOROSCOPY OF LEFT HEART USING LOW OSMOLAR CONTRAST: ICD-10-PCS | Performed by: INTERNAL MEDICINE

## 2022-06-17 PROCEDURE — 2500000003 HC RX 250 WO HCPCS: Performed by: NURSE PRACTITIONER

## 2022-06-17 PROCEDURE — 6360000004 HC RX CONTRAST MEDICATION

## 2022-06-17 PROCEDURE — 4A023N7 MEASUREMENT OF CARDIAC SAMPLING AND PRESSURE, LEFT HEART, PERCUTANEOUS APPROACH: ICD-10-PCS | Performed by: INTERNAL MEDICINE

## 2022-06-17 PROCEDURE — 2709999900 HC NON-CHARGEABLE SUPPLY

## 2022-06-17 PROCEDURE — APPSS45 APP SPLIT SHARED TIME 31-45 MINUTES: Performed by: NURSE PRACTITIONER

## 2022-06-17 PROCEDURE — C1874 STENT, COATED/COV W/DEL SYS: HCPCS

## 2022-06-17 PROCEDURE — 85025 COMPLETE CBC W/AUTO DIFF WBC: CPT

## 2022-06-17 PROCEDURE — 99222 1ST HOSP IP/OBS MODERATE 55: CPT | Performed by: NURSE PRACTITIONER

## 2022-06-17 PROCEDURE — 96375 TX/PRO/DX INJ NEW DRUG ADDON: CPT

## 2022-06-17 PROCEDURE — 6370000000 HC RX 637 (ALT 250 FOR IP): Performed by: INTERNAL MEDICINE

## 2022-06-17 PROCEDURE — A4216 STERILE WATER/SALINE, 10 ML: HCPCS | Performed by: NURSE PRACTITIONER

## 2022-06-17 PROCEDURE — 6370000000 HC RX 637 (ALT 250 FOR IP): Performed by: NURSE PRACTITIONER

## 2022-06-17 PROCEDURE — 027035Z DILATION OF CORONARY ARTERY, ONE ARTERY WITH TWO DRUG-ELUTING INTRALUMINAL DEVICES, PERCUTANEOUS APPROACH: ICD-10-PCS | Performed by: INTERNAL MEDICINE

## 2022-06-17 PROCEDURE — C1769 GUIDE WIRE: HCPCS

## 2022-06-17 PROCEDURE — 71045 X-RAY EXAM CHEST 1 VIEW: CPT

## 2022-06-17 PROCEDURE — 93005 ELECTROCARDIOGRAM TRACING: CPT | Performed by: EMERGENCY MEDICINE

## 2022-06-17 PROCEDURE — C1887 CATHETER, GUIDING: HCPCS

## 2022-06-17 PROCEDURE — 36415 COLL VENOUS BLD VENIPUNCTURE: CPT

## 2022-06-17 PROCEDURE — 93005 ELECTROCARDIOGRAM TRACING: CPT | Performed by: INTERNAL MEDICINE

## 2022-06-17 RX ORDER — HEPARIN SODIUM 1000 [USP'U]/ML
4000 INJECTION, SOLUTION INTRAVENOUS; SUBCUTANEOUS ONCE
Status: COMPLETED | OUTPATIENT
Start: 2022-06-17 | End: 2022-06-17

## 2022-06-17 RX ORDER — SODIUM CHLORIDE 0.9 % (FLUSH) 0.9 %
5-40 SYRINGE (ML) INJECTION EVERY 12 HOURS SCHEDULED
Status: DISCONTINUED | OUTPATIENT
Start: 2022-06-17 | End: 2022-06-19 | Stop reason: HOSPADM

## 2022-06-17 RX ORDER — NITROGLYCERIN 0.4 MG/1
0.4 TABLET SUBLINGUAL ONCE
Status: COMPLETED | OUTPATIENT
Start: 2022-06-17 | End: 2022-06-17

## 2022-06-17 RX ORDER — HEPARIN SODIUM 10000 [USP'U]/100ML
5-30 INJECTION, SOLUTION INTRAVENOUS CONTINUOUS
Status: DISCONTINUED | OUTPATIENT
Start: 2022-06-17 | End: 2022-06-17 | Stop reason: SDUPTHER

## 2022-06-17 RX ORDER — ACETAMINOPHEN 650 MG/1
650 SUPPOSITORY RECTAL EVERY 6 HOURS PRN
Status: DISCONTINUED | OUTPATIENT
Start: 2022-06-17 | End: 2022-06-19 | Stop reason: HOSPADM

## 2022-06-17 RX ORDER — MORPHINE SULFATE 4 MG/ML
4 INJECTION, SOLUTION INTRAMUSCULAR; INTRAVENOUS ONCE
Status: COMPLETED | OUTPATIENT
Start: 2022-06-17 | End: 2022-06-17

## 2022-06-17 RX ORDER — SODIUM CHLORIDE 9 MG/ML
INJECTION, SOLUTION INTRAVENOUS CONTINUOUS
Status: DISCONTINUED | OUTPATIENT
Start: 2022-06-17 | End: 2022-06-19 | Stop reason: HOSPADM

## 2022-06-17 RX ORDER — HEPARIN SODIUM 1000 [USP'U]/ML
60 INJECTION, SOLUTION INTRAVENOUS; SUBCUTANEOUS ONCE
Status: DISCONTINUED | OUTPATIENT
Start: 2022-06-17 | End: 2022-06-17 | Stop reason: SDUPTHER

## 2022-06-17 RX ORDER — ASPIRIN 81 MG/1
81 TABLET ORAL DAILY
Status: DISCONTINUED | OUTPATIENT
Start: 2022-06-18 | End: 2022-06-19 | Stop reason: HOSPADM

## 2022-06-17 RX ORDER — SODIUM CHLORIDE 9 MG/ML
INJECTION, SOLUTION INTRAVENOUS CONTINUOUS
Status: ACTIVE | OUTPATIENT
Start: 2022-06-17 | End: 2022-06-17

## 2022-06-17 RX ORDER — ONDANSETRON 2 MG/ML
4 INJECTION INTRAMUSCULAR; INTRAVENOUS EVERY 6 HOURS PRN
Status: DISCONTINUED | OUTPATIENT
Start: 2022-06-17 | End: 2022-06-19 | Stop reason: HOSPADM

## 2022-06-17 RX ORDER — ONDANSETRON 4 MG/1
4 TABLET, ORALLY DISINTEGRATING ORAL EVERY 8 HOURS PRN
Status: DISCONTINUED | OUTPATIENT
Start: 2022-06-17 | End: 2022-06-19 | Stop reason: HOSPADM

## 2022-06-17 RX ORDER — SODIUM CHLORIDE 0.9 % (FLUSH) 0.9 %
5-40 SYRINGE (ML) INJECTION PRN
Status: DISCONTINUED | OUTPATIENT
Start: 2022-06-17 | End: 2022-06-19 | Stop reason: HOSPADM

## 2022-06-17 RX ORDER — ATORVASTATIN CALCIUM 40 MG/1
40 TABLET, FILM COATED ORAL NIGHTLY
Status: DISCONTINUED | OUTPATIENT
Start: 2022-06-17 | End: 2022-06-19 | Stop reason: HOSPADM

## 2022-06-17 RX ORDER — POTASSIUM CHLORIDE 7.45 MG/ML
10 INJECTION INTRAVENOUS PRN
Status: DISCONTINUED | OUTPATIENT
Start: 2022-06-17 | End: 2022-06-19 | Stop reason: HOSPADM

## 2022-06-17 RX ORDER — NITROGLYCERIN 0.4 MG/1
0.4 TABLET SUBLINGUAL EVERY 5 MIN PRN
Status: DISCONTINUED | OUTPATIENT
Start: 2022-06-17 | End: 2022-06-19 | Stop reason: HOSPADM

## 2022-06-17 RX ORDER — ASPIRIN 81 MG/1
324 TABLET, CHEWABLE ORAL ONCE
Status: COMPLETED | OUTPATIENT
Start: 2022-06-17 | End: 2022-06-17

## 2022-06-17 RX ORDER — ACETAMINOPHEN 325 MG/1
650 TABLET ORAL EVERY 6 HOURS PRN
Status: DISCONTINUED | OUTPATIENT
Start: 2022-06-17 | End: 2022-06-17

## 2022-06-17 RX ORDER — SODIUM CHLORIDE 0.9 % (FLUSH) 0.9 %
10 SYRINGE (ML) INJECTION PRN
Status: DISCONTINUED | OUTPATIENT
Start: 2022-06-17 | End: 2022-06-19 | Stop reason: HOSPADM

## 2022-06-17 RX ORDER — SODIUM CHLORIDE 9 MG/ML
INJECTION, SOLUTION INTRAVENOUS PRN
Status: DISCONTINUED | OUTPATIENT
Start: 2022-06-17 | End: 2022-06-19 | Stop reason: HOSPADM

## 2022-06-17 RX ORDER — HEPARIN SODIUM 1000 [USP'U]/ML
2000 INJECTION, SOLUTION INTRAVENOUS; SUBCUTANEOUS PRN
Status: DISCONTINUED | OUTPATIENT
Start: 2022-06-17 | End: 2022-06-17 | Stop reason: SDUPTHER

## 2022-06-17 RX ORDER — ACETAMINOPHEN 325 MG/1
650 TABLET ORAL EVERY 4 HOURS PRN
Status: DISCONTINUED | OUTPATIENT
Start: 2022-06-17 | End: 2022-06-19 | Stop reason: HOSPADM

## 2022-06-17 RX ORDER — HEPARIN SODIUM 1000 [USP'U]/ML
2000 INJECTION, SOLUTION INTRAVENOUS; SUBCUTANEOUS PRN
Status: DISCONTINUED | OUTPATIENT
Start: 2022-06-17 | End: 2022-06-19 | Stop reason: HOSPADM

## 2022-06-17 RX ORDER — POTASSIUM CHLORIDE 20 MEQ/1
40 TABLET, EXTENDED RELEASE ORAL PRN
Status: DISCONTINUED | OUTPATIENT
Start: 2022-06-17 | End: 2022-06-19 | Stop reason: HOSPADM

## 2022-06-17 RX ORDER — HEPARIN SODIUM 1000 [USP'U]/ML
4000 INJECTION, SOLUTION INTRAVENOUS; SUBCUTANEOUS PRN
Status: DISCONTINUED | OUTPATIENT
Start: 2022-06-17 | End: 2022-06-19 | Stop reason: HOSPADM

## 2022-06-17 RX ORDER — HEPARIN SODIUM 10000 [USP'U]/100ML
5-30 INJECTION, SOLUTION INTRAVENOUS CONTINUOUS
Status: DISCONTINUED | OUTPATIENT
Start: 2022-06-17 | End: 2022-06-19 | Stop reason: HOSPADM

## 2022-06-17 RX ORDER — ONDANSETRON 2 MG/ML
4 INJECTION INTRAMUSCULAR; INTRAVENOUS EVERY 6 HOURS PRN
Status: DISCONTINUED | OUTPATIENT
Start: 2022-06-17 | End: 2022-06-17

## 2022-06-17 RX ORDER — FENTANYL CITRATE 50 UG/ML
25 INJECTION, SOLUTION INTRAMUSCULAR; INTRAVENOUS
Status: ACTIVE | OUTPATIENT
Start: 2022-06-17 | End: 2022-06-17

## 2022-06-17 RX ORDER — HEPARIN SODIUM 1000 [USP'U]/ML
4000 INJECTION, SOLUTION INTRAVENOUS; SUBCUTANEOUS PRN
Status: DISCONTINUED | OUTPATIENT
Start: 2022-06-17 | End: 2022-06-17 | Stop reason: SDUPTHER

## 2022-06-17 RX ORDER — CARVEDILOL 6.25 MG/1
6.25 TABLET ORAL 2 TIMES DAILY WITH MEALS
Status: DISCONTINUED | OUTPATIENT
Start: 2022-06-17 | End: 2022-06-19 | Stop reason: HOSPADM

## 2022-06-17 RX ORDER — METFORMIN HYDROCHLORIDE 500 MG/1
2000 TABLET, EXTENDED RELEASE ORAL
Status: DISCONTINUED | OUTPATIENT
Start: 2022-06-18 | End: 2022-06-19 | Stop reason: HOSPADM

## 2022-06-17 RX ORDER — MAGNESIUM SULFATE 1 G/100ML
1000 INJECTION INTRAVENOUS PRN
Status: DISCONTINUED | OUTPATIENT
Start: 2022-06-17 | End: 2022-06-19 | Stop reason: HOSPADM

## 2022-06-17 RX ADMIN — SODIUM CHLORIDE: 9 INJECTION, SOLUTION INTRAVENOUS at 18:01

## 2022-06-17 RX ADMIN — ACETAMINOPHEN 325MG 650 MG: 325 TABLET ORAL at 20:22

## 2022-06-17 RX ADMIN — ATORVASTATIN CALCIUM 40 MG: 40 TABLET, FILM COATED ORAL at 20:22

## 2022-06-17 RX ADMIN — Medication 0.4 MG: at 10:24

## 2022-06-17 RX ADMIN — HEPARIN SODIUM AND DEXTROSE 10.5 UNITS/KG/HR: 10000; 5 INJECTION INTRAVENOUS at 10:41

## 2022-06-17 RX ADMIN — SODIUM CHLORIDE, PRESERVATIVE FREE 20 MG: 5 INJECTION INTRAVENOUS at 12:09

## 2022-06-17 RX ADMIN — HEPARIN SODIUM AND DEXTROSE 10.5 UNITS/KG/HR: 10000; 5 INJECTION INTRAVENOUS at 12:14

## 2022-06-17 RX ADMIN — ASPIRIN 81 MG CHEWABLE TABLET 324 MG: 81 TABLET CHEWABLE at 07:44

## 2022-06-17 RX ADMIN — HEPARIN SODIUM 4000 UNITS: 1000 INJECTION INTRAVENOUS; SUBCUTANEOUS at 10:37

## 2022-06-17 RX ADMIN — SODIUM CHLORIDE: 9 INJECTION, SOLUTION INTRAVENOUS at 10:39

## 2022-06-17 RX ADMIN — ALUMINA, MAGNESIA, AND SIMETHICONE ORAL SUSPENSION REGULAR STRENGTH: 1200; 1200; 120 SUSPENSION ORAL at 07:44

## 2022-06-17 RX ADMIN — MORPHINE SULFATE 4 MG: 4 INJECTION, SOLUTION INTRAMUSCULAR; INTRAVENOUS at 07:44

## 2022-06-17 RX ADMIN — TICAGRELOR 90 MG: 90 TABLET ORAL at 20:24

## 2022-06-17 RX ADMIN — SODIUM CHLORIDE, PRESERVATIVE FREE 20 MG: 5 INJECTION INTRAVENOUS at 20:23

## 2022-06-17 RX ADMIN — CARVEDILOL 6.25 MG: 6.25 TABLET, FILM COATED ORAL at 18:11

## 2022-06-17 ASSESSMENT — PAIN - FUNCTIONAL ASSESSMENT
PAIN_FUNCTIONAL_ASSESSMENT: ACTIVITIES ARE NOT PREVENTED
PAIN_FUNCTIONAL_ASSESSMENT: 0-10

## 2022-06-17 ASSESSMENT — ENCOUNTER SYMPTOMS
VOMITING: 0
CONSTIPATION: 0
SINUS PAIN: 0
BACK PAIN: 0
PHOTOPHOBIA: 0
DIARRHEA: 0
ABDOMINAL PAIN: 0
WHEEZING: 0
COUGH: 0
SINUS PRESSURE: 0
SHORTNESS OF BREATH: 1
NAUSEA: 0

## 2022-06-17 ASSESSMENT — PAIN SCALES - GENERAL
PAINLEVEL_OUTOF10: 3
PAINLEVEL_OUTOF10: 0
PAINLEVEL_OUTOF10: 1
PAINLEVEL_OUTOF10: 0
PAINLEVEL_OUTOF10: 7

## 2022-06-17 ASSESSMENT — PAIN SCALES - WONG BAKER: WONGBAKER_NUMERICALRESPONSE: 0

## 2022-06-17 NOTE — CONSULTS
Reason for Consult:  NSTEMI  Requesting Physician: Pardeep Case MD    CHIEF COMPLAINT:  Chest pain    History Obtained From:  patient, electronic medical record    HISTORY OF PRESENT ILLNESS:      The patient is a 61 y.o. male with significant past medical history of diabetes and hyperlipidemia that has no known prior cardiac history and does not follow with a cardiologist who presents with chest pain/pressure with shortness of breath that occurred at rest and not with exertion that has been intermittent for the past week and worse this morning and he came to the hospital.  He was admitted for further observation and found to have elevated troponin this admission and is currently on a heparin drip and free of chest pain. Denies any associated palpitations, syncope, or near syncope. He has not had recurrence of his pain since admission. Past Medical History:    Past Medical History:   Diagnosis Date    [facility]di[facility] mellitus) ([facility] [address]     Hyperlipidemia      Past Surgical History:    Past Surgical History:   Procedure Laterality Date    COLONOSCOPY       Home Medications:  Prior to Admission medications    Medication Sig Start Date End Date Taking? Authorizing Provider   metFORMIN (GLUCOPHAGE-XR) 500 mg extended release tablet Take 4 tablets by mouth daily (with breakfast) 6/7/22 9/5/22  CLARY Boswell CNP   atorvastatin (LIPITOR) 20 MG tablet TAKE ONE TABLET BY MOUTH DAILY 6/7/22   Vijayious CLARY Hopkins CNP   glucose monitoring kit (FREESTYLE) monitoring kit 1 kit by Does not apply route daily 1/29/20   Sameer Menendez, DO   blood glucose test strips (ACCU-CHEK COMPACT TEST DRUM) strip 1 each by In Vitro route daily As needed.  1/29/20   Sameer Menendez, DO   Lancets MISC 1 each by Does not apply route daily 1/29/20   Sameer Menendez, DO   Respiratory Therapy Supplies KEILY 1 Device by Does not apply route nightly NASAL CPAP per recommendations of sleep medicine physician Dx : severe sleep apnea 7/17/19   Judith Baez DO     Current Medications:    Current Facility-Administered Medications   Medication Dose Route Frequency Provider Last Rate Last Admin    0.9 % sodium chloride infusion   IntraVENous Continuous Karla Jacob,  mL/hr at 06/17/22 1039 New Bag at 06/17/22 1039    [START ON 6/18/2022] metFORMIN (GLUCOPHAGE-XR) extended release tablet 2,000 mg  2,000 mg Oral Daily with breakfast Darliss Marco A, APRN - NP        sodium chloride flush 0.9 % injection 5-40 mL  5-40 mL IntraVENous 2 times per day Darliss Marco A, APRN - NP        sodium chloride flush 0.9 % injection 10 mL  10 mL IntraVENous PRN Darliss Marco A, APRN - NP        0.9 % sodium chloride infusion   IntraVENous PRN Darliss Marco A, APRN - NP        ondansetron (ZOFRAN-ODT) disintegrating tablet 4 mg  4 mg Oral Q8H PRN Darliss Marco A, APRN - NP        Or    ondansetron [facility]) injection 4 mg  4 mg IntraVENous Q6H PRN Darliss Marco A, APRN - NP        acetaminophen (TYLENOL) tablet 650 mg  650 mg Oral Q6H PRN Darliss Marco A, APRN - NP        Or   Neha Villaseñor acetaminophen (TYLENOL) suppository 650 mg  650 mg Rectal Q6H PRN Darliss Marco A, APRN - NP        magnesium hydroxide (MILK OF MAGNESIA) 400 MG/5ML suspension 30 mL  30 mL Oral Daily PRN Darliss Marco A, APRN - NP       Neha Charleen Reyes Grit ON 6/18/2022] aspirin EC tablet 325 mg  325 mg Oral Daily Darliss Marco A, APRN - NP        potassium chloride (KLOR-CON M) extended release tablet 40 mEq  40 mEq Oral PRN Darliss Marco A, APRN - NP        Or    potassium bicarb-citric acid (EFFER-K) effervescent tablet 40 mEq  40 mEq Oral PRN Darliss Marco A, APRN - NP        Or    potassium chloride 10 mEq/100 mL IVPB (Peripheral Line)  10 mEq IntraVENous PRN Darliss Marco A, APRN - NP        potassium chloride 10 mEq/100 mL IVPB (Peripheral Line)  10 mEq IntraVENous PRN Darliss Marco A, APRN - NP        magnesium sulfate 1000 mg in dextrose 5% 100 mL IVPB  1,000 mg IntraVENous PRN Tierra Strickland, APRN - NP        atorvastatin (LIPITOR) tablet 40 mg  40 mg Oral Nightly Bobie Eth, APRN - NP        nitroGLYCERIN (NITROSTAT) SL tablet 0.4 mg  0.4 mg SubLINGual Q5 Min PRN Bobie Eth, APRN - NP        perflutren lipid microspheres (DEFINITY) injection 1.65 mg  1.5 mL IntraVENous ONCE PRN Bobie Eth, APRN - NP        heparin (porcine) injection 4,000 Units  4,000 Units IntraVENous PRN Bobie Eth, APRN - NP        heparin (porcine) injection 2,000 Units  2,000 Units IntraVENous PRN Bobie Eth, APRN - NP        heparin 25,000 units in dextrose 5% 250 mL (premix) infusion  5-30 Units/kg/hr IntraVENous Continuous Bobie Eth, APRN - NP 10 mL/hr at 06/17/22 1214 10.5 Units/kg/hr at 06/17/22 1214    famotidine (PEPCID) 20 mg in sodium chloride (PF) 10 mL injection  20 mg IntraVENous BID Bobtavon Eth, APRN - NP   20 mg at 06/17/22 1209     Allergies:  Patient has no known allergies. Social History:    Social History     Socioeconomic History    Marital status:      Spouse name: Not on file    Number of children: Not on file    Years of education: Not on file    Highest education level: Not on file   Occupational History    Not on file   Tobacco Use    Smoking status: Never Smoker    Smokeless tobacco: Never Used   Vaping Use    Vaping Use: Never used   Substance and Sexual Activity    Alcohol use:  Yes     Alcohol/week: 2.0 standard drinks     Types: 2 Cans of beer per week     Comment: beer once a month, glass of wine twice a week    Drug use: No    Sexual activity: Not on file   Other Topics Concern    Not on file   Social History Narrative    Not on file     Social Determinants of Health     Financial Resource Strain: Low Risk     Difficulty of Paying Living Expenses: Not hard at all   Food Insecurity: No Food Insecurity    Worried About Running Out of Food in the Last Year: Never true    Dash of Food in the Last Year: Never true   Transportation Needs:     Lack of Transportation (Medical): Not on file    Lack of Transportation (Non-Medical):  Not on file   Physical Activity:     Days of Exercise per Week: Not on file    Minutes of Exercise per Session: Not on file   Stress:     Feeling of Stress : Not on file   Social Connections:     Frequency of Communication with Friends and Family: Not on file    Frequency of Social Gatherings with Friends and Family: Not on file    Attends [employer] Services: Not on file    Active Member of [address] [place] [address] or Organizations: Not on file    Attends Club or Organization Meetings: Not on file    Marital Status: Not on file   Intimate Partner Violence:     Fear of Current or Ex-Partner: Not on file    Emotionally Abused: Not on file    Physically Abused: Not on file    Sexually Abused: Not on file   Housing Stability:     Unable to Pay for Housing in the Last Year: Not on file    Number of [place] in the Last Year: Not on file    Unstable Housing in the Last Year: Not on file     Family History:   Family History   Problem Relation Age of Onset    High Cholesterol Mother     Diabetes Mother     Heart Disease Father        · REVIEW OF SYSTEMS   CONSTITUTIONAL: negative  EYES:  negative  HEENT:  negative  RESPIRATORY: negative except for  dyspnea   CARDIOVASCULAR:  negative except for  chest pain, dyspnea  GASTROINTESTINAL:  negative  GENITOURINARY:  negative  INTEGUMENT:  negative  HEMATOLOGIC/LYMPHATIC:  negative  ALLERGIC/IMMUNOLOGIC:  negative  ENDOCRINE:  negative except for diabetes for 2 years  MUSCULOSKELETAL:  negative  NEUROLOGICAL:  negative  BEHAVIOR/PSYCH:  negative    PHYSICAL EXAM:    Vitals:    VITALS:  /79   Pulse 65   Temp 97.9 °F (36.6 °C) (Oral)   Resp 16   Ht 5' 8\" (1.727 m)   Wt 210 lb (95.3 kg)   SpO2 93%   BMI 31.93 kg/m²   24HR INTAKE/OUTPUT:  No intake or output data in the 24 hours ending 06/17/22 1324    CONSTITUTIONAL:  awake, alert, cooperative, no apparent distress, and appears stated age  EYES: Sclera clear, conjunctiva normal  ENT:  normocepalic, without obvious abnormality  NECK:  supple, symmetrical, trachea midline, no carotid bruit, no JVD  BACK:  symmetric  LUNGS: Non-labored, good air exchange, clear to auscultation bilaterally, no crackles or wheezing  CARDIOVASCULAR:  Regular rate and rhythm, normal S1 and S2, no S3 or S4, and no murmur noted, no rub.  dorsalis pedis, posterior tibial, femoral and bilateralpresent 2+, 2+ right radial pulse  Normal Michael test on the right  ABDOMEN:  Normal bowel sounds, soft, non-distended, non-tender  MUSCULOSKELETAL:  there is no redness, warmth, or swelling of the joints  No leg edema. NEUROLOGIC:  Awake, alert, oriented to name, place and time.   SKIN:  no bruising or bleeding, normal skin color, texture, turgor and no jaundice    DATA:   ECG:      ECHO: pending    Cardiology Labs:  Recent Labs     06/17/22  0727 06/17/22  0922 06/17/22  1221   TROPHS 29* 101* 283*     Warfarin PT/INR:  Lab Results   Component Value Date    PROTIME 13.5 06/17/2022    INR 1.0 06/17/2022     CBC:  Lab Results   Component Value Date    WBC 9.3 06/17/2022    RBC 5.04 06/17/2022    HGB 15.1 06/17/2022    HCT 44.7 06/17/2022    MCV 88.7 06/17/2022    MCH 30.0 06/17/2022    MCHC 33.8 06/17/2022    RDW 12.1 06/17/2022     06/17/2022    MPV 10.8 06/17/2022     CMP:  Lab Results   Component Value Date     06/17/2022    K 4.2 06/17/2022     06/17/2022    CO2 25 06/17/2022    BUN 13 06/17/2022    CREATININE 0.84 06/17/2022    GFRAA >60 06/17/2022    LABGLOM >60 06/17/2022    GLUCOSE 204 06/17/2022    CALCIUM 9.3 06/17/2022     Magnesium:  No results found for: MG  PTT:    Lab Results   Component Value Date    APTT 28.0 06/17/2022     TSH:    Lab Results   Component Value Date    TSH 2.81 06/01/2022     BNP:   Recent Labs     06/17/22  0727   PROBNP 99     BMP:  Lab Results   Component Value Date     06/17/2022    K 4.2 06/17/2022     06/17/2022    CO2 25 06/17/2022    BUN 13 06/17/2022    LABALBU 4.7 06/01/2022    CREATININE 0.84 06/17/2022    CALCIUM 9.3 06/17/2022    GFRAA >60 06/17/2022    LABGLOM >60 06/17/2022    GLUCOSE 204 06/17/2022     LIVER PROFILE:No results for input(s): AST, ALT, LABALBU, ALKPHOS, BILITOT, BILIDIR, IBILI, PROT, GLOB, ALBUMIN in the last 72 hours. FLP:    Lab Results   Component Value Date    CHOL 177 06/01/2022    TRIG 138 06/01/2022    HDL 40 06/01/2022    LDLCHOLESTEROL 109 06/01/2022     IMPRESSION    · Non-STEMI, currently free of angina  · Diabetes for 2 years  · Hyperlipidemia, treated  · Family history of CAD, both parents had bypass surgery in their 60-70's  · Normal Michael test on the right    RECOMMENDATIONS:     Continue current cardiac medications. Patient is currently free of chest pain. Options of treatment of conservative versus invasive were discussed with the patient and multiple family members at the bedside. He is agreeable for left heart catheterization possible PCI, indications, risk, and benefits were discussed with him and plan for left heart catheterization today at 230 pm. Management plan was discussed with patient, multiple family members at the bedside, RN, and Dr. Prince Carter.   Thank you for the consultation    Electronically signed by CLARY Rowley CNP on 6/17/2022 at 1:24 PM     CC: Atha Bumpers, APRN - CNP

## 2022-06-17 NOTE — ED PROVIDER NOTES
[facility]  Emergency Department Encounter     Pt Name: Earline Moran  MRN: [id number]  [place] 1965  Date of evaluation: 6/17/22  PCP:  CLARY Zamora CNP    CHIEF COMPLAINT       Chief Complaint   Patient presents with    Chest Pain     for past week. worse this morning        HISTORY OF PRESENT ILLNESS  (Location/Symptom, Timing/Onset, Context/Setting, Quality, Duration, Modifying Factors, Severity.)    Earline Moran is a 61 y.o. male who presents with intermittent episodes of midsternal chest burning and pressure associated with shortness of breath that is been going on for the past week. He has a history of GERD and just thought that it was his reflux, however when he woke up this morning the pain is worse than it has been the entire week so he decided come the emergency department for further evaluation. He has no past cardiac history, no history of CAD or stents in his heart. No prior heart attacks. However he does have a history of hyperlipidemia as well as diabetes. No history of high cholesterol, no tobacco use. PAST MEDICAL / SURGICAL / SOCIAL / FAMILY HISTORY    has a past medical history of DM (diabetes mellitus) ([address] and Hyperlipidemia. has a past surgical history that includes Colonoscopy. Social History     Socioeconomic History    Marital status:      Spouse name: Not on file    Number of children: Not on file    Years of education: Not on file    Highest education level: Not on file   Occupational History    Not on file   Tobacco Use    Smoking status: Never Smoker    Smokeless tobacco: Never Used   Vaping Use    Vaping Use: Never used   Substance and Sexual Activity    Alcohol use:  Yes     Alcohol/week: 2.0 standard drinks     Types: 2 Cans of beer per week     Comment: beer once a month, glass of wine twice a week    Drug use: No    Sexual activity: Not on file   Other Topics Concern    Not on file Detail Level: Generalized Social History Narrative    Not on file     Social Determinants of Health     Financial Resource Strain: Low Risk     Difficulty of Paying Living Expenses: Not hard at all   Food Insecurity: No Food Insecurity    Worried About Running Out of Food in the Last Year: Never true    [address] in the Last Year: Never true   Transportation Needs:     Lack of Transportation (Medical): Not on file    Lack of Transportation (Non-Medical): Not on file   Physical Activity:     Days of Exercise per Week: Not on file    Minutes of Exercise per Session: Not on file   Stress:     Feeling of Stress : Not on file   Social Connections:     Frequency of Communication with Friends and Family: Not on file    Frequency of Social Gatherings with Friends and Family: Not on file    Attends [employer] Services: Not on file    Active Member of [address] [place] [address] [employer] or Organizations: Not on file    Attends Club or Organization Meetings: Not on file    Marital Status: Not on file   Intimate Partner Violence:     Fear of Current or Ex-Partner: Not on file    Emotionally Abused: Not on file    Physically Abused: Not on file    Sexually Abused: Not on file   Housing Stability:     Unable to Pay for Housing in the Last Year: Not on file    Number of [place] in the Last Year: Not on file    Unstable Housing in the Last Year: Not on file       Family History   Problem Relation Age of Onset    High Cholesterol Mother     Diabetes Mother     Heart Disease Father        Allergies:    Patient has no known allergies. Home Medications:  Prior to Admission medications    Medication Sig Start Date End Date Taking?  Authorizing Provider   metFORMIN (GLUCOPHAGE-XR) 500 mg extended release tablet Take 4 tablets by mouth daily (with breakfast) 6/7/22 9/5/22  CLARY Jackson CNP   atorvastatin (LIPITOR) 20 MG tablet TAKE ONE TABLET BY MOUTH DAILY 6/7/22   CLARY Jackson CNP   glucose monitoring kit (FREESTYLE) monitoring kit 1 kit by Does not apply route daily 1/29/20   Lady Romero, DO   blood glucose test strips (ACCU-CHEK COMPACT TEST DRUM) strip 1 each by In Vitro route daily As needed. 1/29/20   Lady Mayor, DO   Lancets MISC 1 each by Does not apply route daily 1/29/20   Lady Romero, DO   Respiratory Therapy Supplies KEILY 1 Device by Does not apply route nightly NASAL CPAP per recommendations of sleep medicine physician Dx : severe sleep apnea 7/17/19   Lady Romero, DO       REVIEW OF SYSTEMS    (2-9 systems for level 4, 10 or more for level 5)    Review of Systems   Constitutional: Negative for diaphoresis. Respiratory: Positive for shortness of breath. Cardiovascular: Positive for chest pain. Negative for palpitations and leg swelling. Gastrointestinal: Negative for nausea and vomiting. Genitourinary: Negative for flank pain. Musculoskeletal: Negative for back pain and neck pain. Neurological: Negative for dizziness, syncope, weakness and light-headedness. PHYSICAL EXAM   (up to 7 for level 4, 8 or more for level 5)    VITALS:   Vitals:    06/17/22 0709   BP: (!) 152/97   Pulse: 72   Resp: 18   Temp: 98.5 °F (36.9 °C)   TempSrc: Oral   SpO2: 96%   Weight: 210 lb (95.3 kg)   Height: 5' 8\" (1.727 m)       Physical Exam  Vitals and nursing note reviewed. Constitutional:       General: He is not in acute distress. Appearance: He is well-developed. He is obese. He is not diaphoretic. HENT:      Head: Normocephalic and atraumatic. Eyes:      Conjunctiva/sclera: Conjunctivae normal.   Cardiovascular:      Rate and Rhythm: Normal rate and regular rhythm. Heart sounds: Normal heart sounds. Pulmonary:      Effort: Pulmonary effort is normal. No respiratory distress. Breath sounds: Normal breath sounds. No wheezing, rhonchi or rales. Abdominal:      General: There is no distension. Palpations: Abdomen is soft. Tenderness: There is no abdominal tenderness.    Musculoskeletal: General: Normal range of motion. Cervical back: Normal range of motion. Right lower leg: No edema. Left lower leg: No edema. Skin:     General: Skin is warm and dry. Coloration: Skin is not pale. Neurological:      General: No focal deficit present. Mental Status: He is alert.    Psychiatric:         Behavior: Behavior normal.         DIFFERENTIAL  DIAGNOSIS   PLAN (LABS / IMAGING / EKG):  Orders Placed This Encounter   Procedures    XR CHEST 1 VIEW    CBC with Auto Differential    Basic Metabolic Panel    Troponin    Brain Natriuretic Peptide    Anti-Xa, Unfractionated Heparin    Protime-INR    APTT    Telemetry monitoring - continuous duration    Inpatient consult to Hospitalist    EKG 12 Lead    EKG 12 Lead    Insert peripheral IV       MEDICATIONS ORDERED:  Orders Placed This Encounter   Medications    aspirin chewable tablet 324 mg    morphine sulfate (PF) injection 4 mg    aluminum & magnesium hydroxide-simethicone (MAALOX) 30 mL, lidocaine viscous hcl (XYLOCAINE) 5 mL (GI COCKTAIL)    heparin (porcine) injection 4,000 Units    heparin (porcine) injection 4,000 Units    heparin (porcine) injection 2,000 Units    heparin 25,000 units in dextrose 5% 250 mL (premix) infusion    nitroGLYCERIN (NITROSTAT) SL tablet 0.4 mg    0.9 % sodium chloride infusion     DIAGNOSTIC RESULTS / EMERGENCYDEPARTMENT COURSE / MDM   LABS:  Labs Reviewed   CBC WITH AUTO DIFFERENTIAL - Abnormal; Notable for the following components:       Result Value    Immature Granulocytes 1 (*)     All other components within normal limits   BASIC METABOLIC PANEL - Abnormal; Notable for the following components:    Glucose 204 (*)     All other components within normal limits   TROPONIN - Abnormal; Notable for the following components:    Troponin, High Sensitivity 29 (*)     All other components within normal limits   TROPONIN - Abnormal; Notable for the following components:    Troponin, High Sensitivity 101 (*)     All other components within normal limits   BRAIN NATRIURETIC PEPTIDE   ANTI-XA, UNFRACTIONATED HEPARIN   ANTI-XA, UNFRACTIONATED HEPARIN   PROTIME-INR   APTT       RADIOLOGY:  XR CHEST 1 VIEW    Result Date: 6/17/2022  EXAMINATION: ONE XRAY VIEW OF THE CHEST 6/17/2022 7:35 am COMPARISON: 02/22/2016 HISTORY: ORDERING SYSTEM PROVIDED HISTORY: chest pain TECHNOLOGIST PROVIDED HISTORY: chest pain Reason for Exam: Chest pains for couple days. Worsening today FINDINGS: The lungs are underinflated, resulting in vascular crowding and bibasilar atelectasis. There is no focal consolidation, pleural effusion or pneumothorax. The heart and mediastinal contours are within normal limits. No acute bony findings are identified. Low lung volumes with no acute process identified.        EKG    EKG Interpretation    Interpreted by emergency department physician    Rhythm: normal sinus   Rate: normal  Axis: normal  Ectopy: none  Conduction: normal  ST Segments: no acute change  T Waves: no acute change  Q Waves: none    Clinical Impression: non-specific EKG    All EKG's are interpreted by the Emergency Department Physician whoeither signs or Co-signs this chart in the absence of a cardiologist.    EMERGENCY DEPARTMENT COURSE:  ED Course as of 06/17/22 1027   Fri Jun 17, 2022   0736 CBC with Auto Differential(!):    WBC 7.7   RBC 5.23   Hemoglobin Quant 15.7   Hematocrit 46.4   MCV 88.7   MCH 30.0   MCHC 33.8   RDW 12.0   Platelet Count 456   MPV 10.9   NRBC Automated 0.0   Seg Neutrophils 63   Lymphocytes 25   Monocytes 9   Eosinophils % 1   Basophils 1   Immature Granulocytes 1(!)   Segs Absolute 4.84   Absolute Lymph # 1.94   Absolute Mono # 0.68   Absolute Eos # 0.10   Basophils Absolute 0.06   Absolute Immature Granulocyte 0.04 [AO]   2230 Basic Metabolic Panel(!):    GLUCOSE, FASTING,(!)   BUN,BUNPL 13   Creatinine 0.84   Bun/Cre Ratio 15   CALCIUM, SERUM, 514485 9.3   Sodium 142   Potassium 4.2   Chloride 103   CO2 25   Anion Gap 14   GFR Non- >60   GFR  >60   GFR Comment      [AO]   0751 Brain Natriuretic Peptide:    Pro-BNP 99 [AO]   0751 Troponin(!):    Troponin, High Sensitivity 29(!) [AO]   0802 XR CHEST 1 VIEW [AO]   1004 Troponin(!!):    Troponin, High Sensitivity 101(!!) [AO]   1016 Repeat ekg no changes  [AO]   1020 Pt updated  [AO]   2820 Intermed accepted patient [AO]      ED Course User Index  [AO] Marly Barnes 1721, DO       MDM  Number of Diagnoses or Management Options     Amount and/or Complexity of Data Reviewed  Clinical lab tests: ordered and reviewed  Tests in the radiology section of CPT®: ordered and reviewed  Review and summarize past medical records: yes  Independent visualization of images, tracings, or specimens: yes    Patient Progress  Patient progress: stable      PROCEDURES:  Procedures     CONSULTS:  IP CONSULT TO HOSPITALIST    CRITICAL CARE:  NONE    FINAL IMPRESSION     1. NSTEMI (non-ST elevated myocardial infarction) ([facility] [address]    2. Chest pain, unspecified type       DISPOSITION / PLAN   DISPOSITION Decision To Admit 06/17/2022 10:20:44 AM    Karla Feliciano DO  Emergency Medicine Physician    (Please note that portions of this note were completed with a voice recognition program.  Efforts were made to edit the dictations but occasionally words are mis-transcribed.)        Marly Barnes 1721, DO  06/17/22 1027 Detail Level: Zone

## 2022-06-17 NOTE — PROGRESS NOTES
Writer spoke with cardiology regarding new consult. Gabbie Moe RN from cath lab called writer shortly after stating that Dr. Juliana Colunga plans to cath patient at 1430. Patient and patients family all updated. All questions answered. Patient verbalizes understanding to remain NPO. Heparin gtt still infusing and cath lab aware. Patient continues to deny chest pain at present time. NSR on monitor. BP stable.

## 2022-06-17 NOTE — PROCEDURES
Procedure: Transradial left heart catheterization with coronary angiography and left ventriculography multilesion angioplasty and stenting of proximal LAD and mid to distal LAD    Indications: non-ST segment elevation MI:    Postprocedure diagnosis: Multivessel coronary disease, successful angioplasty and stenting of very critical lesions with long stenosis affecting the proximal LAD as well as mid to distal LAD.   2.25 x 34 mm drug-eluting stent was used for the distal lesion and 3.5 x 34 mm drug-eluting stent used for the proximal lesion with excellent angiographic result  Mild LV size function with severe hypokinesis of the anterior wall and the apex ejection fraction is estimated to be around 45%    Plan dual antiplatelet therapy, Coreg, continue statin therapy, hold metformin resume 3 days, see orders, probable discharge in couple of days      MD Lamine Fuentes, APRN

## 2022-06-17 NOTE — PROGRESS NOTES
Transitions of Care Pharmacy Service   Medication Review    The patient's list of current home medications has been reviewed. His PCP office prescribes in [address] Source(s) of information: patient, Epic, Surescripts refill report      Please feel free to call me with any questions about this encounter. Thank you.     Margarita Sheth [address] [place] [address]   Transitions of Care Pharmacy Service  Phone:  [phone]  Fax: [phone]      Electronically signed by Margarita Sheth [address] [place] [address] on 6/17/2022 at 4:52 PM           Medications Prior to Admission:   metFORMIN (GLUCOPHAGE-XR) 500 mg extended release tablet, Take 4 tablets by mouth daily (with breakfast)  atorvastatin (LIPITOR) 20 MG tablet, TAKE ONE TABLET BY MOUTH DAILY

## 2022-06-17 NOTE — ED NOTES
ED to inpatient nurses report     Chief Complaint   Patient presents with    Chest Pain     for past week. worse this morning       Present to ED from home. C/o midsternal pain and some SOB x 1 week.    LOC: alert and orientated to name, place, date  Vital signs   Vitals:    06/17/22 0709   BP: (!) 152/97   Pulse: 72   Resp: 18   Temp: 98.5 °F (36.9 °C)   TempSrc: Oral   SpO2: 96%   Weight: 210 lb (95.3 kg)   Height: 5' 8\" (1.727 m)      Oxygen Baseline    Current needs required    LDAs:   Peripheral IV 06/17/22 Right Antecubital (Active)     Mobility: Independent  Fall Risk:    Pending ED orders:   Present condition:   Code Status:   Consults: IP CONSULT TO HOSPITALIST  IP CONSULT TO CARDIOLOGY  []  Hospitalist  Completed  [] yes [] no Who:   []  Medicine  Completed  [] yes [] No Who:   []  Cardiology  Completed  [] yes [] No Who:   []  GI   Completed  [] yes [] No Who:   []  Neurology  Completed  [] yes [] No Who:   []  Nephrology Completed  [] yes [] No Who:    []  Vascular  Completed  [] yes [] No Who:   []  Ortho  Completed  [] yes [] No Who:     []  Surgery  Completed  [] yes [] No Who:    []  Urology  Completed  [] yes [] No Who:    []  CT Surgery Completed  [] yes [] No Who:   []  Podiatry  Completed  [] yes [] No Who:    []  Other    Completed  [] yes [] No Who:  Interventions: Heparin bolus and drip, NS, ASA, MS, GI cocktail  Important Events: no cardiac history    Electronically signed by Gianna Zepeda RN on 6/17/2022 at 10:51 AM       Michael Coello RN  06/17/22 1285

## 2022-06-17 NOTE — H&P
[facility]  Office: [address], DO, Lobito Espino DO, Marlen Rousseau, DO, Juliana Vu, DO, Ayan Duran MD, Pernell Johnson MD, Meghan Ferrer MD, Zandra Sheppard MD, Patrick Ramirez MD, Dequan Morales MD, Alyssia Velez MD, Jas Parish DO, Leesa Gant MD,  Tayo Mejias DO, Tito Rosales MD, Kyle Diehl MD, Zachery Hernandez DO, Deven Soliman MD, Marie Bae MD, Bertha Tapia DO, Rk Nguyen MD, Tima Garcia MD, Chucho [facility], [facility], [facility], Amaliaronel Garcia, CNP, Claude Leep, CNP, Nino Tello, CNP, Ke Zhang, CNP, Dale Rogers PA-C, Boone Betts, [facility], Nathen Hernandez CNP, Madan Mckay, CNP, Jessica Christiansen, CNP, Artem Garcia, CNS, Amairani Perkins, [facility], Ambrosio Brian, CNP, Rohan Mcqueen, CNP, Hiro Mortensen, [facility]    HISTORY AND PHYSICAL EXAMINATION            Date:   6/17/2022  Patient name:  David Bynum  Date of admission:  6/17/2022  7:17 AM  MRN:   [id number]  Account:  [de-identified]  YOB: 1965  PCP:    Atha Bumpers, APRN - CNP  Room:   [facility]6/1026-01  Code Status:    Full Code    Chief Complaint:     Chief Complaint   Patient presents with    Chest Pain     for past week. worse this morning        History Obtained From:     patient    History of Present Illness:     David Bynum is a 61 y.o. Non- / non  male who presents with Chest Pain (for past week. worse this morning )   and is admitted to the hospital for the management of NSTEMI (non-ST elevated myocardial infarction) ([facility] [address] Patient reports that over the past 5 to 7 days he has had intermittent heartburn. Patient reports that he is never had GERD before so this was a highly abnormal finding for him. At 4 AM today the patient was awoken from sleep with severe burning sensation in the center of his chest.  He reported that it was rated at a 5/10 and did not radiate. He did not break out into a sweat and he had no radiation to his extremities or jaw. Patient did however have a sense of impending doom and reported to the emergency department immediately. In the emergency department patient underwent evaluation was found to have nonspecific EKG changes and initial troponins were normal.  Repeat troponins were 3.5 times the initial value. Interview with the patient reveals that he has a strong family history of heart disease including high cholesterol and hypertension in both his parents and his father had an MI at a young age requiring CABG. Sudden cardiac arrest at a young age is also present in his grandfather on his paternal side. Repeat EKG does not show significant ST segment elevation however there are mild changes from preliminary we will request cardiology to evaluate further. Past Medical History:     Past Medical History:   Diagnosis Date    DM (diabetes mellitus) ([facility] [address]     Hyperlipidemia         Past Surgical History:     Past Surgical History:   Procedure Laterality Date    COLONOSCOPY          Medications Prior to Admission:     Prior to Admission medications    Medication Sig Start Date End Date Taking? Authorizing Provider   metFORMIN (GLUCOPHAGE-XR) 500 mg extended release tablet Take 4 tablets by mouth daily (with breakfast) 6/7/22 9/5/22  CLARY Tristan CNP   atorvastatin (LIPITOR) 20 MG tablet TAKE ONE TABLET BY MOUTH DAILY 6/7/22   CLARY Tristan CNP   glucose monitoring kit (FREESTYLE) monitoring kit 1 kit by Does not apply route daily 1/29/20   Jimi Subramanian, DO   blood glucose test strips (ACCU-CHEK COMPACT TEST DRUM) strip 1 each by In Vitro route daily As needed.  1/29/20   Jimi Seamanal, DO   Lancets MISC 1 each by Does not apply route daily 1/29/20   Jimi Subramanian, DO   Respiratory Therapy Supplies KEILY 1 Device by Does not apply route nightly NASAL CPAP per recommendations of sleep medicine physician Dx : severe sleep apnea 19   Jimi Subramanian DO        Allergies:     Patient has no known allergies. Social History:     Tobacco:    reports that he has never smoked. He has never used smokeless tobacco.  Alcohol:      reports current alcohol use of about 2.0 standard drinks of alcohol per week. Drug Use:  reports no history of drug use. Family History:     Family History   Problem Relation Age of Onset    High Cholesterol Mother     Diabetes Mother     Heart Disease Father        Review of Systems:     Positive and Negative as described in HPI. Review of Systems   Constitutional: Positive for fatigue. Negative for activity change, chills and fever. HENT: Negative for sinus pressure and sinus pain. Eyes: Negative for photophobia and visual disturbance. Respiratory: Positive for shortness of breath. Negative for cough and wheezing. Cardiovascular: Positive for chest pain. Negative for palpitations and leg swelling. Gastrointestinal: Negative for abdominal pain, constipation, diarrhea, nausea and vomiting. Endocrine: Negative for cold intolerance, heat intolerance and polyuria. Genitourinary: Negative for difficulty urinating and urgency. Musculoskeletal: Negative for arthralgias and myalgias. Skin: Negative. Negative for wound. Neurological: Negative for dizziness, syncope, weakness and light-headedness. Hematological: Negative for adenopathy. Does not bruise/bleed easily. Psychiatric/Behavioral: Negative for agitation and confusion. The patient is not nervous/anxious. Physical Exam:   BP (!) 152/97   Pulse 72   Temp 98.5 °F (36.9 °C) (Oral)   Resp 18   Ht 5' 8\" (1.727 m)   Wt 210 lb (95.3 kg)   SpO2 96%   BMI 31.93 kg/m²   Temp (24hrs), Av.5 °F (36.9 °C), Min:98.5 °F (36.9 °C), Max:98.5 °F (36.9 °C)    No results for input(s): POCGLU in the last 72 hours.   No intake or output data in the 24 hours ending 22 1141    Physical Exam  Constitutional:       General: He is not in acute distress. Appearance: Normal appearance. He is normal weight. He is not toxic-appearing. HENT:      Head: Normocephalic and atraumatic. Mouth/Throat:      Mouth: Mucous membranes are moist.   Eyes:      Extraocular Movements: Extraocular movements intact. Pupils: Pupils are equal, round, and reactive to light. Cardiovascular:      Rate and Rhythm: Normal rate and regular rhythm. Pulses: Normal pulses. Heart sounds: Normal heart sounds. No murmur heard. Pulmonary:      Effort: Pulmonary effort is normal. No respiratory distress. Abdominal:      General: Abdomen is flat. Bowel sounds are normal. There is no distension. Palpations: Abdomen is soft. Tenderness: There is no abdominal tenderness. Musculoskeletal:         General: No swelling or tenderness. Normal range of motion. Cervical back: Normal range of motion and neck supple. Skin:     General: Skin is warm and dry. Capillary Refill: Capillary refill takes less than 2 seconds. Coloration: Skin is not pale. Neurological:      General: No focal deficit present. Mental Status: He is alert and oriented to person, place, and time. Mental status is at baseline. Psychiatric:         Mood and Affect: Mood normal.         Behavior: Behavior normal.         Thought Content:  Thought content normal.         Judgment: Judgment normal.         Investigations:      Laboratory Testing:  Recent Results (from the past 24 hour(s))   EKG 12 Lead    Collection Time: 06/17/22  7:03 AM   Result Value Ref Range    Ventricular Rate 77 BPM    Atrial Rate 77 BPM    P-R Interval 150 ms    QRS Duration 96 ms    Q-T Interval 364 ms    QTc Calculation (Bazett) 411 ms    P Axis 55 degrees    R Axis 46 degrees    T Axis 42 degrees   CBC with Auto Differential    Collection Time: 06/17/22  7:27 AM   Result Value Ref Range    WBC 7.7 3.5 - 11.3 k/uL    RBC 5.23 4.21 - 5.77 m/uL    Hemoglobin 15.7 13.0 - 17.0 g/dL Hematocrit 46.4 40.7 - 50.3 %    MCV 88.7 82.6 - 102.9 fL    MCH 30.0 25.2 - 33.5 pg    MCHC 33.8 28.4 - 34.8 g/dL    RDW 12.0 11.8 - 14.4 %    Platelets 134 196 - 100 k/uL    MPV 10.9 8.1 - 13.5 fL    NRBC Automated 0.0 0.0 per 100 WBC    Seg Neutrophils 63 36 - 65 %    Lymphocytes 25 24 - 43 %    Monocytes 9 3 - 12 %    Eosinophils % 1 1 - 4 %    Basophils 1 0 - 2 %    Immature Granulocytes 1 (H) 0 %    Segs Absolute 4.84 1.50 - 8.10 k/uL    Absolute Lymph # 1.94 1.10 - 3.70 k/uL    Absolute Mono # 0.68 0.10 - 1.20 k/uL    Absolute Eos # 0.10 0.00 - 0.44 k/uL    Basophils Absolute 0.06 0.00 - 0.20 k/uL    Absolute Immature Granulocyte 0.04 0.00 - 0.30 k/uL   Basic Metabolic Panel    Collection Time: 06/17/22  7:27 AM   Result Value Ref Range    Glucose 204 (H) 70 - 99 mg/dL    BUN 13 6 - 20 mg/dL    CREATININE 0.84 0.70 - 1.20 mg/dL    Bun/Cre Ratio 15 9 - 20    Calcium 9.3 8.6 - 10.4 mg/dL    Sodium 142 135 - 144 mmol/L    Potassium 4.2 3.7 - 5.3 mmol/L    Chloride 103 98 - 107 mmol/L    CO2 25 20 - 31 mmol/L    Anion Gap 14 9 - 17 mmol/L    GFR Non-African American >60 >60 mL/min    GFR African American >60 >60 mL/min    GFR Comment         Troponin    Collection Time: 06/17/22  7:27 AM   Result Value Ref Range    Troponin, High Sensitivity 29 (H) 0 - 22 ng/L   Brain Natriuretic Peptide    Collection Time: 06/17/22  7:27 AM   Result Value Ref Range    Pro-BNP 99 <300 pg/mL   Protime-INR    Collection Time: 06/17/22  7:27 AM   Result Value Ref Range    Protime 12.6 11.5 - 14.2 sec    INR 0.9    APTT    Collection Time: 06/17/22  7:27 AM   Result Value Ref Range    PTT 24.8 23.9 - 33.8 sec   Troponin    Collection Time: 06/17/22  9:22 AM   Result Value Ref Range    Troponin, High Sensitivity 101 (HH) 0 - 22 ng/L   EKG 12 Lead    Collection Time: 06/17/22 10:06 AM   Result Value Ref Range    Ventricular Rate 80 BPM    Atrial Rate 80 BPM    P-R Interval 154 ms    QRS Duration 94 ms    Q-T Interval 380 ms    QTc Calculation (Narciso) 438 ms    P Axis 59 degrees    R Axis 47 degrees    T Axis 54 degrees       Imaging/Diagnostics:  XR CHEST 1 VIEW    Result Date: 6/17/2022  Low lung volumes with no acute process identified. Assessment :      Hospital Problems           Last Modified POA    * (Principal) NSTEMI (non-ST elevated myocardial infarction) ([facility] [address] 6/17/2022 Yes    Diabetes mellitus without complication ([facility] [address] 3/52/4086 Yes          Plan:     Patient status inpatient in the  Progressive Unit/Step down    1. Non-STEMI with chest pain  1. Continue heparin drip as initiated by ER  2. Maintain n.p.o. status and notify cardiology  3. Daily aspirin, as needed morphine and nitro  4. Continue statin  2. Type 2 diabetes without complication  1. Continue metformin    Consultations:   IP CONSULT TO HOSPITALIST  IP CONSULT TO CARDIOLOGY    Patient is admitted as inpatient status because of co-morbidities listed above, severity of signs and symptoms as outlined, requirement for current medical therapies and most importantly because of direct risk to patient if care not provided in a hospital setting. Expected length of stay > 48 hours.     CLARY Bolanos NP  6/17/2022  11:41 AM    Copy sent to Dr. Qi Foley, APRN - CNP

## 2022-06-17 NOTE — PROGRESS NOTES
Pt admitted to room 2032 from cath lab via bed with PACU RN. Verbal report received from RN. Telemetry monitor applied. VS as charted. No s/s of distress at this time. Pt oriented to unit, room, call light and bed controls. Verbal safety instructions provided- pt verbalized understanding. Informed pt of plan of care. Pt denies needs. Will monitor pt.

## 2022-06-18 LAB
ALBUMIN SERPL-MCNC: 4.3 G/DL (ref 3.5–5.2)
ALP BLD-CCNC: 74 U/L (ref 40–129)
ALT SERPL-CCNC: 29 U/L (ref 5–41)
ANION GAP SERPL CALCULATED.3IONS-SCNC: 10 MMOL/L (ref 9–17)
ANTI-XA UNFRAC HEPARIN: <0.1 IU/L (ref 0.3–0.7)
AST SERPL-CCNC: 52 U/L
BILIRUB SERPL-MCNC: 0.84 MG/DL (ref 0.3–1.2)
BUN BLDV-MCNC: 9 MG/DL (ref 6–20)
BUN/CREAT BLD: 11 (ref 9–20)
CALCIUM SERPL-MCNC: 8.9 MG/DL (ref 8.6–10.4)
CHLORIDE BLD-SCNC: 103 MMOL/L (ref 98–107)
CHOLESTEROL/HDL RATIO: 4.3
CHOLESTEROL: 152 MG/DL
CO2: 25 MMOL/L (ref 20–31)
CREAT SERPL-MCNC: 0.81 MG/DL (ref 0.7–1.2)
GFR AFRICAN AMERICAN: >60 ML/MIN
GFR NON-AFRICAN AMERICAN: >60 ML/MIN
GFR SERPL CREATININE-BSD FRML MDRD: ABNORMAL ML/MIN/{1.73_M2}
GLUCOSE BLD-MCNC: 158 MG/DL (ref 75–110)
GLUCOSE BLD-MCNC: 159 MG/DL (ref 75–110)
GLUCOSE BLD-MCNC: 175 MG/DL (ref 70–99)
GLUCOSE BLD-MCNC: 188 MG/DL (ref 75–110)
HCT VFR BLD CALC: 42.9 % (ref 40.7–50.3)
HDLC SERPL-MCNC: 35 MG/DL
HEMOGLOBIN: 14.6 G/DL (ref 13–17)
LDL CHOLESTEROL: 78 MG/DL (ref 0–130)
MAGNESIUM: 2 MG/DL (ref 1.6–2.6)
MCH RBC QN AUTO: 29.9 PG (ref 25.2–33.5)
MCHC RBC AUTO-ENTMCNC: 34 G/DL (ref 28.4–34.8)
MCV RBC AUTO: 87.7 FL (ref 82.6–102.9)
NRBC AUTOMATED: 0 PER 100 WBC
PDW BLD-RTO: 12.1 % (ref 11.8–14.4)
PLATELET # BLD: 186 K/UL (ref 138–453)
PMV BLD AUTO: 10.6 FL (ref 8.1–13.5)
POTASSIUM SERPL-SCNC: 3.7 MMOL/L (ref 3.7–5.3)
RBC # BLD: 4.89 M/UL (ref 4.21–5.77)
SODIUM BLD-SCNC: 138 MMOL/L (ref 135–144)
TOTAL PROTEIN: 6.4 G/DL (ref 6.4–8.3)
TRIGL SERPL-MCNC: 193 MG/DL
WBC # BLD: 9.7 K/UL (ref 3.5–11.3)

## 2022-06-18 PROCEDURE — APPSS45 APP SPLIT SHARED TIME 31-45 MINUTES: Performed by: NURSE PRACTITIONER

## 2022-06-18 PROCEDURE — A4216 STERILE WATER/SALINE, 10 ML: HCPCS | Performed by: NURSE PRACTITIONER

## 2022-06-18 PROCEDURE — 6370000000 HC RX 637 (ALT 250 FOR IP): Performed by: STUDENT IN AN ORGANIZED HEALTH CARE EDUCATION/TRAINING PROGRAM

## 2022-06-18 PROCEDURE — 2580000003 HC RX 258: Performed by: INTERNAL MEDICINE

## 2022-06-18 PROCEDURE — 82947 ASSAY GLUCOSE BLOOD QUANT: CPT

## 2022-06-18 PROCEDURE — 83735 ASSAY OF MAGNESIUM: CPT

## 2022-06-18 PROCEDURE — 80053 COMPREHEN METABOLIC PANEL: CPT

## 2022-06-18 PROCEDURE — 93005 ELECTROCARDIOGRAM TRACING: CPT | Performed by: NURSE PRACTITIONER

## 2022-06-18 PROCEDURE — 2060000000 HC ICU INTERMEDIATE R&B

## 2022-06-18 PROCEDURE — 2500000003 HC RX 250 WO HCPCS: Performed by: NURSE PRACTITIONER

## 2022-06-18 PROCEDURE — 2580000003 HC RX 258: Performed by: NURSE PRACTITIONER

## 2022-06-18 PROCEDURE — 6370000000 HC RX 637 (ALT 250 FOR IP): Performed by: NURSE PRACTITIONER

## 2022-06-18 PROCEDURE — 94761 N-INVAS EAR/PLS OXIMETRY MLT: CPT

## 2022-06-18 PROCEDURE — 85027 COMPLETE CBC AUTOMATED: CPT

## 2022-06-18 PROCEDURE — 80061 LIPID PANEL: CPT

## 2022-06-18 PROCEDURE — 85520 HEPARIN ASSAY: CPT

## 2022-06-18 PROCEDURE — 36415 COLL VENOUS BLD VENIPUNCTURE: CPT

## 2022-06-18 PROCEDURE — 6370000000 HC RX 637 (ALT 250 FOR IP): Performed by: INTERNAL MEDICINE

## 2022-06-18 PROCEDURE — 99232 SBSQ HOSP IP/OBS MODERATE 35: CPT | Performed by: NURSE PRACTITIONER

## 2022-06-18 RX ORDER — CARVEDILOL 6.25 MG/1
6.25 TABLET ORAL 2 TIMES DAILY WITH MEALS
Qty: 60 TABLET | Refills: 3 | Status: SHIPPED | OUTPATIENT
Start: 2022-06-18 | End: 2022-06-23 | Stop reason: SDUPTHER

## 2022-06-18 RX ORDER — FAMOTIDINE 20 MG/1
20 TABLET, FILM COATED ORAL 2 TIMES DAILY
Status: DISCONTINUED | OUTPATIENT
Start: 2022-06-18 | End: 2022-06-19 | Stop reason: HOSPADM

## 2022-06-18 RX ORDER — INSULIN LISPRO 100 [IU]/ML
0-12 INJECTION, SOLUTION INTRAVENOUS; SUBCUTANEOUS
Status: DISCONTINUED | OUTPATIENT
Start: 2022-06-18 | End: 2022-06-19 | Stop reason: HOSPADM

## 2022-06-18 RX ORDER — DEXTROSE MONOHYDRATE 50 MG/ML
100 INJECTION, SOLUTION INTRAVENOUS PRN
Status: DISCONTINUED | OUTPATIENT
Start: 2022-06-18 | End: 2022-06-19 | Stop reason: HOSPADM

## 2022-06-18 RX ORDER — ATORVASTATIN CALCIUM 40 MG/1
40 TABLET, FILM COATED ORAL NIGHTLY
Qty: 30 TABLET | Refills: 3 | Status: SHIPPED | OUTPATIENT
Start: 2022-06-18 | End: 2022-06-23 | Stop reason: SDUPTHER

## 2022-06-18 RX ORDER — ASPIRIN 81 MG/1
81 TABLET ORAL DAILY
Qty: 30 TABLET | Refills: 3 | Status: SHIPPED | OUTPATIENT
Start: 2022-06-18 | End: 2022-10-24 | Stop reason: SDUPTHER

## 2022-06-18 RX ORDER — INSULIN LISPRO 100 [IU]/ML
0-6 INJECTION, SOLUTION INTRAVENOUS; SUBCUTANEOUS NIGHTLY
Status: DISCONTINUED | OUTPATIENT
Start: 2022-06-18 | End: 2022-06-19 | Stop reason: HOSPADM

## 2022-06-18 RX ORDER — NITROGLYCERIN 0.4 MG/1
TABLET SUBLINGUAL
Qty: 25 TABLET | Refills: 3 | Status: SHIPPED | OUTPATIENT
Start: 2022-06-18

## 2022-06-18 RX ADMIN — INSULIN LISPRO 2 UNITS: 100 INJECTION, SOLUTION INTRAVENOUS; SUBCUTANEOUS at 16:41

## 2022-06-18 RX ADMIN — INSULIN LISPRO 1 UNITS: 100 INJECTION, SOLUTION INTRAVENOUS; SUBCUTANEOUS at 21:38

## 2022-06-18 RX ADMIN — ATORVASTATIN CALCIUM 40 MG: 40 TABLET, FILM COATED ORAL at 21:41

## 2022-06-18 RX ADMIN — SODIUM CHLORIDE, PRESERVATIVE FREE 20 MG: 5 INJECTION INTRAVENOUS at 10:11

## 2022-06-18 RX ADMIN — TICAGRELOR 90 MG: 90 TABLET ORAL at 21:41

## 2022-06-18 RX ADMIN — SODIUM CHLORIDE, PRESERVATIVE FREE 10 ML: 5 INJECTION INTRAVENOUS at 21:00

## 2022-06-18 RX ADMIN — CARVEDILOL 6.25 MG: 6.25 TABLET, FILM COATED ORAL at 16:41

## 2022-06-18 RX ADMIN — TICAGRELOR 90 MG: 90 TABLET ORAL at 10:11

## 2022-06-18 RX ADMIN — ACETAMINOPHEN 325MG 650 MG: 325 TABLET ORAL at 21:40

## 2022-06-18 RX ADMIN — ASPIRIN 81 MG: 81 TABLET, COATED ORAL at 10:11

## 2022-06-18 RX ADMIN — FAMOTIDINE 20 MG: 20 TABLET, FILM COATED ORAL at 21:41

## 2022-06-18 RX ADMIN — CARVEDILOL 6.25 MG: 6.25 TABLET, FILM COATED ORAL at 10:11

## 2022-06-18 ASSESSMENT — PAIN DESCRIPTION - LOCATION: LOCATION: HEAD

## 2022-06-18 ASSESSMENT — PAIN SCALES - GENERAL: PAINLEVEL_OUTOF10: 2

## 2022-06-18 ASSESSMENT — PAIN DESCRIPTION - ORIENTATION: ORIENTATION: RIGHT;LEFT

## 2022-06-18 ASSESSMENT — PAIN DESCRIPTION - DESCRIPTORS: DESCRIPTORS: ACHING;DISCOMFORT

## 2022-06-18 ASSESSMENT — PAIN - FUNCTIONAL ASSESSMENT: PAIN_FUNCTIONAL_ASSESSMENT: ACTIVITIES ARE NOT PREVENTED

## 2022-06-18 NOTE — CARE COORDINATION
Case Management Initial Discharge Plan  Harry Simmons,             Met with:patient to discuss discharge plans. Information verified: address, contacts, phone number, , insurance Yes  PCP: CLARY Martin CNP  Date of last visit: 2022    Insurance Provider: Dionne Sumner    Discharge Planning    Living Arrangements:  Alone   Support Systems:  [address] [facility] [facility]    Home has 1 stories  0 stairs to climb to get into front door, 0 stairs to climb to reach second floor  Location of bedroom/bathroom in home  - main floor    Patient able to perform ADL's:Independent    Current Services (outpatient & in home) none  DME equipment: CPAP, glucometer (doesn't use either)  DME provider: [facility] [address]: [facility] in [place]    Potential Assistance Needed:  Anticoagulation, cardiac rehab    Patient agreeable to home care: No  [place] of choice provided:  n/a    Prior SNF/Rehab Placement and Facility: No  Agreeable to SNF/Rehab: No  [place] of choice provided: n/a   Evaluation: n/a    Expected Discharge date:   22  Patient expects to be discharged to:   home  Follow Up Appointment: Best Day/ Time: Monday AM    Transportation provider: drove self  Transportation arrangements needed for discharge: No    Readmission Risk              Risk of Unplanned Readmission:  11             Does patient have a readmission risk score greater than 14?: No  If yes, follow-up appointment must be made within 7 days of discharge. Goal of Care:       Discharge Plan: Met with patient at bedside. Lives alone, independent and works full time. Admitted for NSTEMI and had cardiac cath with 2 stents yesterday with Dr. Cristel Quijano. Denies [facility] and follow for [facility] therapy and cardiac rehab. Potential D/C home .                 Electronically signed by Chino Zhang RN on 22 at 4:39 PM EDT

## 2022-06-18 NOTE — FLOWSHEET NOTE
[facility] NOTE    Room # 2032/2032-01   Name: Harry Simmons            Gnosticist: non-Temple     Reason for visit: Routine    I visited the patient. Admit Date & Time: 6/17/2022  7:17 AM    Assessment:  Harry Simmons is a 61 y.o. male in the hospital because nstemi. Upon entering the room pt was sitting up in bed      Intervention:  I introduced myself and my title as  I offered space for pt  to express feelings, needs, and concerns and provided a ministry presence. Outcome:  Pt expressed gratitude for visit    Plan:  Chaplains will remain available to offer spiritual and emotional support as needed. Electronically signed by Henok Coburn on 6/18/2022 at 11:10 AM.  Zainab         06/18/22 1108   Encounter Summary   Encounter Overview/Reason  Initial Encounter   Service Provided For: Patient   Referral/Consult From: [facility]   Support System Spouse; Children   Last Encounter  06/18/22   Complexity of Encounter Low   Begin Time 1025   End Time  1030   Total Time Calculated 5 min   Encounter    Type Initial Screen/Assessment   Assessment/Intervention/Outcome   Assessment Calm;Coping   Intervention Active listening;Sustaining Presence/Ministry of presence   Outcome Engaged in conversation;Expressed Gratitude

## 2022-06-18 NOTE — PROGRESS NOTES
Patient ambulated around unit. HR was in the low 100s to one teens upon exertion. Patient denies SOB and chest pain. Continue to monitor.

## 2022-06-18 NOTE — PROGRESS NOTES
Section of Cardiology  Progress Note      Date:  6/18/2022  Patient: Farhad Kramer  Admission:  6/17/2022  7:17 AM  Admit DX: NSTEMI (non-ST elevated myocardial infarction) [facility]) [I21.4]  Chest pain, unspecified type [R07.9]  Age:  61 y.o., 1965     LOS: 1 day     Reason for evaluation:   previous M. I.      SUBJECTIVE:     The patient was seen and examined. Notes and labs reviewed. There were not complications over night. Patient seen and examined in his room with his nurse at bedside. Free of chest pain. Ambulated inside the room only however I noticed that any small activities makes his heart rate go up above 100. Patient's cardiac review of systems: negative for chest pain and dyspnea. The patient is generally feeling gradually improving. OBJECTIVE:    Telemetry: Sinus  /83   Pulse 75   Temp 97.6 °F (36.4 °C) (Temporal)   Resp 16   Ht 5' 8\" (1.727 m)   Wt 210 lb 1 oz (95.3 kg)   SpO2 95%   BMI 31.94 kg/m²     Intake/Output Summary (Last 24 hours) at 6/18/2022 1008  Last data filed at 6/17/2022 2043  Gross per 24 hour   Intake 300 ml   Output --   Net 300 ml       EXAM:   CONSTITUTIONAL:  awake, alert, cooperative, no apparent distress, and appears stated age. HEENT: Normal jugular venous pulsations, no carotid bruits. Head is atraumatic, normocephalic. Eyes and oral mucosa are normal.  LUNGS: Good respiratory effort. No for increased work of breathing. On auscultation: clear to auscultation bilaterally  CARDIOVASCULAR:  Normal apical impulse, regular rate and rhythm, normal S1 and S2, no S3 or S4,   ABDOMEN: Soft, nontender, nondistended. Bowel sounds present. SKIN: Warm and dry. EXTREMITIES: No lower extremities edema. Motor movement grossly intact. No cyanosis or clubbing.   The right wrist appears to be clean, good radial pulse and good finger strength and color    Current Inpatient Medications:   [Held by provider] metFORMIN  2,000 mg Oral Daily with breakfast    sodium chloride flush  5-40 mL IntraVENous 2 times per day    atorvastatin  40 mg Oral Nightly    famotidine (PEPCID) injection  20 mg IntraVENous BID    sodium chloride flush  5-40 mL IntraVENous 2 times per day    aspirin  81 mg Oral Daily    ticagrelor  90 mg Oral BID    carvedilol  6.25 mg Oral BID WC       IV Infusions (if any):   sodium chloride 100 mL/hr at 06/17/22 1039    sodium chloride      heparin (PORCINE) Infusion 10.5 Units/kg/hr (06/17/22 1214)    sodium chloride         Diagnostics:   EKG: . ECHO: .   Ejection fraction: %  Stress Test: not obtained. Cardiac Angiography: reviewed. Labs:   CBC:   Recent Labs     06/17/22  1221 06/18/22  0427   WBC 9.3 9.7   HGB 15.1 14.6   HCT 44.7 42.9    186     BMP:   Recent Labs     06/17/22  0727 06/18/22  0427    138   K 4.2 3.7   CO2 25 25   BUN 13 9   CREATININE 0.84 0.81   LABGLOM >60 >60   GLUCOSE 204* 175*     No results found for: BNP  PT/INR:   Recent Labs     06/17/22  0727 06/17/22  1221   PROTIME 12.6 13.5   INR 0.9 1.0     APTT:  Recent Labs     06/17/22  0727 06/17/22  1221   APTT 24.8 28.0     CARDIAC ENZYMES:No results for input(s): CKTOTAL, CKMB, CKMBINDEX, TROPONINT in the last 72 hours. FASTING LIPID PANEL:  Lab Results   Component Value Date    HDL 35 06/18/2022    [address] 123 03/01/2019    TRIG 193 06/18/2022     LIVER PROFILE:  Recent Labs     06/18/22  0427   AST 52*   ALT 29   LABALBU 4.3       ASSESSMENT:  · Non-STEMI, currently free of angina  · Multivessel CAD  · Status post PCI to the LAD, very long lesion  · Diabetes for 2 years  · Hyperlipidemia, treated  · Family history of CAD, both parents had bypass surgery in their 60-70's    Patient Active Problem List   Diagnosis    Diabetes mellitus without complication ([facility] [address]    NSTEMI (non-ST elevated myocardial infarction) ([facility] [address]       PLAN:    1. Recommend to keep in the hospital 1 more day  2. Ambulate outside the room  3.  Monitor heart rate and if needed will increase Coreg  4. Patient has small arteries and long lesions in his coronaries  5. Cardiac rehab      Please see orders. Discussed with patient and other hospitalist nurse practitioner and nursing.     Victoria Diaz MD, MD

## 2022-06-18 NOTE — PLAN OF CARE
Problem: Safety - Adult  Goal: Free from fall injury  Flowsheets (Taken 6/18/2022 9266)  Free From Fall Injury: Instruct family/caregiver on patient safety  Note: Patient remained free of falls/injury during shift. Call light and bedside table within reach. Bed in lowest position. Bed breaks locked. 2/4 side rails up. Nonskid socks on. Patient calls out appropriately. Continue to monitor.

## 2022-06-18 NOTE — PROGRESS NOTES
[facility]  Office: [address], DO, Ranjeet Ureñasar, DO, Moises Narrow, DO, Toshia Toro Blood, DO, Soledad Cook MD, Nimesh Negro MD, Frank Yanez MD, Vero Herron MD, Wayne Cordoba MD, Riky Jo MD, Bridgette Beavers MD, Dominic Basilio, DO, Elroy Gonzalez MD,  Jenny Quiroga, DO, Pieter Hylton MD, Mark Anthony Mendieta MD, Reji Brizuela, DO, Indu Francis MD, Pema Pimentel MD, Raisa Ortiz, DO, Leobardo Holland MD, Kelsi Pichardo MD, Almas Guillaume, [facility], [facility], CNP, Radha Mckeon, CNP, Toro Mcgowan, CNP, Alan Hill, CNP, Lo Fernandez, CNP, RENÉE MartinesC, Janet Nova, DNP, Becky Braxton, CNP, Ayesha Dye, CNP, Lilian Villa, CNP, Laura Councilman, CNS, Annette Child, DNP, Tony Dey, CNP, Wilder Watkins, CNP, Bibi Robbins, [facility]    Progress Note    6/18/2022    12:08 PM    Name:   Dania Almeida  MRN:     [id number]     Acct:      [de-identified]   Room:   2032/2032-01   Day:  1  Admit Date:  6/17/2022  7:17 AM    PCP:   CLARY Martin CNP  Code Status:  Full Code    Subjective:     C/C:   Chief Complaint   Patient presents with    Chest Pain     for past week. worse this morning    Interval History Status: significantly improved. Significant improvement in condition overnight. Patient received heart catheterization in the afternoon yesterday and stents were placed. See cardiology operative note for further details. Patient is asymptomatic and highly motivated for discharge. It is explained to the patient with the location of the lesion and the initiation of a new medication regiment and is in bed so stressed to remain in the hospital for 1 additional night for observation. Patient expressed understanding. Case is discussed with cardiology and they are pleased with his medical progress thus far.   Diabetic medications are being held due to the use of contrast and he has been hyperglycemic. We will initiate insulin as an inpatient however this will not likely be required as an outpatient. Brief History:     6/17 - Patient reports that over the past 5 to 7 days he has had intermittent heartburn. Patient reports that he is never had GERD before so this was a highly abnormal finding for him. At 4 AM today the patient was awoken from sleep with severe burning sensation in the center of his chest.  He reported that it was rated at a 5/10 and did not radiate. Patient did however have a sense of impending doom and reported to the emergency department immediately. In the emergency department patient underwent evaluation was found to have nonspecific EKG changes and initial troponins were normal.  Repeat troponins were 3.5 times the initial value. Strong family history of heart disease. 6/18 -heart stents placed yesterday by cardiology. Patient has no symptoms today and is stable. Cardiology recommends 24 additional hours of observation and patient can likely be discharged tomorrow. Review of Systems:     Constitutional:  negative for chills, fevers, sweats  Respiratory:  negative for cough, dyspnea on exertion, shortness of breath, wheezing  Cardiovascular:  negative for chest pain, chest pressure/discomfort, lower extremity edema, palpitations  Gastrointestinal:  negative for abdominal pain, constipation, diarrhea, nausea, vomiting  Neurological:  negative for dizziness, headache    Medications:      Allergies:  No Known Allergies    Current Meds:   Scheduled Meds:    [Held by provider] metFORMIN  2,000 mg Oral Daily with breakfast    sodium chloride flush  5-40 mL IntraVENous 2 times per day    atorvastatin  40 mg Oral Nightly    famotidine (PEPCID) injection  20 mg IntraVENous BID    sodium chloride flush  5-40 mL IntraVENous 2 times per day    aspirin  81 mg Oral Daily    ticagrelor  90 mg Oral BID    carvedilol  6.25 mg Oral BID      Continuous Infusions:    sodium chloride 100 mL/hr at 22 1039    sodium chloride      heparin (PORCINE) Infusion 10.5 Units/kg/hr (22 1214)    sodium chloride       PRN Meds: sodium chloride flush, sodium chloride, ondansetron **OR** [DISCONTINUED] ondansetron, [DISCONTINUED] acetaminophen **OR** acetaminophen, magnesium hydroxide, potassium chloride **OR** potassium alternative oral replacement **OR** potassium chloride, potassium chloride, magnesium sulfate, nitroGLYCERIN, perflutren lipid microspheres, heparin (porcine), heparin (porcine), sodium chloride flush, sodium chloride, acetaminophen, ondansetron    Data:     Past Medical History:   has a past medical history of DM (diabetes mellitus) ([address] and Hyperlipidemia. Social History:   reports that he has never smoked. He has never used smokeless tobacco. He reports current alcohol use of about 2.0 standard drinks of alcohol per week. He reports that he does not use drugs. Family History:   Family History   Problem Relation Age of Onset    High Cholesterol Mother     Diabetes Mother     Heart Disease Father        Vitals:  /83   Pulse 91   Temp 97.6 °F (36.4 °C) (Temporal)   Resp 16   Ht 5' 8\" (1.727 m)   Wt 210 lb 1 oz (95.3 kg)   SpO2 95%   BMI 31.94 kg/m²   Temp (24hrs), Av.7 °F (37.1 °C), Min:97.6 °F (36.4 °C), Max:99.5 °F (37.5 °C)    No results for input(s): POCGLU in the last 72 hours. I/O (24Hr):     Intake/Output Summary (Last 24 hours) at 2022 1208  Last data filed at 2022 2043  Gross per 24 hour   Intake 300 ml   Output --   Net 300 ml       Labs:  Hematology:  Recent Labs     22  0727 22  1221 22  0427   WBC 7.7 9.3 9.7   RBC 5.23 5.04 4.89   HGB 15.7 15.1 14.6   HCT 46.4 44.7 42.9   MCV 88.7 88.7 87.7   MCH 30.0 30.0 29.9   MCHC 33.8 33.8 34.0   RDW 12.0 12.1 12.1    208 186   MPV 10.9 10.8 10.6   INR 0.9 1.0  --      Chemistry:  Recent Labs     22  0727 22  8538 06/17/22  1221 06/18/22  0427     --   --  138   K 4.2  --   --  3.7     --   --  103   CO2 25  --   --  25   GLUCOSE 204*  --   --  175*   BUN 13  --   --  9   CREATININE 0.84  --   --  0.81   MG  --   --   --  2.0   ANIONGAP 14  --   --  10   LABGLOM >60  --   --  >60   GFRAA >60  --   --  >60   CALCIUM 9.3  --   --  8.9   PROBNP 99  --   --   --    TROPHS 29* 101* 283*  --      Recent Labs     06/18/22 0427   PROT 6.4   LABALBU 4.3   AST 52*   ALT 29   ALKPHOS 74   BILITOT 0.84   CHOL 152   HDL 35*   LDLCHOLESTEROL 78   CHOLHDLRATIO 4.3   TRIG 193*     ABG:No results found for: POCPH, PHART, PH, POCPCO2, AUO2UTV, PCO2, POCPO2, PO2ART, PO2, POCHCO3, EPA5PUG, HCO3, NBEA, PBEA, BEART, BE, THGBART, THB, FUN3HVU, ZTLX0BZX, U6SAVHYV, O2SAT, FIO2  No results found for: SPECIAL  No results found for: CULTURE    Radiology:  XR CHEST 1 VIEW    Result Date: 6/17/2022  Low lung volumes with no acute process identified. Physical Examination:        General appearance:  alert, cooperative and no distress  Mental Status:  oriented to person, place and time and normal affect  Lungs:  clear to auscultation bilaterally, normal effort  Heart:  regular rate and rhythm, no murmur  Abdomen:  soft, nontender, nondistended, normal bowel sounds, no masses, hepatomegaly, splenomegaly  Extremities:  no edema, redness, tenderness in the calves  Skin:  no gross lesions, rashes, induration    Assessment:        Hospital Problems           Last Modified POA    * (Principal) [facility] 6/17/2022 Yes    Diabetes mellitus without complication ([facility] 8/36/3243 Yes          Plan:        1. NSTEMI  1. Status post stent placement  2. Continue statin, beta-blocker, aspirin  3. Dual antiplatelet with aspirin and Brilinta per cardiology  2. Diabetes  1. Hold metformin due to the contrast use during cath  2.  Glycemic control with sliding scale insulin as an inpatient if required    Cullen Branham Cong Pacheco NP  6/18/2022  12:08 PM

## 2022-06-18 NOTE — PLAN OF CARE
Problem: Discharge Planning  Goal: Discharge to home or other facility with appropriate resources  Outcome: Progressing  Flowsheets (Taken 6/17/2022 1715 by Ramin Johnson, RN)  Discharge to home or other facility with appropriate resources: Identify barriers to discharge with patient and caregiver     Problem: Pain  Goal: Verbalizes/displays adequate comfort level or baseline comfort level  Outcome: Progressing     Problem: Safety - Adult  Goal: Free from fall injury  Outcome: Progressing     Problem: ABCDS Injury Assessment  Goal: Absence of physical injury  Outcome: Progressing     Problem: Chronic Conditions and Co-morbidities  Goal: Patient's chronic conditions and co-morbidity symptoms are monitored and maintained or improved  Outcome: Progressing  Flowsheets (Taken 6/17/2022 1715 by Ramin Johnson, RN)  Care Plan - Patient's Chronic Conditions and Co-Morbidity Symptoms are Monitored and Maintained or Improved: Monitor and assess patient's chronic conditions and comorbid symptoms for stability, deterioration, or improvement

## 2022-06-19 VITALS
WEIGHT: 210.06 LBS | TEMPERATURE: 97.5 F | SYSTOLIC BLOOD PRESSURE: 125 MMHG | BODY MASS INDEX: 31.84 KG/M2 | HEIGHT: 68 IN | RESPIRATION RATE: 16 BRPM | DIASTOLIC BLOOD PRESSURE: 88 MMHG | HEART RATE: 84 BPM | OXYGEN SATURATION: 91 %

## 2022-06-19 LAB
ANTI-XA UNFRAC HEPARIN: <0.1 IU/L (ref 0.3–0.7)
ANTI-XA UNFRAC HEPARIN: <0.1 IU/L (ref 0.3–0.7)
GLUCOSE BLD-MCNC: 164 MG/DL (ref 75–110)
GLUCOSE BLD-MCNC: 265 MG/DL (ref 75–110)
HCT VFR BLD CALC: 45.2 % (ref 40.7–50.3)
HEMOGLOBIN: 15.1 G/DL (ref 13–17)
MCH RBC QN AUTO: 29.3 PG (ref 25.2–33.5)
MCHC RBC AUTO-ENTMCNC: 33.4 G/DL (ref 28.4–34.8)
MCV RBC AUTO: 87.6 FL (ref 82.6–102.9)
NRBC AUTOMATED: 0 PER 100 WBC
PDW BLD-RTO: 11.9 % (ref 11.8–14.4)
PLATELET # BLD: 190 K/UL (ref 138–453)
PMV BLD AUTO: 10.6 FL (ref 8.1–13.5)
RBC # BLD: 5.16 M/UL (ref 4.21–5.77)
WBC # BLD: 8.5 K/UL (ref 3.5–11.3)

## 2022-06-19 PROCEDURE — 2580000003 HC RX 258: Performed by: NURSE PRACTITIONER

## 2022-06-19 PROCEDURE — 6370000000 HC RX 637 (ALT 250 FOR IP): Performed by: STUDENT IN AN ORGANIZED HEALTH CARE EDUCATION/TRAINING PROGRAM

## 2022-06-19 PROCEDURE — 94761 N-INVAS EAR/PLS OXIMETRY MLT: CPT

## 2022-06-19 PROCEDURE — 36415 COLL VENOUS BLD VENIPUNCTURE: CPT

## 2022-06-19 PROCEDURE — 6370000000 HC RX 637 (ALT 250 FOR IP): Performed by: NURSE PRACTITIONER

## 2022-06-19 PROCEDURE — 6370000000 HC RX 637 (ALT 250 FOR IP): Performed by: INTERNAL MEDICINE

## 2022-06-19 PROCEDURE — 85520 HEPARIN ASSAY: CPT

## 2022-06-19 PROCEDURE — 85027 COMPLETE CBC AUTOMATED: CPT

## 2022-06-19 PROCEDURE — 82947 ASSAY GLUCOSE BLOOD QUANT: CPT

## 2022-06-19 PROCEDURE — 99238 HOSP IP/OBS DSCHRG MGMT 30/<: CPT | Performed by: STUDENT IN AN ORGANIZED HEALTH CARE EDUCATION/TRAINING PROGRAM

## 2022-06-19 PROCEDURE — 2580000003 HC RX 258: Performed by: INTERNAL MEDICINE

## 2022-06-19 RX ORDER — LISINOPRIL 5 MG/1
5 TABLET ORAL DAILY
Qty: 90 TABLET | Refills: 1 | Status: SHIPPED | OUTPATIENT
Start: 2022-06-19 | End: 2022-06-23 | Stop reason: SDUPTHER

## 2022-06-19 RX ORDER — SPIRONOLACTONE 25 MG/1
12.5 TABLET ORAL DAILY
Qty: 45 TABLET | Refills: 1 | Status: SHIPPED | OUTPATIENT
Start: 2022-06-19 | End: 2022-06-23 | Stop reason: SDUPTHER

## 2022-06-19 RX ADMIN — SODIUM CHLORIDE, PRESERVATIVE FREE 10 ML: 5 INJECTION INTRAVENOUS at 08:32

## 2022-06-19 RX ADMIN — SODIUM CHLORIDE, PRESERVATIVE FREE 10 ML: 5 INJECTION INTRAVENOUS at 09:00

## 2022-06-19 RX ADMIN — TICAGRELOR 90 MG: 90 TABLET ORAL at 08:30

## 2022-06-19 RX ADMIN — FAMOTIDINE 20 MG: 20 TABLET, FILM COATED ORAL at 08:30

## 2022-06-19 RX ADMIN — INSULIN LISPRO 2 UNITS: 100 INJECTION, SOLUTION INTRAVENOUS; SUBCUTANEOUS at 08:32

## 2022-06-19 RX ADMIN — ASPIRIN 81 MG: 81 TABLET, COATED ORAL at 08:30

## 2022-06-19 RX ADMIN — INSULIN LISPRO 6 UNITS: 100 INJECTION, SOLUTION INTRAVENOUS; SUBCUTANEOUS at 12:41

## 2022-06-19 RX ADMIN — CARVEDILOL 6.25 MG: 6.25 TABLET, FILM COATED ORAL at 08:30

## 2022-06-19 ASSESSMENT — PAIN SCALES - GENERAL
PAINLEVEL_OUTOF10: 0
PAINLEVEL_OUTOF10: 0

## 2022-06-19 ASSESSMENT — PAIN SCALES - WONG BAKER
WONGBAKER_NUMERICALRESPONSE: 0

## 2022-06-19 NOTE — DISCHARGE SUMMARY
[facility]  Office: [address], DO, Marcel Copan, DO, Mckenzie Balint, DO, Justo Light Blood, DO, Kennedy Andujar MD, Nataly Wilkins MD, Maicol Boo MD, Jordan Tinsley MD, Brandie Alejandre MD, Sivan Esquivel MD, Lo Snyder MD, Marilee Fisher, DO, Kirsten Odell MD,  Ab Rush, DO, Florence Glez MD, Dusty Correa MD, Jeremy Dakins, DO, Merissa Anand MD, Collins Negro MD, Lamonte Cooks, , Lidia Acevedo MD, Michele Mahan MD, Mesfin Romero [facility], [facility], CNP, Marilee Desir, CNP, Emely Vasquez, CNP, Wayne Shannon, CNP, Rola Dupree, CNP, Kamilah Fortune PA-C, Paramjit Mackay [facility], Anderson Prescott, CNP, Christa Pollard, CNP, Sebastien Vargas, CNP, Cristino Cunningham, CNS, Marco Cueto, [facility], Elisabet Kaur, CNP, Lucretia Hutchinson, [facility], Jossie [facility], [facility]    Discharge Summary     Patient ID: Jayde Kaufman  :  1965   MRN: [id number]     ACCOUNT:  [de-identified]   Patient's PCP: CLARY Butts CNP  Admit Date: 2022   Discharge Date: 2022    Length of Stay: 2  Code Status:  Full Code  Admitting Physician: Collins Negro MD  Discharge Physician: Collins Negro MD     Active Discharge Diagnoses:     Hospital Problem Lists:  Principal Problem:    NSTEMI (non-ST elevated myocardial infarction) [facility])  Active Problems:    Diabetes mellitus without complication ([facility]  Resolved Problems:    * No resolved hospital problems. *      Admission Condition:  fair     Discharged Condition: good    Hospital Stay:     Hospital Course:  Jayde Kaufman is a 61 y.o. male who was admitted for the management of  [facility]STEM[facility] (non-ST elevated [facility]) [facility] , presented to ER with Chest Pain (for past week.  worse this morning )    Patient was initiated on ACS rule out protocol  His EKG did not reveal STEMI however there were some nonspecific ST-T wave changes  Serial troponins were elevated he was started on a heparin drip and an echo revealed apical hypokinesis with partially reduced EF 50 to 55%    He was taken for left heart cath and underwent successful drug-eluting stent placement to proximal and distal LAD    Ventriculography revealed ejection fraction of 40% redemonstrating previous wall motion abnormalities on echo    He was started on guideline directed medical therapy including DAPT and statin Coreg ACE inhibitor, and MRA    Physical exam day of discharge  NCAT EOMI  RRR S1-S2 normal physiologic splitting  CTA B no rhonchi wheezes rales  Extremities he has a 4 x 4 on right wrist where he had radial access for PCI  No peripheral edema  Normal affect and mood    Significant therapeutic interventions:     Significant Diagnostic Studies:   Labs / Micro:  BMP:    Lab Results   Component Value Date    GLUCOSE 175 06/18/2022     06/18/2022    K 3.7 06/18/2022     06/18/2022    CO2 25 06/18/2022    ANIONGAP 10 06/18/2022    BUN 9 06/18/2022    CREATININE 0.81 06/18/2022    BUNCRER 11 06/18/2022    CALCIUM 8.9 06/18/2022    LABGLOM >60 06/18/2022    GFRAA >60 06/18/2022    GFR      06/18/2022        Radiology:  XR CHEST 1 VIEW    Result Date: 6/17/2022  Low lung volumes with no acute process identified. Consultations:    Consults:     Final Specialist Recommendations/Findings:   IP CONSULT TO HOSPITALIST  IP CONSULT TO CARDIOLOGY  IP CONSULT TO CARDIAC REHAB  IP CONSULT TO CARDIAC REHAB      The patient was seen and examined on day of discharge and this discharge summary is in conjunction with any daily progress note from day of discharge.     Discharge plan:     Disposition: Home    Physician Follow Up:     Kevin Staton, APRN - CNP  [id number] [facility] [id number] [phone]             Requiring Further Evaluation/Follow Up POST HOSPITALIZATION/Incidental Findings: Repeat BMP 1 week monitor creatinine and potassium given initiation of MRA and ACE inhibitor    Diet: cardiac diet    Activity: As tolerated    Instructions to Patient:     Discharge Medications:      Medication List      START taking these medications    aspirin 81 MG EC tablet  Take 1 tablet by mouth daily     carvedilol 6.25 MG tablet  Commonly known as: COREG  Take 1 tablet by mouth 2 times daily (with meals)     lisinopril 5 MG tablet  Commonly known as: PRINIVIL;ZESTRIL  Take 1 tablet by mouth daily     nitroGLYCERIN 0.4 MG SL tablet  Commonly known as: NITROSTAT  up to max of 3 total doses.  If no relief after 1 dose, call 911.     spironolactone 25 MG tablet  Commonly known as: ALDACTONE  Take 0.5 tablets by mouth daily     ticagrelor 90 MG Tabs tablet  Commonly known as: BRILINTA  Take 1 tablet by mouth 2 times daily        CHANGE how you take these medications    atorvastatin 40 MG tablet  Commonly known as: LIPITOR  Take 1 tablet by mouth nightly  What changed:   · medication strength  · how much to take  · how to take this  · when to take this  · additional instructions        CONTINUE taking these medications    glucose monitoring kit  1 kit by Does not apply route daily     Lancets Misc  1 each by Does not apply route daily     metFORMIN 500 MG extended release tablet  Commonly known as: GLUCOPHAGE-XR  Take 4 tablets by mouth daily (with breakfast)     Respiratory Therapy Supplies Chantell  1 Device by Does not apply route nightly NASAL CPAP per recommendations of sleep medicine physician Dx : severe sleep apnea        STOP taking these medications    blood glucose test strips strip  Commonly known as: Accu-Chek [employer]           Where to Get Your Medications      These medications were sent to [facility] [id number] [place] [id number] [facility] [address] - P [phone] - F [phone]  [address] [place] [address], [address] [place], [place] [id number]    Phone: [phone]   · aspirin 81 MG EC tablet  · atorvastatin 40 MG tablet  · carvedilol 6.25 MG tablet  · lisinopril 5 MG tablet  · nitroGLYCERIN 0.4 MG SL tablet  · spironolactone 25 MG tablet  · ticagrelor 90 MG Tabs tablet         Discharge Procedure [address] 1878   Referral Priority: Routine Referral Type: Eval and Treat   Referral Reason: Specialty Services Required   Requested Specialty: Cardiac Rehabilitation   Number of Visits Requested: 1       Time Spent on discharge is  20 mins in patient examination, evaluation, counseling as well as medication reconciliation, prescriptions for required medications, discharge plan and follow up. Electronically signed by   Jaylan George MD  6/19/2022  12:39 PM      Thank you Dr. Suzette Robertson, APRN - CNP for the opportunity to be involved in this patient's care.

## 2022-06-19 NOTE — PROGRESS NOTES
Section of Cardiology  Progress Note      Date:  6/19/2022  Patient: Farhad Kramer  Admission:  6/17/2022  7:17 AM  Admit DX: NSTEMI (non-ST elevated myocardial infarction) [facility]) [I21.4]  Chest pain, unspecified type [R07.9]  Age:  61 y.o., 1965     LOS: 2 days     Reason for evaluation:   previous M. I. and coronary artery disease      SUBJECTIVE:     The patient was seen and examined. Notes and labs reviewed. There were not complications over night. Patient is ambulating with ease. Does not report restriction to his movement. Patient's cardiac review of systems: negative for chest pain and dyspnea. The patient is generally feeling He is improving    OBJECTIVE:    Telemetry: Sinus  /88   Pulse 84   Temp 97.5 °F (36.4 °C) (Temporal)   Resp 16   Ht 5' 8\" (1.727 m)   Wt 210 lb 1 oz (95.3 kg)   SpO2 91%   BMI 31.94 kg/m²     Intake/Output Summary (Last 24 hours) at 6/19/2022 1442  Last data filed at 6/19/2022 1319  Gross per 24 hour   Intake 730 ml   Output --   Net 730 ml       EXAM:   CONSTITUTIONAL:  awake, alert, cooperative, no apparent distress, and appears stated age. HEENT: Normal jugular venous pulsations, no carotid bruits. Head is atraumatic, normocephalic. Eyes and oral mucosa are normal.  LUNGS: Good respiratory effort. No for increased work of breathing. On auscultation: clear to auscultation bilaterally  CARDIOVASCULAR:  Normal apical impulse, regular rate and rhythm, normal S1 and S2, no S3 or S4,   ABDOMEN: Soft, nontender, nondistended. Bowel sounds present. SKIN: Warm and dry. EXTREMITIES: No lower extremities edema. Motor movement grossly intact. No cyanosis or clubbing.     Current Inpatient Medications:   insulin lispro  0-12 Units SubCUTAneous TID     insulin lispro  0-6 Units SubCUTAneous Nightly    famotidine  20 mg Oral BID    [Held by provider] metFORMIN  2,000 mg Oral Daily with breakfast    sodium chloride flush  5-40 mL IntraVENous 2 times per day    atorvastatin  40 mg Oral Nightly    sodium chloride flush  5-40 mL IntraVENous 2 times per day    aspirin  81 mg Oral Daily    ticagrelor  90 mg Oral BID    carvedilol  6.25 mg Oral BID WC       IV Infusions (if any):   dextrose      sodium chloride 100 mL/hr at 06/17/22 1039    sodium chloride      heparin (PORCINE) Infusion 10.5 Units/kg/hr (06/17/22 1214)    sodium chloride         Diagnostics:   EKG: . ECHO: .   Ejection fraction: %  Stress Test: .  Cardiac Angiography: .    Labs:   CBC:   Recent Labs     06/18/22  0427 06/19/22  0437   WBC 9.7 8.5   HGB 14.6 15.1   HCT 42.9 45.2    190     BMP:   Recent Labs     06/17/22  0727 06/18/22  0427    138   K 4.2 3.7   CO2 25 25   BUN 13 9   CREATININE 0.84 0.81   LABGLOM >60 >60   GLUCOSE 204* 175*     No results found for: BNP  PT/INR:   Recent Labs     06/17/22  0727 06/17/22  1221   PROTIME 12.6 13.5   INR 0.9 1.0     APTT:  Recent Labs     06/17/22  0727 06/17/22  1221   APTT 24.8 28.0     CARDIAC ENZYMES:No results for input(s): CKTOTAL, CKMB, CKMBINDEX, TROPONINT in the last 72 hours. FASTING LIPID PANEL:  Lab Results   Component Value Date    HDL 35 06/18/2022    [address] 123 03/01/2019    TRIG 193 06/18/2022     LIVER PROFILE:  Recent Labs     06/18/22 0427   AST 52*   ALT 29   LABALBU 4.3       ASSESSMENT:  · Non-STEMI, currently free of angina  · Multivessel CAD  · Status post PCI to the LAD, very long lesion  · Diabetes for 2 years  · Hyperlipidemia, treated  · Family history of CAD, both parents had bypass surgery in their 60-70's    Patient Active Problem List   Diagnosis    [facility]    NSTEMI (non-ST elevated myocardial infarction) ([facility]       PLAN:    1. Hemodynamically stable to be discharged  2. Follow-up with his cardiologist.  3. Referred to cardiac rehabilitation. 4. Okay to resume metformin. 5. All his questions were answered      Please see orders.   Discussed with patient and spouse and nursing.     Stephanie Catalan MD, MD

## 2022-06-19 NOTE — PROGRESS NOTES
Patient given discharge instructions, verbalized understanding. Patient discharged to private car to home. Belongings with patient.

## 2022-06-19 NOTE — PLAN OF CARE
Problem: Pain  Goal: Verbalizes/displays adequate comfort level or baseline comfort level  Outcome: Progressing     Problem: Safety - Adult  Goal: Free from fall injury  6/19/2022 0430 by Albin Aguayo RN  Outcome: Progressing  Flowsheets (Taken 6/19/2022 0429)  Free From Fall Injury: Instruct family/caregiver on patient safety  Flowsheets (Taken 6/18/2022 1844)  Free From Fall Injury: Instruct family/caregiver on patient safety  Note: Patient remained free of falls/injury during shift. Call light and bedside table within reach. Bed in lowest position. Bed breaks locked. 2/4 side rails up. Nonskid socks on. Patient calls out appropriately. Continue to monitor.       Problem: ABCDS Injury Assessment  Goal: Absence of physical injury  Outcome: Progressing     Problem: Chronic Conditions and Co-morbidities  Goal: Patient's chronic conditions and co-morbidity symptoms are monitored and maintained or improved  Outcome: Progressing  Flowsheets (Taken 6/18/2022 2000)  Care Plan - Patient's Chronic Conditions and Co-Morbidity Symptoms are Monitored and Maintained or Improved: Monitor and assess patient's chronic conditions and comorbid symptoms for stability, deterioration, or improvement

## 2022-06-20 LAB
EKG ATRIAL RATE: 66 BPM
EKG ATRIAL RATE: 73 BPM
EKG P AXIS: 39 DEGREES
EKG P AXIS: 57 DEGREES
EKG P-R INTERVAL: 146 MS
EKG P-R INTERVAL: 150 MS
EKG Q-T INTERVAL: 380 MS
EKG Q-T INTERVAL: 396 MS
EKG QRS DURATION: 94 MS
EKG QRS DURATION: 98 MS
EKG QTC CALCULATION (BAZETT): 415 MS
EKG QTC CALCULATION (BAZETT): 418 MS
EKG R AXIS: 27 DEGREES
EKG R AXIS: 63 DEGREES
EKG T AXIS: 23 DEGREES
EKG T AXIS: 64 DEGREES
EKG VENTRICULAR RATE: 66 BPM
EKG VENTRICULAR RATE: 73 BPM

## 2022-06-20 PROCEDURE — 93010 ELECTROCARDIOGRAM REPORT: CPT | Performed by: INTERNAL MEDICINE

## 2022-06-23 ENCOUNTER — OFFICE VISIT (OUTPATIENT)
Dept: FAMILY MEDICINE CLINIC | Age: 57
End: 2022-06-23
Payer: COMMERCIAL

## 2022-06-23 VITALS
BODY MASS INDEX: 31.43 KG/M2 | HEIGHT: 68 IN | DIASTOLIC BLOOD PRESSURE: 66 MMHG | TEMPERATURE: 97.6 F | WEIGHT: 207.4 LBS | SYSTOLIC BLOOD PRESSURE: 110 MMHG | HEART RATE: 77 BPM | OXYGEN SATURATION: 98 %

## 2022-06-23 DIAGNOSIS — Z09 HOSPITAL DISCHARGE FOLLOW-UP: ICD-10-CM

## 2022-06-23 DIAGNOSIS — E78.5 HYPERLIPIDEMIA, UNSPECIFIED HYPERLIPIDEMIA TYPE: ICD-10-CM

## 2022-06-23 DIAGNOSIS — I21.4 NSTEMI (NON-ST ELEVATED MYOCARDIAL INFARCTION) (HCC): Primary | ICD-10-CM

## 2022-06-23 DIAGNOSIS — E11.9 DIABETES MELLITUS WITHOUT COMPLICATION (HCC): ICD-10-CM

## 2022-06-23 PROCEDURE — 1111F DSCHRG MED/CURRENT MED MERGE: CPT | Performed by: NURSE PRACTITIONER

## 2022-06-23 PROCEDURE — 99215 OFFICE O/P EST HI 40 MIN: CPT | Performed by: NURSE PRACTITIONER

## 2022-06-23 RX ORDER — ATORVASTATIN CALCIUM 40 MG/1
40 TABLET, FILM COATED ORAL NIGHTLY
Qty: 90 TABLET | Refills: 1 | Status: SHIPPED | OUTPATIENT
Start: 2022-06-23

## 2022-06-23 RX ORDER — CARVEDILOL 6.25 MG/1
6.25 TABLET ORAL 2 TIMES DAILY WITH MEALS
Qty: 180 TABLET | Refills: 1 | Status: SHIPPED | OUTPATIENT
Start: 2022-06-23

## 2022-06-23 RX ORDER — LISINOPRIL 5 MG/1
5 TABLET ORAL DAILY
Qty: 90 TABLET | Refills: 1 | Status: SHIPPED | OUTPATIENT
Start: 2022-06-23

## 2022-06-23 RX ORDER — SPIRONOLACTONE 25 MG/1
12.5 TABLET ORAL DAILY
Qty: 45 TABLET | Refills: 1 | Status: SHIPPED | OUTPATIENT
Start: 2022-06-23 | End: 2022-08-29

## 2022-06-23 ASSESSMENT — ENCOUNTER SYMPTOMS
SINUS PAIN: 0
EYE PAIN: 0
BACK PAIN: 0
DIARRHEA: 0
SHORTNESS OF BREATH: 0
ABDOMINAL PAIN: 0
COUGH: 0
VOMITING: 0
NAUSEA: 0
SORE THROAT: 0

## 2022-06-23 NOTE — LETTER
[address] [place] [address] [address] [address]  Phone: [phone]  Fax: [phone]    CLARY Hardwick CNP        June 23, 2022     Patient: Ju Mata   YOB: 1965   Date of Visit: 6/23/2022       To Whom It May Concern: It is my medical opinion that Ju Mata may return to work on 6/27/22 with no restrictions. If you have any questions or concerns, please don't hesitate to call.     Sincerely,        CLARY Hardwick CNP

## 2022-06-23 NOTE — PATIENT INSTRUCTIONS
Patient Education        Learning About Type 2 Diabetes  What is type 2 diabetes? Type 2 diabetes is a condition in which you have too much sugar (glucose) in your blood. Glucose is a type of sugar produced in your body when carbohydratesand other foods are digested. It provides energy to cells throughout the body. Normally, blood sugar levels increase after you eat a meal. When blood sugar rises, cells in the pancreas release insulin, which causes the body to absorbsugar from the blood and lowers the blood sugar level to normal.  When you have type 2 diabetes, sugar stays in the blood rather than entering the body's cells to be used for energy. This results in high blood sugar. Ithappens when your body can't use insulin the right way. Over time, high blood sugar can harm many parts of the body, such as your eyes, heart, blood vessels, nerves, and kidneys. It can also increase your risk forother health problems (complications). What can you expect with type 2 diabetes? Pieter Pierre keep hearing about how important it is to keep your blood sugar within a target range. That's because over time, high blood sugar can lead to seriousproblems. It can:   Harm your eyes, nerves, and kidneys.  Damage your blood vessels, leading to heart disease and stroke.  Reduce blood flow and cause nerve damage to parts of your body, especially your feet. This can cause slow healing and pain when you walk.  Make your immune system weak and less able to fight infections. When people hear the word \"diabetes,\" they often think of problems like these. But daily care and treatment can help prevent or delay these problems. The goal is to keep your blood sugar in a target range. That's the best way to reduceyour chance of having more problems from diabetes. What are the symptoms? Some people who have type 2 diabetes may not have any symptoms early on.  Many people with the disease don't even know they have it at first. But with time, diabetes starts to cause symptoms. You have most symptoms of type 2 diabeteswhen your blood sugar is either too high or too low. The most common symptoms of high blood sugar include:   Thirst.   Needing to urinate often.  Weight loss.  Blurry vision. The symptoms of low blood sugar include:   Sweating.  Shakiness.  Weakness.  Hunger.  Confusion. You're not likely to get symptoms of low blood sugar unless you take insulin oruse certain diabetes medicines that lower blood sugar. How can you help prevent type 2 diabetes? There are things you can do to help prevent type 2 diabetes. Stay at a healthy weight. Exercise regularly, and eat healthy foods. Even small changes can make a difference. If you have prediabetes, the medicine metformin can help preventtype 2 diabetes. How is type 2 diabetes treated? Treatment for type 2 diabetes will change over time to meet your needs. But the focus of your treatment will usually be to keep your blood sugar levels in your target range. This will help prevent problems such as eye, kidney, heart, bloodvessel, and nerve disease. Some people may need medicines to help their bodies make insulin or decrease insulin resistance. Some medicines slow down how quickly the body absorbscarbohydrates. Treatment to manage type 2 diabetes includes:   Making healthy food choices and being active.  Losing weight, if you need to.  Seeing your doctor regularly.  Keeping your blood sugar in your target range.  Taking medicines, if you need them.  Quitting smoking, if you smoke.  Keeping your blood pressure and cholesterol under control. Follow-up care is a key part of your treatment and safety. Be sure to make and go to all appointments, and call your doctor if you are having problems. It's also a good idea to know your test results and keep alist of the medicines you take. Where can you learn more? Go to [email or web address] and sign in to your [employer] account. Enter D752 in the [facility] box to learn more about \"Learning About Type 2 Diabetes. \"     If you do not have an account, please click on the \"Sign Up Now\" link. Current as of: July 28, 2021               Content Version: 13.3  © 5671-3289 Healthwise, Incorporated. Care instructions adapted under license by [facility] ([facility]). If you have questions about a medical condition or this instruction, always ask your healthcare professional. [address] any warranty or liability for your use of this information.

## 2022-06-23 NOTE — PROGRESS NOTES
[address] [facility]  [address] [facility] [address] [address]  Dept: [phone]  Dept Fax: [phone]    Dania Almeida is a 61 y.o. male who presents today for his medicalconditions/complaints as noted below. Dania Almeida is c/o of Chest Pain (pt was in [facility] on 6/17/2022 for chest pain. pt ended up having a cath with two stents placed. ) and Other (release to go back to work )      HPI:     58-year-old male patient presents with complaints of hospital follow up    NSTEMI, subsequent cath and stent placed per Dr. Óscar Duarte. Doing well postoperatively. Was started on plavix, aspirin, lisinopril, coreg and spironolactone. Has follow up with cardiology scheduled.      History of type 2 diabetes. Currently taking Metformin 2000 daily. Last a1c, decreased to 7.5. Reports that he has not been very compliant with diet or exercise. Weight unchanged from previous.      Hyperlipidemia, currently managed with atorvastatin 40, last lipid level stable          Past Medical History:   Diagnosis Date    DM (diabetes mellitus) ([facility] [address]     Hyperlipidemia         Current Outpatient Medications   Medication Sig Dispense Refill    spironolactone (ALDACTONE) 25 MG tablet Take 0.5 tablets by mouth daily 45 tablet 1    ticagrelor (BRILINTA) 90 MG TABS tablet Take 1 tablet by mouth 2 times daily 180 tablet 1    lisinopril (PRINIVIL;ZESTRIL) 5 MG tablet Take 1 tablet by mouth daily 90 tablet 1    carvedilol (COREG) 6.25 MG tablet Take 1 tablet by mouth 2 times daily (with meals) 180 tablet 1    atorvastatin (LIPITOR) 40 MG tablet Take 1 tablet by mouth nightly 90 tablet 1    nitroGLYCERIN (NITROSTAT) 0.4 MG SL tablet up to max of 3 total doses.  If no relief after 1 dose, call 911. 25 tablet 3    aspirin 81 MG EC tablet Take 1 tablet by mouth daily 30 tablet 3    metFORMIN (GLUCOPHAGE-XR) 500 mg extended release tablet Take 4 tablets by mouth daily (with breakfast) 360 tablet 1    glucose monitoring kit (FREESTYLE) monitoring kit 1 kit by Does not apply route daily 1 kit 0    Lancets MISC 1 each by Does not apply route daily 100 each 5    Respiratory Therapy Supplies KEILY 1 Device by Does not apply route nightly NASAL CPAP per recommendations of sleep medicine physician Dx : severe sleep apnea 1 Device 0     No current facility-administered medications for this visit. No Known Allergies    Subjective:      Review of Systems   Constitutional: Negative for chills and fatigue. HENT: Negative for congestion, ear pain, sinus pain and sore throat. Eyes: Negative for pain and visual disturbance. Respiratory: Negative for cough and shortness of breath. Cardiovascular: Positive for chest pain. Negative for palpitations. Gastrointestinal: Negative for abdominal pain, diarrhea, nausea and vomiting. Genitourinary: Negative for penile pain and testicular pain. Musculoskeletal: Negative for back pain, joint swelling and neck pain. Skin: Negative for rash. Neurological: Negative for dizziness and light-headedness. Hematological: Does not bruise/bleed easily. All other systems reviewed and are negative.      :Objective     Physical Exam  Vitals and nursing note reviewed. Constitutional:       General: He is not in acute distress. Appearance: Normal appearance. He is not toxic-appearing. Cardiovascular:      Rate and Rhythm: Normal rate. Pulmonary:      Effort: Pulmonary effort is normal.      Breath sounds: Normal breath sounds. Neurological:      General: No focal deficit present. Mental Status: He is alert and oriented to person, place, and time.        /66 (Site: Left Upper Arm, Position: Sitting, Cuff Size: Large Adult)   Pulse 77   Temp 97.6 °F (36.4 °C) (Tympanic)   Ht 5' 8\" (1.727 m)   Wt 207 lb 6.4 oz (94.1 kg)   SpO2 98%   BMI 31.54 kg/m²     Lab Review   Admission on 06/17/2022, Discharged on 06/19/2022   Component Date Value  Ventricular Rate 06/17/2022 77     Atrial Rate 06/17/2022 77     P-R Interval 06/17/2022 150     QRS Duration 06/17/2022 96     Q-T Interval 06/17/2022 364     QTc Calculation (Bazett) 06/17/2022 411     P Axis 06/17/2022 55     R Axis 06/17/2022 46     T Axis 06/17/2022 42     WBC 06/17/2022 7.7     RBC 06/17/2022 5.23     Hemoglobin 06/17/2022 15.7     Hematocrit 06/17/2022 46.4     MCV 06/17/2022 88.7     MCH 06/17/2022 30.0     MCHC 06/17/2022 33.8     RDW 06/17/2022 12.0     Platelets 93/84/6900 201     MPV 06/17/2022 10.9     NRBC Automated 06/17/2022 0.0     Seg Neutrophils 06/17/2022 63     Lymphocytes 06/17/2022 25     Monocytes 06/17/2022 9     Eosinophils % 06/17/2022 1     Basophils 06/17/2022 1     Immature Granulocytes 06/17/2022 1*    Segs Absolute 06/17/2022 4.84     Absolute Lymph # 06/17/2022 1.94     Absolute Mono # 06/17/2022 0.68     Absolute Eos # 06/17/2022 0.10     Basophils Absolute 06/17/2022 0.06     Absolute Immature Granul* 06/17/2022 0.04     Glucose 06/17/2022 204*    BUN 06/17/2022 13     CREATININE 06/17/2022 0.84     Bun/Cre Ratio 06/17/2022 15     Calcium 06/17/2022 9.3     Sodium 06/17/2022 142     Potassium 06/17/2022 4.2     Chloride 06/17/2022 103     CO2 06/17/2022 25     Anion Gap 06/17/2022 14     GFR Non- 06/17/2022 >60     GFR  06/17/2022 >60     GFR Comment 06/17/2022          Troponin, High Sensitivi* 06/17/2022 29*    Troponin, High Sensitivi* 06/17/2022 101*    Pro-BNP 06/17/2022 99     Ventricular Rate 06/17/2022 80     Atrial Rate 06/17/2022 80     P-R Interval 06/17/2022 154     QRS Duration 06/17/2022 94     Q-T Interval 06/17/2022 380     QTc Calculation (Bazett) 06/17/2022 438     P Axis 06/17/2022 59     R Axis 06/17/2022 47     T Axis 06/17/2022 54     Protime 06/17/2022 12.6     INR 06/17/2022 0.9     PTT 06/17/2022 24.8     Troponin, High Sensitivi* 06/17/2022 283*  WBC 06/17/2022 9.3     RBC 06/17/2022 5.04     Hemoglobin 06/17/2022 15.1     Hematocrit 06/17/2022 44.7     MCV 06/17/2022 88.7     MCH 06/17/2022 30.0     MCHC 06/17/2022 33.8     RDW 06/17/2022 12.1     Platelets 53/14/4297 208     MPV 06/17/2022 10.8     NRBC Automated 06/17/2022 0.0     PTT 06/17/2022 28.0     Protime 06/17/2022 13.5     INR 06/17/2022 1.0     Anti-XA Unfrac Heparin 06/18/2022 <0.10*    Ventricular Rate 06/17/2022 66     Atrial Rate 06/17/2022 66     P-R Interval 06/17/2022 146     QRS Duration 06/17/2022 94     Q-T Interval 06/17/2022 396     QTc Calculation (Bazett) 06/17/2022 415     P Axis 06/17/2022 39     R Axis 06/17/2022 27     T Axis 06/17/2022 23     Glucose 06/18/2022 175*    BUN 06/18/2022 9     CREATININE 06/18/2022 0.81     Bun/Cre Ratio 06/18/2022 11     Calcium 06/18/2022 8.9     Sodium 06/18/2022 138     Potassium 06/18/2022 3.7     Chloride 06/18/2022 103     CO2 06/18/2022 25     Anion Gap 06/18/2022 10     Alkaline Phosphatase 06/18/2022 74     ALT 06/18/2022 29     AST 06/18/2022 52*    Total Bilirubin 06/18/2022 0.84     Total Protein 06/18/2022 6.4     Albumin 06/18/2022 4.3     GFR Non- 06/18/2022 >60     GFR  06/18/2022 >60     GFR Comment 06/18/2022          Magnesium 06/18/2022 2.0     Cholesterol 06/18/2022 152     HDL 06/18/2022 35*    LDL Cholesterol 06/18/2022 78     Chol/HDL Ratio 06/18/2022 4.3     Triglycerides 06/18/2022 193*    WBC 06/18/2022 9.7     RBC 06/18/2022 4.89     Hemoglobin 06/18/2022 14.6     Hematocrit 06/18/2022 42.9     MCV 06/18/2022 87.7     MCH 06/18/2022 29.9     MCHC 06/18/2022 34.0     RDW 06/18/2022 12.1     Platelets 07/09/8510 186     MPV 06/18/2022 10.6     NRBC Automated 06/18/2022 0.0     Anti-XA Unfrac Heparin 06/18/2022 <0.10*    Ventricular Rate 06/18/2022 73     Atrial Rate 06/18/2022 73     P-R Interval 06/18/2022 150     QRS Duration 06/18/2022 98     Q-T Interval 06/18/2022 380     QTc Calculation (Bazett) 06/18/2022 418     P Axis 06/18/2022 57     R Axis 06/18/2022 63     T Axis 06/18/2022 64     Anti-XA Unfrac Heparin 06/18/2022 <0.10*    Anti-XA Unfrac Heparin 06/18/2022 <0.10*    POC Glucose 06/18/2022 188*    Anti-XA Unfrac Heparin 06/19/2022 <0.10*    POC Glucose 06/18/2022 158*    POC Glucose 06/18/2022 159*    Anti-XA Unfrac Heparin 06/19/2022 <0.10*    WBC 06/19/2022 8.5     RBC 06/19/2022 5.16     Hemoglobin 06/19/2022 15.1     Hematocrit 06/19/2022 45.2     MCV 06/19/2022 87.6     MCH 06/19/2022 29.3     MCHC 06/19/2022 33.4     RDW 06/19/2022 11.9     Platelets 29/06/9379 190     MPV 06/19/2022 10.6     NRBC Automated 06/19/2022 0.0     POC Glucose 06/19/2022 164*    POC Glucose 06/19/2022 1425 Cele Vargas A Outpatient Visit on 06/01/2022   Component Date Value    Hemoglobin A1C 06/01/2022 7.5*    Estimated Avg Glucose 06/01/2022 169     PSA 06/01/2022 1.12     Cholesterol 06/01/2022 177     HDL 06/01/2022 40*    LDL Cholesterol 06/01/2022 109     Chol/HDL Ratio 06/01/2022 4.4     Triglycerides 06/01/2022 138     TSH 06/01/2022 2.81     Glucose 06/01/2022 170*    BUN 06/01/2022 17     CREATININE 06/01/2022 0.80     Calcium 06/01/2022 9.4     Sodium 06/01/2022 137     Potassium 06/01/2022 4.1     Chloride 06/01/2022 101     CO2 06/01/2022 25     Anion Gap 06/01/2022 11     Alkaline Phosphatase 06/01/2022 77     ALT 06/01/2022 26     AST 06/01/2022 17     Total Bilirubin 06/01/2022 0.63     Total Protein 06/01/2022 6.8     Albumin 06/01/2022 4.7     Albumin/Globulin Ratio 06/01/2022 2.2     GFR Non- 06/01/2022 >60     GFR  06/01/2022 >60     GFR Comment 06/01/2022          WBC 06/01/2022 6.1     RBC 06/01/2022 5.15     Hemoglobin 06/01/2022 15.5     Hematocrit 06/01/2022 45.5     MCV 06/01/2022 88.3     MCH 06/01/2022 30.1     MCHC 06/01/2022 34.1     RDW 06/01/2022 12.2     Platelets 06/94/0053 218     MPV 06/01/2022 11.3     NRBC Automated 06/01/2022 0.0     Seg Neutrophils 06/01/2022 58     Lymphocytes 06/01/2022 27     Monocytes 06/01/2022 11     Eosinophils % 06/01/2022 2     Basophils 06/01/2022 1     Immature Granulocytes 06/01/2022 1*    Segs Absolute 06/01/2022 3.63     Absolute Lymph # 06/01/2022 1.63     Absolute Mono # 06/01/2022 0.65     Absolute Eos # 06/01/2022 0.12     Basophils Absolute 06/01/2022 0.06     Absolute Immature Granul* 06/01/2022 0.04    Office Visit on 02/21/2022   Component Date Value    Hemoglobin A1C 02/21/2022 7.9        Assessment and Plan      1. NSTEMI (non-ST elevated myocardial infarction) (HCC)  -     spironolactone (ALDACTONE) 25 MG tablet; Take 0.5 tablets by mouth daily, Disp-45 tablet, R-1Normal  -     ticagrelor (BRILINTA) 90 MG TABS tablet; Take 1 tablet by mouth 2 times daily, Disp-180 tablet, R-1Normal  -     lisinopril (PRINIVIL;ZESTRIL) 5 MG tablet; Take 1 tablet by mouth daily, Disp-90 tablet, R-1Normal  -     carvedilol (COREG) 6.25 MG tablet; Take 1 tablet by mouth 2 times daily (with meals), Disp-180 tablet, R-1Normal  -     atorvastatin (LIPITOR) 40 MG tablet; Take 1 tablet by mouth nightly, Disp-90 tablet, R-1Normal  2. Hyperlipidemia, unspecified hyperlipidemia type  -     atorvastatin (LIPITOR) 40 MG tablet; Take 1 tablet by mouth nightly, Disp-90 tablet, R-1Normal  3. Diabetes mellitus without complication [facility])       Hospitalization course reviewed  Refills sent  Encouraged to f/u with cardio  Med check 3 months, sooner prn          No results found for this visit on 06/23/22. Return in about 3 months (around 9/23/2022), or if symptoms worsen or fail to improve, for a1c.         Orders Placed This Encounter   Medications    spironolactone (ALDACTONE) 25 MG tablet     Sig: Take 0.5 tablets by mouth daily     Dispense:  45 tablet     Refill:  1    ticagrelor (BRILINTA) 90 MG TABS tablet     Sig: Take 1 tablet by mouth 2 times daily     Dispense:  180 tablet     Refill:  1    lisinopril (PRINIVIL;ZESTRIL) 5 MG tablet     Sig: Take 1 tablet by mouth daily     Dispense:  90 tablet     Refill:  1    carvedilol (COREG) 6.25 MG tablet     Sig: Take 1 tablet by mouth 2 times daily (with meals)     Dispense:  180 tablet     Refill:  1    atorvastatin (LIPITOR) 40 MG tablet     Sig: Take 1 tablet by mouth nightly     Dispense:  90 tablet     Refill:  1        Patient given educational materials - see patient instructions. Discussed use, benefit, and side effects of prescribed medications. All patientquestions answered. Pt voiced understanding. Patient given educational materials - see patient instructions. Discussed use, benefit, and side effects of prescribed medications. All patientquestions answered. Pt voiced understanding. This note was transcribed using dictation with Dragon services. Efforts were made to correct any errors but some words may be misinterpreted.     Patient assumes risks associated with failure to complete recommended testing and treatments in a timely manner    Electronically signed by CLARY Aguayo CNP on 6/23/2022at 10:57 AM Equal and normal pulses (carotid, femoral, dorsalis pedis)

## 2022-06-29 ENCOUNTER — HOSPITAL ENCOUNTER (OUTPATIENT)
Dept: CARDIAC REHAB | Age: 57
Setting detail: THERAPIES SERIES
Discharge: HOME | End: 2022-06-29
Payer: COMMERCIAL

## 2022-06-29 VITALS
BODY MASS INDEX: 30.77 KG/M2 | HEIGHT: 68 IN | SYSTOLIC BLOOD PRESSURE: 124 MMHG | DIASTOLIC BLOOD PRESSURE: 70 MMHG | WEIGHT: 203 LBS

## 2022-06-29 PROCEDURE — 93798 PHYS/QHP OP CAR RHAB W/ECG: CPT

## 2022-06-29 ASSESSMENT — PATIENT HEALTH QUESTIONNAIRE - PHQ9
SUM OF ALL RESPONSES TO PHQ QUESTIONS 1-9: 0
SUM OF ALL RESPONSES TO PHQ QUESTIONS 1-9: 0
1. LITTLE INTEREST OR PLEASURE IN DOING THINGS: 0
2. FEELING DOWN, DEPRESSED OR HOPELESS: 0
SUM OF ALL RESPONSES TO PHQ9 QUESTIONS 1 & 2: 0
SUM OF ALL RESPONSES TO PHQ QUESTIONS 1-9: 0
SUM OF ALL RESPONSES TO PHQ QUESTIONS 1-9: 0

## 2022-06-29 NOTE — CARDIO/PULMONARY
Pt oriented to Cardiac Rehab, goal set and ITP initiated. CAD Risk Factors and Prevention video viewed. Pt educated on Rate of Perceived Education scale. Consents signed.

## 2022-07-01 ENCOUNTER — HOSPITAL ENCOUNTER (OUTPATIENT)
Dept: CARDIAC REHAB | Age: 57
Setting detail: THERAPIES SERIES
Discharge: HOME | End: 2022-07-01
Payer: COMMERCIAL

## 2022-07-01 VITALS — WEIGHT: 202.3 LBS | BODY MASS INDEX: 30.76 KG/M2

## 2022-07-01 PROCEDURE — 93798 PHYS/QHP OP CAR RHAB W/ECG: CPT

## 2022-07-06 ENCOUNTER — HOSPITAL ENCOUNTER (OUTPATIENT)
Dept: CARDIAC REHAB | Age: 57
Setting detail: THERAPIES SERIES
Discharge: HOME | End: 2022-07-06
Payer: COMMERCIAL

## 2022-07-06 VITALS — WEIGHT: 203 LBS | BODY MASS INDEX: 30.87 KG/M2

## 2022-07-06 PROCEDURE — 93798 PHYS/QHP OP CAR RHAB W/ECG: CPT

## 2022-07-08 ENCOUNTER — HOSPITAL ENCOUNTER (OUTPATIENT)
Dept: CARDIAC REHAB | Age: 57
Setting detail: THERAPIES SERIES
Discharge: HOME | End: 2022-07-08
Payer: COMMERCIAL

## 2022-07-08 ENCOUNTER — HOSPITAL ENCOUNTER (OUTPATIENT)
Age: 57
Setting detail: SPECIMEN
Discharge: HOME | End: 2022-07-08

## 2022-07-08 LAB
ANION GAP SERPL CALCULATED.3IONS-SCNC: 14 MMOL/L (ref 9–17)
BUN BLDV-MCNC: 16 MG/DL (ref 6–20)
CALCIUM SERPL-MCNC: 9.8 MG/DL (ref 8.6–10.4)
CHLORIDE BLD-SCNC: 98 MMOL/L (ref 98–107)
CO2: 24 MMOL/L (ref 20–31)
CREAT SERPL-MCNC: 1.03 MG/DL (ref 0.7–1.2)
GFR AFRICAN AMERICAN: >60 ML/MIN
GFR NON-AFRICAN AMERICAN: >60 ML/MIN
GFR SERPL CREATININE-BSD FRML MDRD: ABNORMAL ML/MIN/{1.73_M2}
GLUCOSE BLD-MCNC: 228 MG/DL (ref 70–99)
POTASSIUM SERPL-SCNC: 4.5 MMOL/L (ref 3.7–5.3)
SODIUM BLD-SCNC: 136 MMOL/L (ref 135–144)

## 2022-07-11 ENCOUNTER — HOSPITAL ENCOUNTER (OUTPATIENT)
Dept: CARDIAC REHAB | Age: 57
Setting detail: THERAPIES SERIES
Discharge: HOME | End: 2022-07-11
Payer: COMMERCIAL

## 2022-07-11 VITALS — BODY MASS INDEX: 30.46 KG/M2 | WEIGHT: 200.3 LBS

## 2022-07-11 LAB — DIABETIC RETINOPATHY: NEGATIVE

## 2022-07-11 PROCEDURE — 93798 PHYS/QHP OP CAR RHAB W/ECG: CPT

## 2022-07-13 ENCOUNTER — HOSPITAL ENCOUNTER (OUTPATIENT)
Dept: CARDIAC REHAB | Age: 57
Setting detail: THERAPIES SERIES
Discharge: HOME | End: 2022-07-13
Payer: COMMERCIAL

## 2022-07-13 VITALS — WEIGHT: 200.9 LBS | BODY MASS INDEX: 30.55 KG/M2

## 2022-07-13 PROCEDURE — 93798 PHYS/QHP OP CAR RHAB W/ECG: CPT

## 2022-07-18 ENCOUNTER — HOSPITAL ENCOUNTER (OUTPATIENT)
Dept: CARDIAC REHAB | Age: 57
Setting detail: THERAPIES SERIES
Discharge: HOME | End: 2022-07-18
Payer: COMMERCIAL

## 2022-07-18 VITALS — BODY MASS INDEX: 30.43 KG/M2 | WEIGHT: 200.1 LBS

## 2022-07-18 PROCEDURE — 93798 PHYS/QHP OP CAR RHAB W/ECG: CPT

## 2022-07-20 ENCOUNTER — HOSPITAL ENCOUNTER (OUTPATIENT)
Dept: CARDIAC REHAB | Age: 57
Setting detail: THERAPIES SERIES
Discharge: HOME | End: 2022-07-20
Payer: COMMERCIAL

## 2022-07-20 VITALS — BODY MASS INDEX: 30.47 KG/M2 | WEIGHT: 200.4 LBS

## 2022-07-20 PROCEDURE — 93798 PHYS/QHP OP CAR RHAB W/ECG: CPT

## 2022-07-22 ENCOUNTER — HOSPITAL ENCOUNTER (OUTPATIENT)
Dept: CARDIAC REHAB | Age: 57
Setting detail: THERAPIES SERIES
Discharge: HOME | End: 2022-07-22
Payer: COMMERCIAL

## 2022-07-22 VITALS — BODY MASS INDEX: 30.43 KG/M2 | WEIGHT: 200.1 LBS

## 2022-07-22 DIAGNOSIS — E11.9 DIABETES MELLITUS WITHOUT COMPLICATION (HCC): ICD-10-CM

## 2022-07-22 PROCEDURE — 93798 PHYS/QHP OP CAR RHAB W/ECG: CPT

## 2022-07-22 RX ORDER — METFORMIN HYDROCHLORIDE 500 MG/1
2000 TABLET, EXTENDED RELEASE ORAL
Qty: 360 TABLET | Refills: 1 | Status: SHIPPED | OUTPATIENT
Start: 2022-07-22 | End: 2022-10-20

## 2022-07-25 ENCOUNTER — HOSPITAL ENCOUNTER (OUTPATIENT)
Dept: CARDIAC REHAB | Age: 57
Setting detail: THERAPIES SERIES
Discharge: HOME | End: 2022-07-25
Payer: COMMERCIAL

## 2022-07-25 VITALS — BODY MASS INDEX: 30.41 KG/M2 | WEIGHT: 200 LBS

## 2022-07-25 PROCEDURE — 93798 PHYS/QHP OP CAR RHAB W/ECG: CPT

## 2022-07-27 ENCOUNTER — HOSPITAL ENCOUNTER (OUTPATIENT)
Dept: CARDIAC REHAB | Age: 57
Setting detail: THERAPIES SERIES
Discharge: HOME | End: 2022-07-27
Payer: COMMERCIAL

## 2022-07-27 VITALS — BODY MASS INDEX: 30.49 KG/M2 | WEIGHT: 200.5 LBS

## 2022-07-27 PROCEDURE — 93798 PHYS/QHP OP CAR RHAB W/ECG: CPT

## 2022-07-29 ENCOUNTER — HOSPITAL ENCOUNTER (OUTPATIENT)
Dept: CARDIAC REHAB | Age: 57
Setting detail: THERAPIES SERIES
Discharge: HOME | End: 2022-07-29
Payer: COMMERCIAL

## 2022-07-29 VITALS — HEIGHT: 68 IN | WEIGHT: 199.1 LBS | BODY MASS INDEX: 30.17 KG/M2

## 2022-07-29 PROCEDURE — 93798 PHYS/QHP OP CAR RHAB W/ECG: CPT

## 2022-08-01 ENCOUNTER — HOSPITAL ENCOUNTER (OUTPATIENT)
Dept: CARDIAC REHAB | Age: 57
Setting detail: THERAPIES SERIES
Discharge: HOME | End: 2022-08-01
Payer: COMMERCIAL

## 2022-08-01 VITALS — WEIGHT: 197.9 LBS | BODY MASS INDEX: 30.09 KG/M2

## 2022-08-01 PROCEDURE — 93798 PHYS/QHP OP CAR RHAB W/ECG: CPT

## 2022-08-03 ENCOUNTER — HOSPITAL ENCOUNTER (OUTPATIENT)
Dept: CARDIAC REHAB | Age: 57
Setting detail: THERAPIES SERIES
Discharge: HOME | End: 2022-08-03
Payer: COMMERCIAL

## 2022-08-03 ENCOUNTER — OFFICE VISIT (OUTPATIENT)
Dept: PRIMARY CARE CLINIC | Age: 57
End: 2022-08-03
Payer: COMMERCIAL

## 2022-08-03 VITALS
DIASTOLIC BLOOD PRESSURE: 64 MMHG | HEART RATE: 88 BPM | OXYGEN SATURATION: 94 % | TEMPERATURE: 98 F | SYSTOLIC BLOOD PRESSURE: 104 MMHG

## 2022-08-03 VITALS — BODY MASS INDEX: 30.23 KG/M2 | WEIGHT: 198.8 LBS

## 2022-08-03 DIAGNOSIS — L03.213 PERIORBITAL CELLULITIS OF RIGHT EYE: Primary | ICD-10-CM

## 2022-08-03 PROCEDURE — 93798 PHYS/QHP OP CAR RHAB W/ECG: CPT

## 2022-08-03 PROCEDURE — 99213 OFFICE O/P EST LOW 20 MIN: CPT | Performed by: FAMILY MEDICINE

## 2022-08-03 RX ORDER — SULFAMETHOXAZOLE AND TRIMETHOPRIM 800; 160 MG/1; MG/1
1 TABLET ORAL 2 TIMES DAILY
Qty: 14 TABLET | Refills: 0 | Status: SHIPPED | OUTPATIENT
Start: 2022-08-03 | End: 2022-08-10

## 2022-08-03 ASSESSMENT — ENCOUNTER SYMPTOMS
BLURRED VISION: 0
DOUBLE VISION: 0
PHOTOPHOBIA: 0
EYE DISCHARGE: 0
EYE ITCHING: 0

## 2022-08-03 NOTE — PROGRESS NOTES
[facility]  [address]  [place] [address]  Dept: [phone]  Dept Fax: [phone]    Stacey Dhaliwal is a 61 y.o. male who presents today for his medical conditions/complaintsas noted below. Stacey Dhaliwal is c/o of Other (Swollen right eye, started feeling scratchy on Monday, slightly itchy)        HPI:     Eye Problem   The right eye is affected. This is a new problem. The current episode started in the past 7 days (2 days). The problem occurs constantly. The problem has been unchanged. There was no injury mechanism. There is No known exposure to pink eye. He Wears contacts. Pertinent negatives include no blurred vision, eye discharge, double vision, fever, itching, photophobia or recent URI. He has tried nothing for the symptoms. The treatment provided no relief. Past Medical History:   Diagnosis Date    DM (diabetes mellitus) ([facility] [address]     Hyperlipidemia     Past medical history reviewed and pertinent positives/negatives in the HPI    Past Surgical History:   Procedure Laterality Date    COLONOSCOPY         Family History   Problem Relation Age of Onset    High Cholesterol Mother     Diabetes Mother     Heart Disease Father        Social History     Tobacco Use    Smoking status: Never    Smokeless tobacco: Never   Substance Use Topics    Alcohol use: Yes     Alcohol/week: 2.0 standard drinks     Types: 2 Cans of beer per week     Comment: beer once a month, glass of wine twice a week      Current Outpatient Medications   Medication Sig Dispense Refill    sulfamethoxazole-trimethoprim (BACTRIM DS) 800-160 MG per tablet Take 1 tablet by mouth in the morning and 1 tablet before bedtime. Do all this for 7 days.  14 tablet 0    metFORMIN (GLUCOPHAGE-XR) 500 MG extended release tablet Take 4 tablets by mouth daily (with breakfast) 360 tablet 1    ticagrelor (BRILINTA) 90 MG TABS tablet Take 1 tablet by mouth 2 times daily 180 tablet 1    lisinopril (PRINIVIL;ZESTRIL) 5 MG tablet Take 1 tablet by mouth daily 90 tablet 1    carvedilol (COREG) 6.25 MG tablet Take 1 tablet by mouth 2 times daily (with meals) 180 tablet 1    atorvastatin (LIPITOR) 40 MG tablet Take 1 tablet by mouth nightly 90 tablet 1    nitroGLYCERIN (NITROSTAT) 0.4 MG SL tablet up to max of 3 total doses. If no relief after 1 dose, call 911. 25 tablet 3    aspirin 81 MG EC tablet Take 1 tablet by mouth daily 30 tablet 3    glucose monitoring kit (FREESTYLE) monitoring kit 1 kit by Does not apply route daily 1 kit 0    Lancets MISC 1 each by Does not apply route daily 100 each 5    Respiratory Therapy Supplies KEILY 1 Device by Does not apply route nightly NASAL CPAP per recommendations of sleep medicine physician Dx : severe sleep apnea 1 Device 0    spironolactone (ALDACTONE) 25 MG tablet Take 0.5 tablets by mouth daily (Patient not taking: No sig reported) 45 tablet 1     No current facility-administered medications for this visit. No Known Allergies    Health Maintenance   Topic Date Due    COVID-19 Vaccine (1) Never done    Pneumococcal 0-64 years Vaccine (1 - PCV) Never done    HIV screen  Never done    Diabetic microalbuminuria test  Never done    Hepatitis C screen  Never done    Hepatitis B vaccine (1 of 3 - Risk 3-dose series) Never done    Diabetic foot exam  11/22/2020    Flu vaccine (1) 09/01/2022    A1C test (Diabetic or Prediabetic)  06/01/2023    Prostate Specific Antigen (PSA) Screening or Monitoring  06/01/2023    Lipids  06/18/2023    Depression Screen  06/29/2023    Diabetic retinal exam  07/11/2024    DTaP/Tdap/Td vaccine (2 - Td or Tdap) 10/07/2024    Colorectal Cancer Screen  12/05/2024    Shingles vaccine  Completed    Hepatitis A vaccine  Aged Out    Hib vaccine  Aged Out    Meningococcal (ACWY) vaccine  Aged Out       :      Review of Systems   Constitutional:  Negative for fever.    Eyes:  Negative for blurred vision, double vision, photophobia, discharge and itching. Objective:     Physical Exam  Vitals and nursing note reviewed. Constitutional:       Appearance: Normal appearance. HENT:      Head: Normocephalic and atraumatic. Right Ear: Hearing normal.      Left Ear: Hearing normal.      Mouth/Throat:      Lips: Pink. Eyes:      General: Lids are everted, no foreign bodies appreciated. Right eye: No discharge or hordeolum. Extraocular Movements: Extraocular movements intact. Conjunctiva/sclera: Conjunctivae normal.      Right eye: Right conjunctiva is not injected. No exudate. Comments: Swelling and erythema surrounding right eye   Cardiovascular:      Rate and Rhythm: Normal rate. Pulmonary:      Effort: Pulmonary effort is normal.   Musculoskeletal:      Cervical back: No muscular tenderness. Skin:     General: Skin is warm and dry. Neurological:      Mental Status: He is alert and oriented to person, place, and time. Mental status is at baseline. Psychiatric:         Mood and Affect: Mood normal.         Behavior: Behavior normal.         Thought Content: Thought content normal.         Judgment: Judgment normal.     /64 (Site: Left Upper Arm, Position: Sitting, Cuff Size: Large Adult)   Pulse 88   Temp 98 °F (36.7 °C) (Tympanic)   SpO2 94%     Assessment:       Diagnosis Orders   1. Periorbital cellulitis of right eye            Plan:    Take bactrim as prescribed for cellulitis around eye. May applycool and warm compresses. If symptoms worsen or do not improve please follow-up with PCP or return to clinic    No orders of the defined types were placed in this encounter. Orders Placed This Encounter   Medications    sulfamethoxazole-trimethoprim (BACTRIM DS) 800-160 MG per tablet     Sig: Take 1 tablet by mouth in the morning and 1 tablet before bedtime. Do all this for 7 days.      Dispense:  14 tablet     Refill:  0      Patient given educational materials - see patient instructions. Discussed use, benefit, and side effects of prescribed medications. All patient questions answered. Pt voiced understanding. Patient agreed with treatment plan. Follow up as directed.      Electronicallysigned by Cara Ramirez MD on 8/3/2022 at 10:23 AM

## 2022-08-05 ENCOUNTER — HOSPITAL ENCOUNTER (OUTPATIENT)
Dept: CARDIAC REHAB | Age: 57
Setting detail: THERAPIES SERIES
Discharge: HOME | End: 2022-08-05
Payer: COMMERCIAL

## 2022-08-05 VITALS — BODY MASS INDEX: 30.09 KG/M2 | WEIGHT: 197.9 LBS

## 2022-08-05 PROCEDURE — 93798 PHYS/QHP OP CAR RHAB W/ECG: CPT

## 2022-08-08 ENCOUNTER — HOSPITAL ENCOUNTER (OUTPATIENT)
Dept: CARDIAC REHAB | Age: 57
Setting detail: THERAPIES SERIES
Discharge: HOME | End: 2022-08-08
Payer: COMMERCIAL

## 2022-08-08 VITALS — WEIGHT: 194.8 LBS | BODY MASS INDEX: 29.62 KG/M2

## 2022-08-08 PROCEDURE — 93798 PHYS/QHP OP CAR RHAB W/ECG: CPT

## 2022-08-10 ENCOUNTER — HOSPITAL ENCOUNTER (OUTPATIENT)
Dept: CARDIAC REHAB | Age: 57
Setting detail: THERAPIES SERIES
Discharge: HOME | End: 2022-08-10
Payer: COMMERCIAL

## 2022-08-10 VITALS — BODY MASS INDEX: 29.8 KG/M2 | WEIGHT: 196 LBS

## 2022-08-10 PROCEDURE — 93798 PHYS/QHP OP CAR RHAB W/ECG: CPT

## 2022-08-12 ENCOUNTER — HOSPITAL ENCOUNTER (OUTPATIENT)
Dept: CARDIAC REHAB | Age: 57
Setting detail: THERAPIES SERIES
Discharge: HOME | End: 2022-08-12
Payer: COMMERCIAL

## 2022-08-12 VITALS — BODY MASS INDEX: 29.66 KG/M2 | WEIGHT: 195.1 LBS

## 2022-08-12 PROCEDURE — 93798 PHYS/QHP OP CAR RHAB W/ECG: CPT

## 2022-08-15 ENCOUNTER — HOSPITAL ENCOUNTER (OUTPATIENT)
Dept: CARDIAC REHAB | Age: 57
Setting detail: THERAPIES SERIES
Discharge: HOME | End: 2022-08-15
Payer: COMMERCIAL

## 2022-08-15 VITALS — WEIGHT: 196.7 LBS | BODY MASS INDEX: 29.91 KG/M2

## 2022-08-15 PROCEDURE — 93798 PHYS/QHP OP CAR RHAB W/ECG: CPT

## 2022-08-15 NOTE — CARDIO/PULMONARY
Goals reviewed w pt. He notes improved strength and endurance since starting Cardiac Rehab. METS continue to improve.

## 2022-08-17 ENCOUNTER — HOSPITAL ENCOUNTER (OUTPATIENT)
Dept: CARDIAC REHAB | Age: 57
Setting detail: THERAPIES SERIES
Discharge: HOME | End: 2022-08-17
Payer: COMMERCIAL

## 2022-08-17 VITALS — BODY MASS INDEX: 29.95 KG/M2 | WEIGHT: 197 LBS

## 2022-08-17 PROCEDURE — 93798 PHYS/QHP OP CAR RHAB W/ECG: CPT

## 2022-08-17 ASSESSMENT — EXERCISE STRESS TEST
PEAK_HR: 139
PEAK_METS: 7.5
PEAK_BP: 124/76
PEAK_RPE: 13

## 2022-08-19 ENCOUNTER — HOSPITAL ENCOUNTER (OUTPATIENT)
Dept: CARDIAC REHAB | Age: 57
Setting detail: THERAPIES SERIES
Discharge: HOME | End: 2022-08-19
Payer: COMMERCIAL

## 2022-08-19 VITALS — WEIGHT: 196.6 LBS | BODY MASS INDEX: 29.89 KG/M2

## 2022-08-19 PROCEDURE — 93798 PHYS/QHP OP CAR RHAB W/ECG: CPT

## 2022-08-19 ASSESSMENT — EXERCISE STRESS TEST
PEAK_HR: 138
PEAK_RPE: 14
PEAK_METS: 7.7
PEAK_BP: 128/70

## 2022-08-22 ENCOUNTER — PATIENT MESSAGE (OUTPATIENT)
Dept: FAMILY MEDICINE CLINIC | Age: 57
End: 2022-08-22

## 2022-08-22 ENCOUNTER — HOSPITAL ENCOUNTER (OUTPATIENT)
Dept: CARDIAC REHAB | Age: 57
Setting detail: THERAPIES SERIES
Discharge: HOME | End: 2022-08-22
Payer: COMMERCIAL

## 2022-08-22 VITALS — WEIGHT: 195.2 LBS | BODY MASS INDEX: 29.68 KG/M2

## 2022-08-22 DIAGNOSIS — E11.9 DIABETES MELLITUS WITHOUT COMPLICATION (HCC): Primary | ICD-10-CM

## 2022-08-22 DIAGNOSIS — E78.5 HYPERLIPIDEMIA, UNSPECIFIED HYPERLIPIDEMIA TYPE: ICD-10-CM

## 2022-08-22 PROCEDURE — 93798 PHYS/QHP OP CAR RHAB W/ECG: CPT

## 2022-08-22 ASSESSMENT — EXERCISE STRESS TEST
PEAK_METS: 10.8
PEAK_BP: 130/64
PEAK_HR: 141
PEAK_RPE: 14

## 2022-08-22 NOTE — TELEPHONE ENCOUNTER
From: Marlon Simon  To: Jerrod Santiago  Sent: 8/22/2022 7:05 AM EDT  Subject: Blood Test    Hello, I have an upcoming appointment on September 8th. Can you please order a full blood draw before my appointment so we can discuss how my heart medication, A1C, and cholesterol levels are doing.    Thank you, Lonny Day

## 2022-08-24 ENCOUNTER — HOSPITAL ENCOUNTER (OUTPATIENT)
Dept: CARDIAC REHAB | Age: 57
Setting detail: THERAPIES SERIES
Discharge: HOME | End: 2022-08-24
Payer: COMMERCIAL

## 2022-08-24 VITALS — BODY MASS INDEX: 29.63 KG/M2 | WEIGHT: 194.9 LBS

## 2022-08-24 PROCEDURE — 93798 PHYS/QHP OP CAR RHAB W/ECG: CPT

## 2022-08-24 ASSESSMENT — EXERCISE STRESS TEST
PEAK_METS: 8.4
PEAK_RPE: 14
PEAK_BP: 116/78
PEAK_HR: 141

## 2022-08-26 ENCOUNTER — HOSPITAL ENCOUNTER (OUTPATIENT)
Dept: CARDIAC REHAB | Age: 57
Setting detail: THERAPIES SERIES
Discharge: HOME | End: 2022-08-26
Payer: COMMERCIAL

## 2022-08-26 VITALS — WEIGHT: 194.6 LBS | BODY MASS INDEX: 29.59 KG/M2

## 2022-08-26 PROCEDURE — 93798 PHYS/QHP OP CAR RHAB W/ECG: CPT

## 2022-08-26 ASSESSMENT — EXERCISE STRESS TEST
PEAK_RPE: 14
PEAK_BP: 102/70
PEAK_HR: 136
PEAK_METS: 10

## 2022-08-29 ENCOUNTER — HOSPITAL ENCOUNTER (OUTPATIENT)
Dept: CARDIAC REHAB | Age: 57
Setting detail: THERAPIES SERIES
Discharge: HOME | End: 2022-08-29
Payer: COMMERCIAL

## 2022-08-29 VITALS — BODY MASS INDEX: 29.35 KG/M2 | WEIGHT: 193 LBS

## 2022-08-29 PROCEDURE — 93798 PHYS/QHP OP CAR RHAB W/ECG: CPT

## 2022-08-29 ASSESSMENT — EXERCISE STRESS TEST
PEAK_HR: 144
PEAK_METS: 8.8
PEAK_RPE: 14
PEAK_BP: 125/66
PEAK_BP: 125/66

## 2022-08-31 ENCOUNTER — HOSPITAL ENCOUNTER (OUTPATIENT)
Dept: CARDIAC REHAB | Age: 57
Setting detail: THERAPIES SERIES
Discharge: HOME | End: 2022-08-31
Payer: COMMERCIAL

## 2022-08-31 VITALS — BODY MASS INDEX: 29.47 KG/M2 | WEIGHT: 193.8 LBS

## 2022-08-31 PROCEDURE — 93798 PHYS/QHP OP CAR RHAB W/ECG: CPT

## 2022-08-31 ASSESSMENT — EXERCISE STRESS TEST
PEAK_BP: 102/60
PEAK_HR: 141
PEAK_METS: 8.5
PEAK_RPE: 13

## 2022-09-01 ENCOUNTER — HOSPITAL ENCOUNTER (OUTPATIENT)
Age: 57
Setting detail: SPECIMEN
Discharge: HOME | End: 2022-09-01

## 2022-09-01 DIAGNOSIS — E11.9 DIABETES MELLITUS WITHOUT COMPLICATION (HCC): ICD-10-CM

## 2022-09-01 DIAGNOSIS — E78.5 HYPERLIPIDEMIA, UNSPECIFIED HYPERLIPIDEMIA TYPE: ICD-10-CM

## 2022-09-01 LAB
ALBUMIN SERPL-MCNC: 4.8 G/DL (ref 3.5–5.2)
ALBUMIN/GLOBULIN RATIO: 2.4 (ref 1–2.5)
ALP BLD-CCNC: 73 U/L (ref 40–129)
ALT SERPL-CCNC: 23 U/L (ref 5–41)
ANION GAP SERPL CALCULATED.3IONS-SCNC: 12 MMOL/L (ref 9–17)
AST SERPL-CCNC: 19 U/L
BILIRUB SERPL-MCNC: 0.7 MG/DL (ref 0.3–1.2)
BUN BLDV-MCNC: 12 MG/DL (ref 6–20)
CALCIUM SERPL-MCNC: 9.7 MG/DL (ref 8.6–10.4)
CHLORIDE BLD-SCNC: 104 MMOL/L (ref 98–107)
CHOLESTEROL/HDL RATIO: 3.5
CHOLESTEROL: 144 MG/DL
CO2: 25 MMOL/L (ref 20–31)
CREAT SERPL-MCNC: 0.91 MG/DL (ref 0.7–1.2)
ESTIMATED AVERAGE GLUCOSE: 134 MG/DL
GFR AFRICAN AMERICAN: >60 ML/MIN
GFR NON-AFRICAN AMERICAN: >60 ML/MIN
GFR SERPL CREATININE-BSD FRML MDRD: ABNORMAL ML/MIN/{1.73_M2}
GLUCOSE BLD-MCNC: 123 MG/DL (ref 70–99)
HBA1C MFR BLD: 6.3 % (ref 4–6)
HDLC SERPL-MCNC: 41 MG/DL
LDL CHOLESTEROL: 81 MG/DL (ref 0–130)
POTASSIUM SERPL-SCNC: 4.7 MMOL/L (ref 3.7–5.3)
SODIUM BLD-SCNC: 141 MMOL/L (ref 135–144)
TOTAL PROTEIN: 6.8 G/DL (ref 6.4–8.3)
TRIGL SERPL-MCNC: 108 MG/DL

## 2022-09-02 ENCOUNTER — HOSPITAL ENCOUNTER (OUTPATIENT)
Dept: CARDIAC REHAB | Age: 57
Setting detail: THERAPIES SERIES
Discharge: HOME | End: 2022-09-02
Payer: COMMERCIAL

## 2022-09-02 VITALS — BODY MASS INDEX: 29.39 KG/M2 | WEIGHT: 193.3 LBS

## 2022-09-02 PROCEDURE — 93798 PHYS/QHP OP CAR RHAB W/ECG: CPT

## 2022-09-02 ASSESSMENT — EXERCISE STRESS TEST
PEAK_HR: 131
PEAK_BP: 128/70
PEAK_METS: 8.9
PEAK_RPE: 13

## 2022-09-07 ENCOUNTER — HOSPITAL ENCOUNTER (OUTPATIENT)
Dept: CARDIAC REHAB | Age: 57
Setting detail: THERAPIES SERIES
Discharge: HOME | End: 2022-09-07
Payer: COMMERCIAL

## 2022-09-07 VITALS — BODY MASS INDEX: 29.56 KG/M2 | WEIGHT: 194.4 LBS

## 2022-09-07 PROCEDURE — 93798 PHYS/QHP OP CAR RHAB W/ECG: CPT

## 2022-09-07 ASSESSMENT — EXERCISE STRESS TEST
PEAK_BP: 132/68
PEAK_RPE: 14
PEAK_METS: 8.9
PEAK_HR: 140

## 2022-09-07 NOTE — CARDIO/PULMONARY
Reviewed discharge paperwork including home exercise outline-copy provided to pt. confidential survey and was given and a tshirt. Explained Phase 3 cardiac rehab in detail. Weekly education provided.

## 2022-09-08 ENCOUNTER — OFFICE VISIT (OUTPATIENT)
Dept: FAMILY MEDICINE CLINIC | Age: 57
End: 2022-09-08
Payer: COMMERCIAL

## 2022-09-08 VITALS
HEART RATE: 71 BPM | SYSTOLIC BLOOD PRESSURE: 110 MMHG | DIASTOLIC BLOOD PRESSURE: 64 MMHG | TEMPERATURE: 97 F | RESPIRATION RATE: 16 BRPM | BODY MASS INDEX: 29.55 KG/M2 | OXYGEN SATURATION: 98 % | WEIGHT: 195 LBS | HEIGHT: 68 IN

## 2022-09-08 DIAGNOSIS — I21.4 NSTEMI (NON-ST ELEVATED MYOCARDIAL INFARCTION) (HCC): ICD-10-CM

## 2022-09-08 DIAGNOSIS — E78.5 HYPERLIPIDEMIA, UNSPECIFIED HYPERLIPIDEMIA TYPE: ICD-10-CM

## 2022-09-08 DIAGNOSIS — E11.9 DIABETES MELLITUS WITHOUT COMPLICATION (HCC): Primary | ICD-10-CM

## 2022-09-08 DIAGNOSIS — Z13.29 THYROID DISORDER SCREENING: ICD-10-CM

## 2022-09-08 DIAGNOSIS — Z12.5 PROSTATE CANCER SCREENING: ICD-10-CM

## 2022-09-08 PROCEDURE — 3044F HG A1C LEVEL LT 7.0%: CPT | Performed by: NURSE PRACTITIONER

## 2022-09-08 PROCEDURE — 99213 OFFICE O/P EST LOW 20 MIN: CPT | Performed by: NURSE PRACTITIONER

## 2022-09-08 ASSESSMENT — ENCOUNTER SYMPTOMS
SHORTNESS OF BREATH: 0
BACK PAIN: 0
ABDOMINAL PAIN: 0
EYE PAIN: 0
COUGH: 0
VOMITING: 0
NAUSEA: 0
SINUS PAIN: 0
DIARRHEA: 0
SORE THROAT: 0

## 2022-09-08 NOTE — PATIENT INSTRUCTIONS
Patient Education        Learning About Type 2 Diabetes  What is type 2 diabetes? Type 2 diabetes is a condition in which you have too much sugar (glucose) in your blood. Glucose is a type of sugar produced in your body when carbohydratesand other foods are digested. It provides energy to cells throughout the body. Normally, blood sugar levels increase after you eat a meal. When blood sugar rises, cells in the pancreas release insulin, which causes the body to absorbsugar from the blood and lowers the blood sugar level to normal.  When you have type 2 diabetes, sugar stays in the blood rather than entering the body's cells to be used for energy. This results in high blood sugar. Ithappens when your body can't use insulin the right way. Over time, high blood sugar can harm many parts of the body, such as your eyes, heart, blood vessels, nerves, and kidneys. It can also increase your risk forother health problems (complications). What can you expect with type 2 diabetes? Jennifer Mario keep hearing about how important it is to keep your blood sugar within a target range. That's because over time, high blood sugar can lead to seriousproblems. It can:  Harm your eyes, nerves, and kidneys. Damage your blood vessels, leading to heart disease and stroke. Reduce blood flow and cause nerve damage to parts of your body, especially your feet. This can cause slow healing and pain when you walk. Make your immune system weak and less able to fight infections. When people hear the word \"diabetes,\" they often think of problems like these. But daily care and treatment can help prevent or delay these problems. The goal is to keep your blood sugar in a target range. That's the best way to reduceyour chance of having more problems from diabetes. What are the symptoms? Some people who have type 2 diabetes may not have any symptoms early on.  Many people with the disease don't even know they have it at first. But with time, diabetes

## 2022-09-08 NOTE — PROGRESS NOTES
Visit Information    Have you changed or started any medications since your last visit including any over-the-counter medicines, vitamins, or herbal medicines? no   Are you having any side effects from any of your medications? -  no  Have you stopped taking any of your medications? Is so, why? -  no    Have you seen any other physician or provider since your last visit? No  Have you had any other diagnostic tests since your last visit? No  Have you been seen in the emergency room and/or had an admission to a hospital since we last saw you? No  Have you had your routine dental cleaning in the past 6 months? yes -     Have you activated your [employer] account? If not, what are your barriers?  Yes     Patient Care Team:  CLARY Chiang CNP as PCP - General (Certified Nurse Practitioner)  CLARY Chiang C[facility]    Medical History Review  Past Medical, Family, and Social History reviewed and does contribute to the patient presenting condition    Health Maintenance   Topic Date Due    COVID-19 Vaccine (1) Never done    Pneumococcal 0-64 years Vaccine (1 - PCV) Never done    HIV screen  Never done    Diabetic microalbuminuria test  Never done    Hepatitis C screen  Never done    Hepatitis B vaccine (1 of 3 - Risk 3-dose series) Never done    Diabetic foot exam  11/22/2020    Flu vaccine (1) 09/01/2022    A1C test (Diabetic or Prediabetic)  12/01/2022    Depression Screen  06/29/2023    Lipids  09/01/2023    Diabetic retinal exam  07/11/2024    DTaP/Tdap/Td vaccine (2 - Td or Tdap) 10/07/2024    Colorectal Cancer Screen  12/05/2024    Shingles vaccine  Completed    Hepatitis A vaccine  Aged Out    Hib vaccine  Aged Out    Meningococcal (ACWY) vaccine  Aged Out

## 2022-09-08 NOTE — PROGRESS NOTES
[facility]  [address]  [facility] [facility] [address]  Dept: [phone]  Dept Fax: [phone]    Stacey Dhaliwal is a 61 y.o. male who presents today for his medicalconditions/complaints as noted below. Stacey Dhaliwal is c/o of Diabetes      HPI:     48-year-old male patient presents with complaints of follow up     Hx of NSTEMI, subsequent cath with stent placed per Dr. Brandyn Salazar. Was started on brilinta, aspirin, lisinopril, coreg. BP and HR stable. Has been following with [facility] clinic cardiology. Continuing to attend cardiac rehab. History of type 2 diabetes. Currently taking Metformin 2000 daily. Last a1c 7.5, decreased to 6.3. Has been actively working on diet and activity. Weight stable. Hyperlipidemia, CAD currently managed with atorvastatin 40, last lipid level stable, and liver enzymes stable. Past Medical History:   Diagnosis Date    DM (diabetes mellitus) ([facility] [address]     Hyperlipidemia         Current Outpatient Medications   Medication Sig Dispense Refill    metFORMIN (GLUCOPHAGE-XR) 500 MG extended release tablet Take 4 tablets by mouth daily (with breakfast) 360 tablet 1    ticagrelor (BRILINTA) 90 MG TABS tablet Take 1 tablet by mouth 2 times daily 180 tablet 1    lisinopril (PRINIVIL;ZESTRIL) 5 MG tablet Take 1 tablet by mouth daily 90 tablet 1    carvedilol (COREG) 6.25 MG tablet Take 1 tablet by mouth 2 times daily (with meals) 180 tablet 1    atorvastatin (LIPITOR) 40 MG tablet Take 1 tablet by mouth nightly 90 tablet 1    nitroGLYCERIN (NITROSTAT) 0.4 MG SL tablet up to max of 3 total doses.  If no relief after 1 dose, call 911. 25 tablet 3    aspirin 81 MG EC tablet Take 1 tablet by mouth daily 30 tablet 3    Respiratory Therapy Supplies KEILY 1 Device by Does not apply route nightly NASAL CPAP per recommendations of sleep medicine physician Dx : severe sleep apnea 1 Device 0    glucose monitoring kit (FREESTYLE) monitoring kit 1 kit by Does not apply route daily (Patient not taking: Reported on 9/8/2022) 1 kit 0    Lancets MISC 1 each by Does not apply route daily (Patient not taking: Reported on 9/8/2022) 100 each 5     No current facility-administered medications for this visit. No Known Allergies    Subjective:      Review of Systems   Constitutional:  Negative for chills and fatigue. HENT:  Negative for congestion, ear pain, sinus pain and sore throat. Eyes:  Negative for pain and visual disturbance. Respiratory:  Negative for cough and shortness of breath. Cardiovascular:  Negative for chest pain and palpitations. Gastrointestinal:  Negative for abdominal pain, diarrhea, nausea and vomiting. Genitourinary:  Negative for penile pain and testicular pain. Musculoskeletal:  Negative for back pain, joint swelling and neck pain. Skin:  Negative for rash. Neurological:  Negative for dizziness and light-headedness. Hematological:  Does not bruise/bleed easily. All other systems reviewed and are negative.    :Objective     Physical Exam  Vitals and nursing note reviewed. Constitutional:       Appearance: Normal appearance. Cardiovascular:      Rate and Rhythm: Normal rate. Pulmonary:      Effort: Pulmonary effort is normal.      Breath sounds: Normal breath sounds. Skin:     General: Skin is warm and dry. Neurological:      General: No focal deficit present. Mental Status: He is alert and oriented to person, place, and time.      /64 (Site: Right Upper Arm, Position: Sitting, Cuff Size: Medium Adult)   Pulse 71   Temp 97 °F (36.1 °C) (Tympanic)   Resp 16   Ht 5' 8\" (1.727 m)   Wt 195 lb (88.5 kg)   SpO2 98%   BMI 29.65 kg/m²     Lab Review   Hospital Outpatient Visit on 09/01/2022   Component Date Value    Glucose 09/01/2022 123 (A)    BUN 09/01/2022 12     Creatinine 09/01/2022 0.91     Calcium 09/01/2022 9.7     Sodium 09/01/2022 141     Potassium 09/01/2022 4.7 Chloride 09/01/2022 104     CO2 09/01/2022 25     Anion Gap 09/01/2022 12     Alkaline Phosphatase 09/01/2022 73     ALT 09/01/2022 23     AST 09/01/2022 19     Total Bilirubin 09/01/2022 0.7     Total Protein 09/01/2022 6.8     Albumin 09/01/2022 4.8     Albumin/Globulin Ratio 09/01/2022 2.4     GFR Non- 09/01/2022 >60     GFR  09/01/2022 >60     GFR Comment 09/01/2022          Hemoglobin A1C 09/01/2022 6.3 (A)    Estimated Avg Glucose 09/01/2022 134     Cholesterol 09/01/2022 144     HDL 09/01/2022 41     LDL Cholesterol 09/01/2022 81     Chol/HDL Ratio 09/01/2022 3.5     Triglycerides 09/01/2022 108    Orders Only on 07/12/2022   Component Date Value    Diabetic Retinopathy 07/11/2022 Negative    Hospital Outpatient Visit on 07/08/2022   Component Date Value    Glucose 07/08/2022 228 (A)    BUN 07/08/2022 16     Creatinine 07/08/2022 1.03     Calcium 07/08/2022 9.8     Sodium 07/08/2022 136     Potassium 07/08/2022 4.5     Chloride 07/08/2022 98     CO2 07/08/2022 24     Anion Gap 07/08/2022 14     GFR Non- 07/08/2022 >60     GFR  07/08/2022 >60     GFR Comment 07/08/2022         Admission on 06/17/2022, Discharged on 06/19/2022   Component Date Value    Ventricular Rate 06/17/2022 77     Atrial Rate 06/17/2022 77     P-R Interval 06/17/2022 150     QRS Duration 06/17/2022 96     Q-T Interval 06/17/2022 364     QTc Calculation (Bazett) 06/17/2022 411     P Axis 06/17/2022 55     R Axis 06/17/2022 46     T Axis 06/17/2022 42     WBC 06/17/2022 7.7     RBC 06/17/2022 5.23     Hemoglobin 06/17/2022 15.7     Hematocrit 06/17/2022 46.4     MCV 06/17/2022 88.7     MCH 06/17/2022 30.0     MCHC 06/17/2022 33.8     RDW 06/17/2022 12.0     Platelets 22/40/7812 201     MPV 06/17/2022 10.9     NRBC Automated 06/17/2022 0.0     Seg Neutrophils 06/17/2022 63     Lymphocytes 06/17/2022 25     Monocytes 06/17/2022 9     Eosinophils % 06/17/2022 1     Basophils 06/17/2022 1     Immature Granulocytes 06/17/2022 1 (A)    Segs Absolute 06/17/2022 4.84     Absolute Lymph # 06/17/2022 1.94     Absolute Mono # 06/17/2022 0.68     Absolute Eos # 06/17/2022 0.10     Basophils Absolute 06/17/2022 0.06     Absolute Immature Granul* 06/17/2022 0.04     Glucose 06/17/2022 204 (A)    BUN 06/17/2022 13     Creatinine 06/17/2022 0.84     Bun/Cre Ratio 06/17/2022 15     Calcium 06/17/2022 9.3     Sodium 06/17/2022 142     Potassium 06/17/2022 4.2     Chloride 06/17/2022 103     CO2 06/17/2022 25     Anion Gap 06/17/2022 14     GFR Non- 06/17/2022 >60     GFR  06/17/2022 >60     GFR Comment 06/17/2022          Troponin, High Sensitivi* 06/17/2022 29 (A)    Troponin, High Sensitivi* 06/17/2022 101 (A)    Pro-BNP 06/17/2022 99     Ventricular Rate 06/17/2022 80     Atrial Rate 06/17/2022 80     P-R Interval 06/17/2022 154     QRS Duration 06/17/2022 94     Q-T Interval 06/17/2022 380     QTc Calculation (Bazett) 06/17/2022 438     P Axis 06/17/2022 59     R Axis 06/17/2022 47     T Axis 06/17/2022 54     Protime 06/17/2022 12.6     INR 06/17/2022 0.9     PTT 06/17/2022 24.8     Troponin, High Sensitivi* 06/17/2022 283 (A)    Left Ventricular Ejectio* 06/17/2022 53     LVEF MODALITY 06/17/2022 ECHO     WBC 06/17/2022 9.3     RBC 06/17/2022 5.04     Hemoglobin 06/17/2022 15.1     Hematocrit 06/17/2022 44.7     MCV 06/17/2022 88.7     MCH 06/17/2022 30.0     MCHC 06/17/2022 33.8     RDW 06/17/2022 12.1     Platelets 83/53/8062 208     MPV 06/17/2022 10.8     NRBC Automated 06/17/2022 0.0     PTT 06/17/2022 28.0     Protime 06/17/2022 13.5     INR 06/17/2022 1.0     Left Ventricular Ejectio* 06/17/2022 40     LVEF MODALITY 06/17/2022 CATH     Anti-XA Unfrac Heparin 06/18/2022 <0.10 (A)    Ventricular Rate 06/17/2022 66     Atrial Rate 06/17/2022 66     P-R Interval 06/17/2022 146     QRS Duration 06/17/2022 94     Q-T Interval 06/17/2022 396     QTc Calculation (Bazett) 06/17/2022 415     P Axis 06/17/2022 39     R Hurley 06/17/2022 27     T Axis 06/17/2022 23     Glucose 06/18/2022 175 (A)    BUN 06/18/2022 9     Creatinine 06/18/2022 0.81     Bun/Cre Ratio 06/18/2022 11     Calcium 06/18/2022 8.9     Sodium 06/18/2022 138     Potassium 06/18/2022 3.7     Chloride 06/18/2022 103     CO2 06/18/2022 25     Anion Gap 06/18/2022 10     Alkaline Phosphatase 06/18/2022 74     ALT 06/18/2022 29     AST 06/18/2022 52 (A)    Total Bilirubin 06/18/2022 0.84     Total Protein 06/18/2022 6.4     Albumin 06/18/2022 4.3     GFR Non- 06/18/2022 >60     GFR  06/18/2022 >60     GFR Comment 06/18/2022          Magnesium 06/18/2022 2.0     Cholesterol 06/18/2022 152     HDL 06/18/2022 35 (A)    LDL Cholesterol 06/18/2022 78     Chol/HDL Ratio 06/18/2022 4.3     Triglycerides 06/18/2022 193 (A)    WBC 06/18/2022 9.7     RBC 06/18/2022 4.89     Hemoglobin 06/18/2022 14.6     Hematocrit 06/18/2022 42.9     MCV 06/18/2022 87.7     MCH 06/18/2022 29.9     MCHC 06/18/2022 34.0     RDW 06/18/2022 12.1     Platelets 91/55/2422 186     MPV 06/18/2022 10.6     NRBC Automated 06/18/2022 0.0     Anti-XA Unfrac Heparin 06/18/2022 <0.10 (A)    Ventricular Rate 06/18/2022 73     Atrial Rate 06/18/2022 73     P-R Interval 06/18/2022 150     QRS Duration 06/18/2022 98     Q-T Interval 06/18/2022 380     QTc Calculation (Bazett) 06/18/2022 418     P Axis 06/18/2022 57     R Axis 06/18/2022 63     T Hurley 06/18/2022 64     Anti-XA Unfrac Heparin 06/18/2022 <0.10 (A)    Anti-XA Unfrac Heparin 06/18/2022 <0.10 (A)    POC Glucose 06/18/2022 188 (A)    Anti-XA Unfrac Heparin 06/19/2022 <0.10 (A)    POC Glucose 06/18/2022 158 (A)    POC Glucose 06/18/2022 159 (A)    Anti-XA Unfrac Heparin 06/19/2022 <0.10 (A)    WBC 06/19/2022 8.5     RBC 06/19/2022 5.16     Hemoglobin 06/19/2022 15.1     Hematocrit 06/19/2022 45.2     MCV 06/19/2022 87.6     MCH 06/19/2022 29.3     MCHC 06/19/2022 33.4     RDW 06/19/2022 11.9     Platelets 79/47/3404 190     MPV 06/19/2022 10.6     NRBC Automated 06/19/2022 0.0     POC Glucose 06/19/2022 164 (A)    POC Glucose 06/19/2022 265 (A)   Hospital Outpatient Visit on 06/01/2022   Component Date Value    Hemoglobin A1C 06/01/2022 7.5 (A)    Estimated Avg Glucose 06/01/2022 169     PSA 06/01/2022 1.12     Cholesterol 06/01/2022 177     HDL 06/01/2022 40 (A)    LDL Cholesterol 06/01/2022 109     Chol/HDL Ratio 06/01/2022 4.4     Triglycerides 06/01/2022 138     TSH 06/01/2022 2.81     Glucose 06/01/2022 170 (A)    BUN 06/01/2022 17     Creatinine 06/01/2022 0.80     Calcium 06/01/2022 9.4     Sodium 06/01/2022 137     Potassium 06/01/2022 4.1     Chloride 06/01/2022 101     CO2 06/01/2022 25     Anion Gap 06/01/2022 11     Alkaline Phosphatase 06/01/2022 77     ALT 06/01/2022 26     AST 06/01/2022 17     Total Bilirubin 06/01/2022 0.63     Total Protein 06/01/2022 6.8     Albumin 06/01/2022 4.7     Albumin/Globulin Ratio 06/01/2022 2.2     GFR Non- 06/01/2022 >60     GFR  06/01/2022 >60     GFR Comment 06/01/2022          WBC 06/01/2022 6.1     RBC 06/01/2022 5.15     Hemoglobin 06/01/2022 15.5     Hematocrit 06/01/2022 45.5     MCV 06/01/2022 88.3     MCH 06/01/2022 30.1     MCHC 06/01/2022 34.1     RDW 06/01/2022 12.2     Platelets 09/19/9821 218     MPV 06/01/2022 11.3     NRBC Automated 06/01/2022 0.0     Seg Neutrophils 06/01/2022 58     Lymphocytes 06/01/2022 27     Monocytes 06/01/2022 11     Eosinophils % 06/01/2022 2     Basophils 06/01/2022 1     Immature Granulocytes 06/01/2022 1 (A)    Segs Absolute 06/01/2022 3.63     Absolute Lymph # 06/01/2022 1.63     Absolute Mono # 06/01/2022 0.65     Absolute Eos # 06/01/2022 0.12     Basophils Absolute 06/01/2022 0.06     Absolute Immature Granul* 06/01/2022 0.04        Assessment and Plan      1.  Diabetes mellitus without complication ([facility] [address]  -     Comprehensive Metabolic Panel; Future  -     Hemoglobin A1C; Future  -     CBC with Auto Differential; Future  -     Microalbumin, Ur; Future  2. Hyperlipidemia, unspecified hyperlipidemia type  -     Comprehensive Metabolic Panel; Future  -     Lipid Panel; Future  -     CBC with Auto Differential; Future  3. Prostate cancer screening  -     PSA Screening; Future  -     CBC with Auto Differential; Future  4. Thyroid disorder screening  -     TSH With Reflex Ft4; Future  -     CBC with Auto Differential; Future  5. NSTEMI (non-ST elevated myocardial infarction) ([facility] [address]  -     CBC with Auto Differential; Future       Recent labs reviewed  Continue f/u with cardiology  Med check 6 months,          No results found for this visit on 09/08/22. Return in about 6 months (around 3/8/2023), or if symptoms worsen or fail to improve. No orders of the defined types were placed in this encounter. Patient given educational materials - see patient instructions. Discussed use, benefit, and side effects of prescribed medications. All patientquestions answered. Pt voiced understanding. Patient given educational materials - see patient instructions. Discussed use, benefit, and side effects of prescribed medications. All patientquestions answered. Pt voiced understanding. This note was transcribed using dictation with Dragon services. Efforts were made to correct any errors but some words may be misinterpreted.     Patient assumes risks associated with failure to complete recommended testing and treatments in a timely manner    Electronically signed by CLARY Flores CNP on 9/8/2022at 8:13 AM

## 2022-09-09 ENCOUNTER — HOSPITAL ENCOUNTER (OUTPATIENT)
Dept: CARDIAC REHAB | Age: 57
Setting detail: THERAPIES SERIES
Discharge: HOME | End: 2022-09-09
Payer: COMMERCIAL

## 2022-09-09 VITALS — BODY MASS INDEX: 29.21 KG/M2 | WEIGHT: 192.1 LBS

## 2022-09-09 PROCEDURE — 93798 PHYS/QHP OP CAR RHAB W/ECG: CPT

## 2022-09-09 ASSESSMENT — EXERCISE STRESS TEST
PEAK_METS: 8.7
PEAK_BP: 110/70
PEAK_RPE: 14
PEAK_HR: 141

## 2022-09-09 NOTE — PROGRESS NOTES
POSSIBLE ST DEPRESSION NOTED UPON ARRIVAL, DENIES ANY TYPE OF SYMPTOMS. LAST FEW VISITS NOTED POSSIBLE ST DEPRESSION WITH EXERCISE. EXTRA STRIPS POSTED AND FAXED TO DR Rafa Butcher. PT UPDATED AND EXPLAINED THAT HE MAY RECEIVE A CALL FROM THE OFFICE. PT DENIES ANY SYMPTOMS AND STATES \"I ACTUALLY FEEL GREAT. \"

## 2022-09-12 ENCOUNTER — HOSPITAL ENCOUNTER (OUTPATIENT)
Dept: CARDIAC REHAB | Age: 57
Setting detail: THERAPIES SERIES
Discharge: HOME | End: 2022-09-12
Payer: COMMERCIAL

## 2022-09-12 VITALS — WEIGHT: 192.6 LBS | BODY MASS INDEX: 29.28 KG/M2

## 2022-09-12 PROCEDURE — 93798 PHYS/QHP OP CAR RHAB W/ECG: CPT

## 2022-09-12 ASSESSMENT — EXERCISE STRESS TEST
PEAK_BP: 112/72
PEAK_RPE: 14
PEAK_HR: 141
PEAK_METS: 9.4

## 2022-09-12 NOTE — CARDIO/PULMONARY
Received fax back from Dr. Wes Corrales with strips. He has reviewed and notes that \"he does not see significant ST depression. Continue conservative management\" I faxed todays strips for review as well.

## 2022-09-14 ENCOUNTER — HOSPITAL ENCOUNTER (OUTPATIENT)
Dept: CARDIAC REHAB | Age: 57
Setting detail: THERAPIES SERIES
Discharge: HOME | End: 2022-09-14
Payer: COMMERCIAL

## 2022-09-14 VITALS — BODY MASS INDEX: 29.39 KG/M2 | WEIGHT: 193.3 LBS

## 2022-09-14 PROCEDURE — 93798 PHYS/QHP OP CAR RHAB W/ECG: CPT

## 2022-09-14 ASSESSMENT — PATIENT HEALTH QUESTIONNAIRE - PHQ9
SUM OF ALL RESPONSES TO PHQ QUESTIONS 1-9: 0
2. FEELING DOWN, DEPRESSED OR HOPELESS: 0
SUM OF ALL RESPONSES TO PHQ QUESTIONS 1-9: 0
1. LITTLE INTEREST OR PLEASURE IN DOING THINGS: 0
SUM OF ALL RESPONSES TO PHQ QUESTIONS 1-9: 0
SUM OF ALL RESPONSES TO PHQ QUESTIONS 1-9: 0
SUM OF ALL RESPONSES TO PHQ9 QUESTIONS 1 & 2: 0

## 2022-09-14 ASSESSMENT — EXERCISE STRESS TEST
PEAK_BP: 108/60
PEAK_HR: 147
PEAK_METS: 9.4
PEAK_RPE: 14

## 2022-09-14 NOTE — CARDIO/PULMONARY
Pt completed 30/30 visits. ITP updated, PHQ completed, medications reviewed and updated as needed. Pt is considering participating in Phase 3 cardiac rehab.

## 2022-09-30 ENCOUNTER — HOSPITAL ENCOUNTER (EMERGENCY)
Age: 57
Discharge: HOME | End: 2022-09-30
Attending: EMERGENCY MEDICINE
Payer: COMMERCIAL

## 2022-09-30 ENCOUNTER — APPOINTMENT (OUTPATIENT)
Dept: GENERAL RADIOLOGY | Age: 57
End: 2022-09-30
Payer: COMMERCIAL

## 2022-09-30 VITALS
HEART RATE: 60 BPM | RESPIRATION RATE: 13 BRPM | OXYGEN SATURATION: 95 % | HEIGHT: 68 IN | DIASTOLIC BLOOD PRESSURE: 70 MMHG | TEMPERATURE: 97.7 F | BODY MASS INDEX: 28.79 KG/M2 | SYSTOLIC BLOOD PRESSURE: 96 MMHG | WEIGHT: 190 LBS

## 2022-09-30 DIAGNOSIS — R07.89 ATYPICAL CHEST PAIN: Primary | ICD-10-CM

## 2022-09-30 LAB
ABSOLUTE EOS #: 0.12 K/UL (ref 0–0.44)
ABSOLUTE IMMATURE GRANULOCYTE: 0.03 K/UL (ref 0–0.3)
ABSOLUTE LYMPH #: 1.72 K/UL (ref 1.1–3.7)
ABSOLUTE MONO #: 0.75 K/UL (ref 0.1–1.2)
ANION GAP SERPL CALCULATED.3IONS-SCNC: 11 MMOL/L (ref 9–17)
BASOPHILS # BLD: 1 % (ref 0–2)
BASOPHILS ABSOLUTE: 0.07 K/UL (ref 0–0.2)
BUN BLDV-MCNC: 14 MG/DL (ref 6–20)
BUN/CREAT BLD: 16 (ref 9–20)
CALCIUM SERPL-MCNC: 9.2 MG/DL (ref 8.6–10.4)
CHLORIDE BLD-SCNC: 107 MMOL/L (ref 98–107)
CO2: 26 MMOL/L (ref 20–31)
CREAT SERPL-MCNC: 0.85 MG/DL (ref 0.7–1.2)
EKG ATRIAL RATE: 67 BPM
EKG P AXIS: 67 DEGREES
EKG P-R INTERVAL: 152 MS
EKG Q-T INTERVAL: 380 MS
EKG QRS DURATION: 92 MS
EKG QTC CALCULATION (BAZETT): 401 MS
EKG R AXIS: 68 DEGREES
EKG T AXIS: 60 DEGREES
EKG VENTRICULAR RATE: 67 BPM
EOSINOPHILS RELATIVE PERCENT: 2 % (ref 1–4)
GFR AFRICAN AMERICAN: >60 ML/MIN
GFR NON-AFRICAN AMERICAN: >60 ML/MIN
GFR SERPL CREATININE-BSD FRML MDRD: ABNORMAL ML/MIN/{1.73_M2}
GLUCOSE BLD-MCNC: 146 MG/DL (ref 70–99)
HCT VFR BLD CALC: 45.6 % (ref 40.7–50.3)
HEMOGLOBIN: 15 G/DL (ref 13–17)
IMMATURE GRANULOCYTES: 0 %
LYMPHOCYTES # BLD: 23 % (ref 24–43)
MCH RBC QN AUTO: 29.7 PG (ref 25.2–33.5)
MCHC RBC AUTO-ENTMCNC: 32.9 G/DL (ref 28.4–34.8)
MCV RBC AUTO: 90.3 FL (ref 82.6–102.9)
MONOCYTES # BLD: 10 % (ref 3–12)
NRBC AUTOMATED: 0 PER 100 WBC
PDW BLD-RTO: 12.4 % (ref 11.8–14.4)
PLATELET # BLD: 216 K/UL (ref 138–453)
PMV BLD AUTO: 10.6 FL (ref 8.1–13.5)
POTASSIUM SERPL-SCNC: 4 MMOL/L (ref 3.7–5.3)
RBC # BLD: 5.05 M/UL (ref 4.21–5.77)
SEG NEUTROPHILS: 64 % (ref 36–65)
SEGMENTED NEUTROPHILS ABSOLUTE COUNT: 4.88 K/UL (ref 1.5–8.1)
SODIUM BLD-SCNC: 144 MMOL/L (ref 135–144)
TROPONIN, HIGH SENSITIVITY: 8 NG/L (ref 0–22)
TROPONIN, HIGH SENSITIVITY: 8 NG/L (ref 0–22)
WBC # BLD: 7.6 K/UL (ref 3.5–11.3)

## 2022-09-30 PROCEDURE — 84484 ASSAY OF TROPONIN QUANT: CPT

## 2022-09-30 PROCEDURE — 71045 X-RAY EXAM CHEST 1 VIEW: CPT

## 2022-09-30 PROCEDURE — 99285 EMERGENCY DEPT VISIT HI MDM: CPT

## 2022-09-30 PROCEDURE — 85025 COMPLETE CBC W/AUTO DIFF WBC: CPT

## 2022-09-30 PROCEDURE — 6370000000 HC RX 637 (ALT 250 FOR IP): Performed by: EMERGENCY MEDICINE

## 2022-09-30 PROCEDURE — 80048 BASIC METABOLIC PNL TOTAL CA: CPT

## 2022-09-30 PROCEDURE — 93010 ELECTROCARDIOGRAM REPORT: CPT | Performed by: INTERNAL MEDICINE

## 2022-09-30 PROCEDURE — 93005 ELECTROCARDIOGRAM TRACING: CPT | Performed by: EMERGENCY MEDICINE

## 2022-09-30 RX ORDER — ISOSORBIDE MONONITRATE 30 MG/1
30 TABLET, EXTENDED RELEASE ORAL DAILY
Status: DISCONTINUED | OUTPATIENT
Start: 2022-09-30 | End: 2022-09-30 | Stop reason: HOSPADM

## 2022-09-30 RX ORDER — ISOSORBIDE MONONITRATE 30 MG/1
30 TABLET, EXTENDED RELEASE ORAL DAILY
Qty: 15 TABLET | Refills: 0 | Status: SHIPPED | OUTPATIENT
Start: 2022-09-30

## 2022-09-30 RX ORDER — ASPIRIN 81 MG/1
243 TABLET, CHEWABLE ORAL ONCE
Status: COMPLETED | OUTPATIENT
Start: 2022-09-30 | End: 2022-09-30

## 2022-09-30 RX ADMIN — ASPIRIN 81 MG 243 MG: 81 TABLET ORAL at 05:17

## 2022-09-30 ASSESSMENT — PAIN SCALES - GENERAL
PAINLEVEL_OUTOF10: 0
PAINLEVEL_OUTOF10: 1

## 2022-09-30 ASSESSMENT — ENCOUNTER SYMPTOMS
CHEST TIGHTNESS: 1
ABDOMINAL DISTENTION: 0
CONSTIPATION: 0
SHORTNESS OF BREATH: 0
VOMITING: 0
SINUS PAIN: 0
DIARRHEA: 0
APNEA: 0
COLOR CHANGE: 0
EYES NEGATIVE: 1

## 2022-09-30 ASSESSMENT — PAIN - FUNCTIONAL ASSESSMENT: PAIN_FUNCTIONAL_ASSESSMENT: 0-10

## 2022-09-30 NOTE — ED NOTES
Pt states that he thinks that the aspirin helped because he is no longer experiencing the heartburn. Dr. Emilio Gutierrez notified.       Freida Mike RN  09/30/22 [id number]

## 2022-09-30 NOTE — ED PROVIDER NOTES
EMERGENCY DEPARTMENT ENCOUNTER    Pt Name: Valery Mon  MRN: [id number]  [place] 1965  Date of evaluation: 9/30/22  CHIEF COMPLAINT       Chief Complaint   Patient presents with    Chest Pain     Pt states it feels like heartburn, started yesterday, slight relief from x1 nitro, hx of stent placement x3 months ago     HISTORY OF PRESENT ILLNESS   53-year-old male presents emergency room for chest discomfort. He reports a chest pressure. Pain started yesterday. Patient had NSTEMI in June of this year. He had 2 stents placed in the LAD. He has not had any major issues since then. Patient had taken a nitro at home with some improvement of the pain. He is on aspirin and Brilinta and has overall been compliant with his medications as well as his other meds. Patient does not have any shortness of breath. REVIEW OF SYSTEMS     Review of Systems   Constitutional:  Negative for activity change, chills and diaphoresis. HENT:  Negative for congestion, sinus pain and tinnitus. Eyes: Negative. Respiratory:  Positive for chest tightness. Negative for apnea and shortness of breath. Gastrointestinal:  Negative for abdominal distention, constipation, diarrhea and vomiting. Genitourinary:  Negative for difficulty urinating and frequency. Musculoskeletal:  Negative for arthralgias and myalgias. Skin:  Negative for color change and rash. Neurological:  Negative for dizziness. Hematological: Negative. Psychiatric/Behavioral: Negative.        PASTMEDICAL HISTORY     Past Medical History:   Diagnosis Date    DM (diabetes mellitus) ([facility] [address]     Hyperlipidemia      Past Problem List  Patient Active Problem List   Diagnosis Code    Diabetes mellitus without complication ([facility] [address] A06.2    NSTEMI (non-ST elevated myocardial infarction) ([facility] [address] I21.4    Hyperlipidemia E78.5     SURGICAL HISTORY       Past Surgical History:   Procedure Laterality Date    COLONOSCOPY      CORONARY ANGIOPLASTY WITH STENT PLACEMENT       CURRENT MEDICATIONS       Previous Medications    ASPIRIN 81 MG EC TABLET    Take 1 tablet by mouth daily    ATORVASTATIN (LIPITOR) 40 MG TABLET    Take 1 tablet by mouth nightly    CARVEDILOL (COREG) 6.25 MG TABLET    Take 1 tablet by mouth 2 times daily (with meals)    GLUCOSE MONITORING KIT (FREESTYLE) MONITORING KIT    1 kit by Does not apply route daily    LANCETS MISC    1 each by Does not apply route daily    LISINOPRIL (PRINIVIL;ZESTRIL) 5 MG TABLET    Take 1 tablet by mouth daily    METFORMIN (GLUCOPHAGE-XR) 500 MG EXTENDED RELEASE TABLET    Take 4 tablets by mouth daily (with breakfast)    NITROGLYCERIN (NITROSTAT) 0.4 MG SL TABLET    up to max of 3 total doses. If no relief after 1 dose, call 911. RESPIRATORY THERAPY SUPPLIES KEILY    1 Device by Does not apply route nightly NASAL CPAP per recommendations of sleep medicine physician Dx : severe sleep apnea    TICAGRELOR (BRILINTA) 90 MG TABS TABLET    Take 1 tablet by mouth 2 times daily     ALLERGIES     has No Known Allergies. FAMILY HISTORY     He indicated that his mother is alive. He indicated that his father is alive. SOCIAL HISTORY       Social History     Tobacco Use    Smoking status: Never    Smokeless tobacco: Never   Vaping Use    Vaping Use: Never used   Substance Use Topics    Alcohol use: Yes     Alcohol/week: 2.0 standard drinks     Types: 2 Cans of beer per week     Comment: beer once a month, glass of wine twice a week    Drug use: No     PHYSICAL EXAM     INITIAL VITALS: /69   Pulse 66   Temp 97.7 °F (36.5 °C) (Oral)   Resp 13   Ht 5' 8\" (1.727 m)   Wt 190 lb (86.2 kg)   SpO2 94%   BMI 28.89 kg/m²    Physical Exam  Constitutional:       General: He is not in acute distress. Appearance: He is well-developed. HENT:      Head: Normocephalic. Eyes:      Pupils: Pupils are equal, round, and reactive to light. Cardiovascular:      Rate and Rhythm: Normal rate and regular rhythm.       Heart sounds: Normal heart sounds. Pulmonary:      Effort: Pulmonary effort is normal. No respiratory distress. Breath sounds: Normal breath sounds. Abdominal:      General: Bowel sounds are normal.      Palpations: Abdomen is soft. Tenderness: There is no abdominal tenderness. Musculoskeletal:         General: Normal range of motion. Skin:     General: Skin is warm and dry. Neurological:      Mental Status: He is alert and oriented to person, place, and time. MEDICAL DECISION MAKING:     Alert oriented nondistressed 28-year-old male presenting to the emergency room for chest discomfort. Patient does not have any significant EKG changes. Cardiac enzymes are unchanged at 2-hour rebel are and are within normal limits. Plan is for outpatient follow-up. Patient given return precautions. ED Course as of 10/03/22 1919   Fri Sep 30, 2022   0701 Case discussed with cardiology attending Dr. Guanakito Addison. Plan is to repeat cardiac enzymes if unchanged patient can be discharged with cardiology follow-up. Plan is for Imdur for home. [EG]      ED Course User Index  [EG] Darvin Duarte MD       CRITICAL CARE:       PROCEDURES:    Procedures    DIAGNOSTIC RESULTS   EKG:All EKG's are interpreted by the Emergency Department Physician who either signs or Co-signs this chart in the absence of a cardiologist.    EKG sinus rhythm ventricular rate 67  No significant ST or T wave changes    QTc 401    RADIOLOGY:All plain film, CT, MRI, and formal ultrasound images (except ED bedside ultrasound) are read by the radiologist, see reports below, unless otherwisenoted in MDM or here. No orders to display     LABS: All lab results were reviewed by myself, and all abnormals are listed below.   Labs Reviewed   CBC WITH AUTO DIFFERENTIAL - Abnormal; Notable for the following components:       Result Value    Lymphocytes 23 (*)     All other components within normal limits   BASIC METABOLIC PANEL   TROPONIN EMERGENCY DEPARTMENTCOURSE:         Vitals:    Vitals:    09/30/22 0450 09/30/22 0515   BP: 109/80 106/69   Pulse: 78 66   Resp: 16 13   Temp: 97.7 °F (36.5 °C)    TempSrc: Oral    SpO2: 97% 94%   Weight: 190 lb (86.2 kg)    Height: 5' 8\" (1.727 m)        The patient was given the following medications while in the emergency department:  Orders Placed This Encounter   Medications    aspirin chewable tablet 243 mg     CONSULTS:  None    FINAL IMPRESSION    No diagnosis found. DISPOSITION/PLAN   DISPOSITION        PATIENT REFERRED TO:  No follow-up provider specified. DISCHARGE MEDICATIONS:  New Prescriptions    No medications on file     Vitor Gray MD  Attending Emergency Physician      Care during this encounter was due to an unprecedented national emergency due to COVID-19.              Sun Hayden MD  09/30/22 [address] [facility] [address], [place]  10/03/22 1022

## 2022-09-30 NOTE — ED NOTES
The following labs labeled with pt sticker and sent to Lab:     [] Haily Fall     [] Blue   [x] Green/yellow  [] Drk Green/Grey   [] Pink  [] Red  [] Yellow     [] Blood Cultures     [] COVID-19 swab    [] Rapid   [] Viral Swab  [] Wound Swab    [] Urine Sample  [] Pelvic Cultures               Vish Blanchard RN  09/30/22 7767

## 2022-09-30 NOTE — DISCHARGE INSTRUCTIONS
Start Imdur take daily. Please follow-up with cardiology within the next 1 week. If you have any new or worsening symptoms I recommend reevaluation immediately.

## 2022-10-22 DIAGNOSIS — I21.4 NSTEMI (NON-ST ELEVATED MYOCARDIAL INFARCTION) (HCC): Primary | ICD-10-CM

## 2022-10-24 RX ORDER — ASPIRIN 81 MG/1
TABLET, COATED ORAL
Qty: 30 TABLET | Refills: 3 | OUTPATIENT
Start: 2022-10-24

## 2022-10-24 RX ORDER — ASPIRIN 81 MG/1
81 TABLET ORAL DAILY
Qty: 90 TABLET | Refills: 1 | Status: SHIPPED | OUTPATIENT
Start: 2022-10-24

## 2023-03-07 ENCOUNTER — HOSPITAL ENCOUNTER (OUTPATIENT)
Age: 58
Setting detail: SPECIMEN
Discharge: HOME | End: 2023-03-07

## 2023-03-07 DIAGNOSIS — Z13.29 THYROID DISORDER SCREENING: ICD-10-CM

## 2023-03-07 DIAGNOSIS — I21.4 NSTEMI (NON-ST ELEVATED MYOCARDIAL INFARCTION) (HCC): ICD-10-CM

## 2023-03-07 DIAGNOSIS — E11.9 DIABETES MELLITUS WITHOUT COMPLICATION (HCC): ICD-10-CM

## 2023-03-07 DIAGNOSIS — E78.5 HYPERLIPIDEMIA, UNSPECIFIED HYPERLIPIDEMIA TYPE: ICD-10-CM

## 2023-03-07 DIAGNOSIS — Z12.5 PROSTATE CANCER SCREENING: ICD-10-CM

## 2023-03-07 LAB
ABSOLUTE EOS #: 0.11 K/UL (ref 0–0.44)
ABSOLUTE IMMATURE GRANULOCYTE: <0.03 K/UL (ref 0–0.3)
ABSOLUTE LYMPH #: 1.37 K/UL (ref 1.1–3.7)
ABSOLUTE MONO #: 0.52 K/UL (ref 0.1–1.2)
ALBUMIN SERPL-MCNC: 4.7 G/DL (ref 3.5–5.2)
ALBUMIN/GLOBULIN RATIO: 2.1 (ref 1–2.5)
ALP SERPL-CCNC: 73 U/L (ref 40–129)
ALT SERPL-CCNC: 27 U/L (ref 5–41)
ANION GAP SERPL CALCULATED.3IONS-SCNC: 15 MMOL/L (ref 9–17)
AST SERPL-CCNC: 20 U/L
BASOPHILS # BLD: 1 % (ref 0–2)
BASOPHILS ABSOLUTE: 0.05 K/UL (ref 0–0.2)
BILIRUB SERPL-MCNC: 0.7 MG/DL (ref 0.3–1.2)
BUN SERPL-MCNC: 19 MG/DL (ref 6–20)
CALCIUM SERPL-MCNC: 9.5 MG/DL (ref 8.6–10.4)
CHLORIDE SERPL-SCNC: 106 MMOL/L (ref 98–107)
CHOLEST SERPL-MCNC: 163 MG/DL
CHOLESTEROL/HDL RATIO: 3.5
CO2 SERPL-SCNC: 22 MMOL/L (ref 20–31)
CREAT SERPL-MCNC: 0.92 MG/DL (ref 0.7–1.2)
EOSINOPHILS RELATIVE PERCENT: 2 % (ref 1–4)
EST. AVERAGE GLUCOSE BLD GHB EST-MCNC: 131 MG/DL
GFR SERPL CREATININE-BSD FRML MDRD: >60 ML/MIN/1.73M2
GLUCOSE SERPL-MCNC: 153 MG/DL (ref 70–99)
HBA1C MFR BLD: 6.2 % (ref 4–6)
HCT VFR BLD AUTO: 45.8 % (ref 40.7–50.3)
HDLC SERPL-MCNC: 47 MG/DL
HGB BLD-MCNC: 15.2 G/DL (ref 13–17)
IMMATURE GRANULOCYTES: 0 %
LDLC SERPL CALC-MCNC: 94 MG/DL (ref 0–130)
LYMPHOCYTES # BLD: 24 % (ref 24–43)
MCH RBC QN AUTO: 30.4 PG (ref 25.2–33.5)
MCHC RBC AUTO-ENTMCNC: 33.2 G/DL (ref 28.4–34.8)
MCV RBC AUTO: 91.6 FL (ref 82.6–102.9)
MONOCYTES # BLD: 9 % (ref 3–12)
NRBC AUTOMATED: 0 PER 100 WBC
PDW BLD-RTO: 12.1 % (ref 11.8–14.4)
PLATELET # BLD AUTO: 226 K/UL (ref 138–453)
PMV BLD AUTO: 11.1 FL (ref 8.1–13.5)
POTASSIUM SERPL-SCNC: 4.8 MMOL/L (ref 3.7–5.3)
PROSTATE SPECIFIC ANTIGEN: 1.07 NG/ML
PROT SERPL-MCNC: 6.9 G/DL (ref 6.4–8.3)
RBC # BLD: 5 M/UL (ref 4.21–5.77)
SEG NEUTROPHILS: 64 % (ref 36–65)
SEGMENTED NEUTROPHILS ABSOLUTE COUNT: 3.56 K/UL (ref 1.5–8.1)
SODIUM SERPL-SCNC: 143 MMOL/L (ref 135–144)
TRIGL SERPL-MCNC: 109 MG/DL
TSH SERPL-ACNC: 3.98 UIU/ML (ref 0.3–5)
WBC # BLD AUTO: 5.6 K/UL (ref 3.5–11.3)

## 2023-03-09 ENCOUNTER — OFFICE VISIT (OUTPATIENT)
Dept: FAMILY MEDICINE CLINIC | Age: 58
End: 2023-03-09
Payer: COMMERCIAL

## 2023-03-09 VITALS
BODY MASS INDEX: 29.86 KG/M2 | WEIGHT: 197 LBS | TEMPERATURE: 97.2 F | HEART RATE: 78 BPM | OXYGEN SATURATION: 98 % | SYSTOLIC BLOOD PRESSURE: 104 MMHG | HEIGHT: 68 IN | DIASTOLIC BLOOD PRESSURE: 64 MMHG | RESPIRATION RATE: 16 BRPM

## 2023-03-09 DIAGNOSIS — E78.5 HYPERLIPIDEMIA, UNSPECIFIED HYPERLIPIDEMIA TYPE: ICD-10-CM

## 2023-03-09 DIAGNOSIS — E11.9 DIABETES MELLITUS WITHOUT COMPLICATION (HCC): ICD-10-CM

## 2023-03-09 DIAGNOSIS — L03.032 CHRONIC PARONYCHIA OF TOE OF LEFT FOOT: ICD-10-CM

## 2023-03-09 DIAGNOSIS — M25.522 LEFT ELBOW PAIN: Primary | ICD-10-CM

## 2023-03-09 PROCEDURE — 3044F HG A1C LEVEL LT 7.0%: CPT | Performed by: NURSE PRACTITIONER

## 2023-03-09 PROCEDURE — 99214 OFFICE O/P EST MOD 30 MIN: CPT | Performed by: NURSE PRACTITIONER

## 2023-03-09 SDOH — ECONOMIC STABILITY: FOOD INSECURITY: WITHIN THE PAST 12 MONTHS, YOU WORRIED THAT YOUR FOOD WOULD RUN OUT BEFORE YOU GOT MONEY TO BUY MORE.: NEVER TRUE

## 2023-03-09 SDOH — ECONOMIC STABILITY: INCOME INSECURITY: HOW HARD IS IT FOR YOU TO PAY FOR THE VERY BASICS LIKE FOOD, HOUSING, MEDICAL CARE, AND HEATING?: NOT HARD AT ALL

## 2023-03-09 SDOH — ECONOMIC STABILITY: FOOD INSECURITY: WITHIN THE PAST 12 MONTHS, THE FOOD YOU BOUGHT JUST DIDN'T LAST AND YOU DIDN'T HAVE MONEY TO GET MORE.: NEVER TRUE

## 2023-03-09 SDOH — ECONOMIC STABILITY: HOUSING INSECURITY
IN THE LAST 12 MONTHS, WAS THERE A TIME WHEN YOU DID NOT HAVE A STEADY PLACE TO SLEEP OR SLEPT IN A SHELTER (INCLUDING NOW)?: NO

## 2023-03-09 SDOH — ECONOMIC STABILITY: TRANSPORTATION INSECURITY
IN THE PAST 12 MONTHS, HAS LACK OF TRANSPORTATION KEPT YOU FROM MEETINGS, WORK, OR FROM GETTING THINGS NEEDED FOR DAILY LIVING?: NO

## 2023-03-09 ASSESSMENT — PATIENT HEALTH QUESTIONNAIRE - PHQ9
SUM OF ALL RESPONSES TO PHQ QUESTIONS 1-9: 0
2. FEELING DOWN, DEPRESSED OR HOPELESS: 0
SUM OF ALL RESPONSES TO PHQ QUESTIONS 1-9: 0
SUM OF ALL RESPONSES TO PHQ9 QUESTIONS 1 & 2: 0
1. LITTLE INTEREST OR PLEASURE IN DOING THINGS: 0

## 2023-03-09 ASSESSMENT — ENCOUNTER SYMPTOMS
ABDOMINAL PAIN: 0
COUGH: 0
EYE PAIN: 0
DIARRHEA: 0
SHORTNESS OF BREATH: 0
SINUS PAIN: 0
VOMITING: 0
BACK PAIN: 0
NAUSEA: 0
SORE THROAT: 0

## 2023-03-09 NOTE — PROGRESS NOTES
Visit Information    Have you changed or started any medications since your last visit including any over-the-counter medicines, vitamins, or herbal medicines? no   Are you having any side effects from any of your medications? -  no  Have you stopped taking any of your medications? Is so, why? -  no    Have you seen any other physician or provider since your last visit? No  Have you had any other diagnostic tests since your last visit? Yes - Records Obtained  Have you been seen in the emergency room and/or had an admission to a hospital since we last saw you? No  Have you had your routine dental cleaning in the past 6 months? yes -     Have you activated your Webs[employer] account? If not, what are your barriers? Yes     Patient Care Team:  CLARY Culp CNP as PCP - General (Certified Nurse Practitioner)  CLARY Culp CNP as PCP - Empaneled Provider    Medical History Review  Past Medical, Family, and Social History reviewed and does contribute to the patient presenting condition    Health Maintenance   Topic Date Due    COVID-19 Vaccine (1) Never done    Pneumococcal 0-64 years Vaccine (1 - PCV) Never done    HIV screen  Never done    Diabetic Alb to Cr ratio (uACR) test  Never done    Hepatitis C screen  Never done    Hepatitis B vaccine (1 of 3 - Risk 3-dose series) Never done    Diabetic foot exam  11/22/2020    Flu vaccine (1) 08/01/2022    A1C test (Diabetic or Prediabetic)  06/07/2023    Depression Screen  09/14/2023    Lipids  03/07/2024    GFR test (Diabetes, CKD 3-4, OR last GFR 15-59)  03/07/2024    Diabetic retinal exam  07/11/2024    DTaP/Tdap/Td vaccine (2 - Td or Tdap) 10/07/2024    Colorectal Cancer Screen  12/05/2024    Shingles vaccine  Completed    Hepatitis A vaccine  Aged Out    Hib vaccine  Aged Out    Meningococcal (ACWY) vaccine  Aged Out

## 2023-03-09 NOTE — PROGRESS NOTES
[facility]  [facility]  [address] [address]  Dept: [phone]  Dept Fax: [phone]    Arturo Todd is a 57 y.o. male who presents today for his medicalconditions/complaints as noted below.  Arturo Todd is c/o of Diabetes and Discuss Labs      HPI:     57-year-old male patient presents with complaints of follow up     Hx of NSTEMI, subsequent cath with stent placed per Dr. Vasquez. Was started on brilinta, aspirin, lisinopril, coreg.   BP and HR stable. Has been following with [facility] cardiology. Doing well with exercise, Diet has not been great.      History of type 2 diabetes.  Currently taking Metformin 2000 daily. Last a1c 6.3 to 6.2.  Has been actively working on diet and activity. Weight stable.      Hyperlipidemia, CAD currently managed with atorvastatin 40, last lipid level stable, and liver enzymes stable.    Left elobw, intermittent swelling, No acute injury or trauma. No hx of surgery or fracture.      Left paronychia 2nd toe, has been ongoing for several months, not healing as he would like.       Past Medical History:   Diagnosis Date    DM (diabetes mellitus) (HCC)     Hyperlipidemia         Current Outpatient Medications   Medication Sig Dispense Refill    diclofenac sodium (VOLTAREN) 1 % GEL Apply 2 g topically 4 times daily 50 g 1    aspirin 81 MG EC tablet Take 1 tablet by mouth daily 90 tablet 1    metFORMIN (GLUCOPHAGE-XR) 500 MG extended release tablet Take 4 tablets by mouth daily (with breakfast) 360 tablet 1    ticagrelor (BRILINTA) 90 MG TABS tablet Take 1 tablet by mouth 2 times daily 180 tablet 1    lisinopril (PRINIVIL;ZESTRIL) 5 MG tablet Take 1 tablet by mouth daily 90 tablet 1    carvedilol (COREG) 6.25 MG tablet Take 1 tablet by mouth 2 times daily (with meals) 180 tablet 1    atorvastatin (LIPITOR) 40 MG tablet Take 1 tablet by mouth nightly 90 tablet 1    nitroGLYCERIN (NITROSTAT) 0.4 MG SL tablet  up to max of 3 total doses. If no relief after 1 dose, call 209. 02 tablet 3    Respiratory Therapy Supplies KEILY 1 Device by Does not apply route nightly NASAL CPAP per recommendations of sleep medicine physician Dx : severe sleep apnea 1 Device 0     No current facility-administered medications for this visit. No Known Allergies    Subjective:      Review of Systems   Constitutional:  Negative for chills and fatigue. HENT:  Negative for congestion, ear pain, sinus pain and sore throat. Eyes:  Negative for pain and visual disturbance. Respiratory:  Negative for cough and shortness of breath. Cardiovascular:  Negative for chest pain and palpitations. Gastrointestinal:  Negative for abdominal pain, diarrhea, nausea and vomiting. Genitourinary:  Negative for penile pain and testicular pain. Musculoskeletal:  Positive for arthralgias and joint swelling. Negative for back pain and neck pain. Skin:  Positive for rash. Neurological:  Negative for dizziness and light-headedness. Hematological:  Does not bruise/bleed easily. All other systems reviewed and are negative.    :Objective     Physical Exam  Vitals and nursing note reviewed. Constitutional:       Appearance: Normal appearance. Cardiovascular:      Rate and Rhythm: Normal rate. Pulmonary:      Effort: Pulmonary effort is normal.      Breath sounds: Normal breath sounds. Musculoskeletal:      Left elbow: Swelling present. Tenderness present. Feet:    Feet:      Comments: Diabetic Foot Exam  -Amputation toes/ limbs? none  -Color? wnl  -Hair on feet/ toes? yes  -Skin abnormalities? none  -Nail abnormalities? none  -Vascular status pulse/ cap refill? wnl  -Monofilament? wnl    Skin:     General: Skin is warm and dry. Neurological:      General: No focal deficit present. Mental Status: He is alert and oriented to person, place, and time.      /64 (Site: Right Upper Arm, Position: Sitting, Cuff Size: Medium Adult) Pulse 78   Temp 97.2 °F (36.2 °C) (Tympanic)   Resp 16   Ht 5' 8\" (1.727 m)   Wt 197 lb (89.4 kg)   SpO2 98%   BMI 29.95 kg/m²     Lab Review   Hospital Outpatient Visit on 03/07/2023   Component Date Value    Glucose 03/07/2023 153 (A)     BUN 03/07/2023 19     Creatinine 03/07/2023 0.92     Est, Glom Filt Rate 03/07/2023 >60     Calcium 03/07/2023 9.5     Sodium 03/07/2023 143     Potassium 03/07/2023 4.8     Chloride 03/07/2023 106     CO2 03/07/2023 22     Anion Gap 03/07/2023 15     Alkaline Phosphatase 03/07/2023 73     ALT 03/07/2023 27     AST 03/07/2023 20     Total Bilirubin 03/07/2023 0.7     Total Protein 03/07/2023 6.9     Albumin 03/07/2023 4.7     Albumin/Globulin Ratio 03/07/2023 2.1     Hemoglobin A1C 03/07/2023 6.2 (A)     Estimated Avg Glucose 03/07/2023 131     TSH 03/07/2023 3.98     Cholesterol 03/07/2023 163     HDL 03/07/2023 47     LDL Cholesterol 03/07/2023 94     Chol/HDL Ratio 03/07/2023 3.5     Triglycerides 03/07/2023 109     PSA 03/07/2023 1.07     WBC 03/07/2023 5.6     RBC 03/07/2023 5.00     Hemoglobin 03/07/2023 15.2     Hematocrit 03/07/2023 45.8     MCV 03/07/2023 91.6     MCH 03/07/2023 30.4     MCHC 03/07/2023 33.2     RDW 03/07/2023 12.1     Platelets 51/51/2343 226     MPV 03/07/2023 11.1     NRBC Automated 03/07/2023 0.0     Seg Neutrophils 03/07/2023 64     Lymphocytes 03/07/2023 24     Monocytes 03/07/2023 9     Eosinophils % 03/07/2023 2     Basophils 03/07/2023 1     Immature Granulocytes 03/07/2023 0     Segs Absolute 03/07/2023 3.56     Absolute Lymph # 03/07/2023 1.37     Absolute Mono # 03/07/2023 0.52     Absolute Eos # 03/07/2023 0.11     Basophils Absolute 03/07/2023 0.05     Absolute Immature Granul* 03/07/2023 <0.03        Assessment and Plan      1. Left elbow pain  -     XR ELBOW LEFT (MIN 3 VIEWS); Future  -     diclofenac sodium (VOLTAREN) 1 % GEL;  Apply 2 g topically 4 times daily, Topical, 4 TIMES DAILY Starting Thu 3/9/2023, Disp-50 g, R-1, Normal  2. Chronic paronychia of toe of left foot  -     [facility], DPM, [address] [place] [address], [place]  3. Hyperlipidemia, unspecified hyperlipidemia type  4. Diabetes mellitus without complication ([facility] [address]  -      DIABETES FOOT EXAM         Recent labs reviewed. Concern for a bony spur to the elbow versus chronic bursitis recommend elbow x-ray and topical Voltaren  Scabbed lesion to the foot appears minor, given history of diabetes recommend consultation with podiatry  Recheck 6 months, sooner prn        No results found for this visit on 03/09/23. Return in about 6 months (around 9/9/2023), or if symptoms worsen or fail to improve. Orders Placed This Encounter   Medications    diclofenac sodium (VOLTAREN) 1 % GEL     Sig: Apply 2 g topically 4 times daily     Dispense:  50 g     Refill:  1        Patient given educational materials - see patient instructions. Discussed use, benefit, and side effects of prescribed medications. All patientquestions answered. Pt voiced understanding. Patient given educational materials - see patient instructions. Discussed use, benefit, and side effects of prescribed medications. All patientquestions answered. Pt voiced understanding. This note was transcribed using dictation with Dragon services. Efforts were made to correct any errors but some words may be misinterpreted.     Patient assumes risks associated with failure to complete recommended testing and treatments in a timely manner    Electronically signed by CLARY Ferreira CNP on 3/9/2023at 8:23 AM

## 2023-03-09 NOTE — PATIENT INSTRUCTIONS
Patient Education        Learning About Type 2 Diabetes  What is type 2 diabetes? Type 2 diabetes is a condition in which you have too much sugar (glucose) in your blood. Glucose is a type of sugar produced in your body when carbohydrates and other foods are digested. It provides energy to cells throughout the body. Normally, blood sugar levels increase after you eat a meal. When blood sugar rises, cells in the pancreas release insulin, which causes the body to absorb sugar from the blood and lowers the blood sugar level to normal.  When you have type 2 diabetes, sugar stays in the blood rather than entering the body's cells to be used for energy. This results in high blood sugar. It happens when your body can't use insulin the right way. Over time, high blood sugar can harm many parts of the body, such as your eyes, heart, blood vessels, nerves, and kidneys. It can also increase your risk for other health problems (complications). What can you expect with type 2 diabetes? Corie Davison keep hearing about how important it is to keep your blood sugar within a target range. That's because over time, high blood sugar can lead to serious problems. It can:  Harm your eyes, nerves, and kidneys. Damage your blood vessels, leading to heart disease and stroke. Reduce blood flow and cause nerve damage to parts of your body, especially your feet. This can cause slow healing and pain when you walk. Make your immune system weak and less able to fight infections. When people hear the word \"diabetes,\" they often think of problems like these. But daily care and treatment can help prevent or delay these problems. The goal is to keep your blood sugar in a target range. That's the best way to reduce your chance of having more problems from diabetes. What are the symptoms? Some people who have type 2 diabetes may not have any symptoms early on.  Many people with the disease don't even know they have it at first. But with time, diabetes starts to cause symptoms. You have most symptoms of type 2 diabetes when your blood sugar is either too high or too low. The most common symptoms of high blood sugar include: Thirst.  Needing to urinate often. Weight loss. Blurry vision. The symptoms of low blood sugar include:  Sweating. Shakiness. Weakness. Hunger. Confusion. You're not likely to get symptoms of low blood sugar unless you take insulin or use certain diabetes medicines that lower blood sugar. How can you help prevent type 2 diabetes? There are things you can do to help prevent type 2 diabetes. Stay at a healthy weight. Exercise regularly, and eat healthy foods. Even small changes can make a difference. If you have prediabetes, the medicine metformin can help prevent type 2 diabetes. How is type 2 diabetes treated? Treatment for type 2 diabetes will change over time to meet your needs. But the focus of your treatment will usually be to keep your blood sugar levels in your target range. This will help prevent problems such as eye, kidney, heart, blood vessel, and nerve disease. Some people may need medicines to help their bodies make insulin or decrease insulin resistance. Some medicines slow down how quickly the body absorbs carbohydrates. Treatment to manage type 2 diabetes includes:  Making healthy food choices and being active. Losing weight, if you need to. Seeing your doctor regularly. Keeping your blood sugar in your target range. Taking medicines, if you need them. Quitting smoking, if you smoke. Keeping your blood pressure and cholesterol under control. Follow-up care is a key part of your treatment and safety. Be sure to make and go to all appointments, and call your doctor if you are having problems. It's also a good idea to know your test results and keep a list of the medicines you take. Where can you learn more?   Go to http://www.rosen.com/ and enter H839 to learn more about \"Learning About Type 2 Diabetes. \"  Current as of: April 13, 2022               Content Version: 13.5  © 5043-8955 [employer]wise, Incorporated. Care instructions adapted under license by [facility] ([facility]). If you have questions about a medical condition or this instruction, always ask your healthcare professional. [address] any warranty or liability for your use of this information.

## 2023-03-10 ENCOUNTER — TELEPHONE (OUTPATIENT)
Dept: ADMINISTRATIVE | Age: 58
End: 2023-03-10

## 2023-03-10 NOTE — TELEPHONE ENCOUNTER
Patient has referral from [facility], has a sore on one of his toes, on left foot, pt is diabetic, pt returning call, unable to reach office please call pt [phone] psc/sc

## 2023-03-11 DIAGNOSIS — M25.522 LEFT ELBOW PAIN: Primary | ICD-10-CM

## 2023-03-22 ENCOUNTER — OFFICE VISIT (OUTPATIENT)
Dept: ORTHOPEDIC SURGERY | Age: 58
End: 2023-03-22
Payer: COMMERCIAL

## 2023-03-22 VITALS — BODY MASS INDEX: 29.55 KG/M2 | WEIGHT: 195 LBS | HEIGHT: 68 IN | RESPIRATION RATE: 14 BRPM

## 2023-03-22 DIAGNOSIS — M25.522 LEFT ELBOW PAIN: Primary | ICD-10-CM

## 2023-03-22 PROCEDURE — 99203 OFFICE O/P NEW LOW 30 MIN: CPT | Performed by: ORTHOPAEDIC SURGERY

## 2023-03-22 NOTE — PROGRESS NOTES
ORTHOPEDIC PATIENT EVALUATION      HPI / Chief Complaint  Ricardo Zepeda is a 61 y.o. right-hand-dominant male who presents for evaluation of his left elbow. He has been dealing with pain now for about 2 weeks. He states that initially it was quite painful and swollen prompting him to go to the emergency department. The swelling has resolved but he continues to have some mild pain over the posterior aspect the elbow. He describes this as a constant dull ache which she rates as a 1/10. It does not prevent him from continuing on with his daily activities. He denies having any radicular symptoms, fevers, chills, sweats or any constitutional symptoms. Past Medical History  Alireza Gregory  has a past medical history of DM (diabetes mellitus) ([address] and Hyperlipidemia. Past Surgical History  Alireza Gregory  has a past surgical history that includes Colonoscopy and Coronary angioplasty with stent. Current Medications  Current Outpatient Medications   Medication Sig Dispense Refill    diclofenac sodium (VOLTAREN) 1 % GEL Apply 2 g topically 4 times daily 50 g 1    aspirin 81 MG EC tablet Take 1 tablet by mouth daily 90 tablet 1    metFORMIN (GLUCOPHAGE-XR) 500 MG extended release tablet Take 4 tablets by mouth daily (with breakfast) 360 tablet 1    ticagrelor (BRILINTA) 90 MG TABS tablet Take 1 tablet by mouth 2 times daily 180 tablet 1    lisinopril (PRINIVIL;ZESTRIL) 5 MG tablet Take 1 tablet by mouth daily 90 tablet 1    carvedilol (COREG) 6.25 MG tablet Take 1 tablet by mouth 2 times daily (with meals) 180 tablet 1    atorvastatin (LIPITOR) 40 MG tablet Take 1 tablet by mouth nightly 90 tablet 1    nitroGLYCERIN (NITROSTAT) 0.4 MG SL tablet up to max of 3 total doses.  If no relief after 1 dose, call 911. 25 tablet 3    Respiratory Therapy Supplies KEILY 1 Device by Does not apply route nightly NASAL CPAP per recommendations of sleep medicine physician Dx : severe sleep apnea 1 Device 0     No current facility-administered

## 2023-04-20 ENCOUNTER — OFFICE VISIT (OUTPATIENT)
Dept: PODIATRY | Age: 58
End: 2023-04-20
Payer: COMMERCIAL

## 2023-04-20 VITALS — BODY MASS INDEX: 29.55 KG/M2 | HEIGHT: 68 IN | WEIGHT: 195 LBS

## 2023-04-20 DIAGNOSIS — M79.672 LEFT FOOT PAIN: ICD-10-CM

## 2023-04-20 DIAGNOSIS — M67.40 MUCOID CYST, JOINT: Primary | ICD-10-CM

## 2023-04-20 PROCEDURE — 99213 OFFICE O/P EST LOW 20 MIN: CPT | Performed by: PODIATRIST

## 2023-04-25 DIAGNOSIS — I21.4 NSTEMI (NON-ST ELEVATED MYOCARDIAL INFARCTION) (HCC): ICD-10-CM

## 2023-04-26 RX ORDER — CARVEDILOL 6.25 MG/1
TABLET ORAL
Qty: 60 TABLET | Refills: 5 | Status: SHIPPED | OUTPATIENT
Start: 2023-04-26

## 2023-05-03 DIAGNOSIS — I21.4 NSTEMI (NON-ST ELEVATED MYOCARDIAL INFARCTION) (HCC): ICD-10-CM

## 2023-05-03 DIAGNOSIS — E78.5 HYPERLIPIDEMIA, UNSPECIFIED HYPERLIPIDEMIA TYPE: ICD-10-CM

## 2023-05-04 RX ORDER — ATORVASTATIN CALCIUM 40 MG/1
TABLET, FILM COATED ORAL
Qty: 30 TABLET | OUTPATIENT
Start: 2023-05-04

## 2023-05-05 DIAGNOSIS — I21.4 NSTEMI (NON-ST ELEVATED MYOCARDIAL INFARCTION) (HCC): ICD-10-CM

## 2023-05-05 DIAGNOSIS — E78.5 HYPERLIPIDEMIA, UNSPECIFIED HYPERLIPIDEMIA TYPE: ICD-10-CM

## 2023-05-08 RX ORDER — ATORVASTATIN CALCIUM 40 MG/1
TABLET, FILM COATED ORAL
Qty: 30 TABLET | OUTPATIENT
Start: 2023-05-08

## 2023-06-20 DIAGNOSIS — I21.4 NSTEMI (NON-ST ELEVATED MYOCARDIAL INFARCTION) (HCC): ICD-10-CM

## 2023-06-20 RX ORDER — LISINOPRIL 5 MG/1
5 TABLET ORAL DAILY
Qty: 90 TABLET | Refills: 1 | Status: SHIPPED | OUTPATIENT
Start: 2023-06-20

## 2023-06-26 DIAGNOSIS — I21.4 NSTEMI (NON-ST ELEVATED MYOCARDIAL INFARCTION) (HCC): ICD-10-CM

## 2023-06-26 LAB — DIABETIC RETINOPATHY: NEGATIVE

## 2023-06-26 RX ORDER — LISINOPRIL 5 MG/1
5 TABLET ORAL DAILY
Qty: 90 TABLET | Refills: 1 | Status: SHIPPED | OUTPATIENT
Start: 2023-06-26

## 2023-07-04 DIAGNOSIS — E11.9 DIABETES MELLITUS WITHOUT COMPLICATION (HCC): ICD-10-CM

## 2023-07-04 DIAGNOSIS — I21.4 NSTEMI (NON-ST ELEVATED MYOCARDIAL INFARCTION) (HCC): ICD-10-CM

## 2023-07-05 RX ORDER — TICAGRELOR 90 MG/1
TABLET ORAL
Qty: 60 TABLET | OUTPATIENT
Start: 2023-07-05

## 2023-07-05 RX ORDER — METFORMIN HYDROCHLORIDE 500 MG/1
TABLET, EXTENDED RELEASE ORAL
Qty: 360 TABLET | Refills: 1 | Status: SHIPPED | OUTPATIENT
Start: 2023-07-05

## 2023-07-12 ENCOUNTER — OFFICE VISIT (OUTPATIENT)
Dept: FAMILY MEDICINE CLINIC | Age: 58
End: 2023-07-12
Payer: COMMERCIAL

## 2023-07-12 VITALS
BODY MASS INDEX: 30.44 KG/M2 | WEIGHT: 200.2 LBS | SYSTOLIC BLOOD PRESSURE: 110 MMHG | HEART RATE: 84 BPM | OXYGEN SATURATION: 98 % | DIASTOLIC BLOOD PRESSURE: 60 MMHG

## 2023-07-12 DIAGNOSIS — Z12.5 PROSTATE CANCER SCREENING: ICD-10-CM

## 2023-07-12 DIAGNOSIS — Z13.29 THYROID DISORDER SCREENING: ICD-10-CM

## 2023-07-12 DIAGNOSIS — E78.5 HYPERLIPIDEMIA, UNSPECIFIED HYPERLIPIDEMIA TYPE: ICD-10-CM

## 2023-07-12 DIAGNOSIS — Z11.4 ENCOUNTER FOR SCREENING FOR HIV: ICD-10-CM

## 2023-07-12 DIAGNOSIS — I21.4 NSTEMI (NON-ST ELEVATED MYOCARDIAL INFARCTION) (HCC): Primary | ICD-10-CM

## 2023-07-12 DIAGNOSIS — Z11.59 NEED FOR HEPATITIS C SCREENING TEST: ICD-10-CM

## 2023-07-12 DIAGNOSIS — E11.9 DIABETES MELLITUS WITHOUT COMPLICATION (HCC): ICD-10-CM

## 2023-07-12 PROCEDURE — 99213 OFFICE O/P EST LOW 20 MIN: CPT | Performed by: NURSE PRACTITIONER

## 2023-07-12 PROCEDURE — 3044F HG A1C LEVEL LT 7.0%: CPT | Performed by: NURSE PRACTITIONER

## 2023-07-12 ASSESSMENT — ENCOUNTER SYMPTOMS
ABDOMINAL PAIN: 0
BACK PAIN: 0
SHORTNESS OF BREATH: 0
EYE PAIN: 0
SINUS PAIN: 0
VOMITING: 0
DIARRHEA: 0
COUGH: 0
NAUSEA: 0
SORE THROAT: 0

## 2023-07-12 NOTE — PATIENT INSTRUCTIONS
Patient Education        Learning About Type 2 Diabetes  What is type 2 diabetes? Type 2 diabetes is a condition in which you have too much sugar (glucose) in your blood. Glucose is a type of sugar produced in your body when carbohydrates and other foods are digested. It provides energy to cells throughout the body. Normally, blood sugar levels increase after you eat a meal. When blood sugar rises, cells in the pancreas release insulin, which causes the body to absorb sugar from the blood and lowers the blood sugar level to normal.  When you have type 2 diabetes, sugar stays in the blood rather than entering the body's cells to be used for energy. This results in high blood sugar. It happens when your body can't use insulin the right way. Over time, high blood sugar can harm many parts of the body, such as your eyes, heart, blood vessels, nerves, and kidneys. It can also increase your risk for other health problems (complications). What can you expect with type 2 diabetes? [place] keep hearing about how important it is to keep your blood sugar within a target range. That's because over time, high blood sugar can lead to serious problems. It can:  Harm your eyes, nerves, and kidneys. Damage your blood vessels, leading to heart disease and stroke. Reduce blood flow and cause nerve damage to parts of your body, especially your feet. This can cause slow healing and pain when you walk. Make your immune system weak and less able to fight infections. When people hear the word \"diabetes,\" they often think of problems like these. But daily care and treatment can help prevent or delay these problems. The goal is to keep your blood sugar in a target range. That's the best way to reduce your chance of having more problems from diabetes. What are the symptoms? Some people who have type 2 diabetes may not have any symptoms early on.  Many people with the disease don't even know they have it at first. But with time,

## 2023-08-11 ENCOUNTER — HOSPITAL ENCOUNTER (OUTPATIENT)
Age: 58
Setting detail: SPECIMEN
Discharge: HOME | End: 2023-08-11

## 2023-08-11 DIAGNOSIS — E11.9 DIABETES MELLITUS WITHOUT COMPLICATION (HCC): ICD-10-CM

## 2023-08-11 DIAGNOSIS — I21.4 NSTEMI (NON-ST ELEVATED MYOCARDIAL INFARCTION) (HCC): ICD-10-CM

## 2023-08-11 DIAGNOSIS — Z11.59 NEED FOR HEPATITIS C SCREENING TEST: ICD-10-CM

## 2023-08-11 DIAGNOSIS — Z11.4 ENCOUNTER FOR SCREENING FOR HIV: ICD-10-CM

## 2023-08-11 DIAGNOSIS — Z12.5 PROSTATE CANCER SCREENING: ICD-10-CM

## 2023-08-11 DIAGNOSIS — Z13.29 THYROID DISORDER SCREENING: ICD-10-CM

## 2023-08-11 DIAGNOSIS — E78.5 HYPERLIPIDEMIA, UNSPECIFIED HYPERLIPIDEMIA TYPE: ICD-10-CM

## 2023-08-11 LAB
ALBUMIN SERPL-MCNC: 4.6 G/DL (ref 3.5–5.2)
ALBUMIN/GLOB SERPL: 2 {RATIO} (ref 1–2.5)
ALP SERPL-CCNC: 68 U/L (ref 40–129)
ALT SERPL-CCNC: 19 U/L (ref 5–41)
ANION GAP SERPL CALCULATED.3IONS-SCNC: 14 MMOL/L (ref 9–17)
AST SERPL-CCNC: 15 U/L
BILIRUB SERPL-MCNC: 0.7 MG/DL (ref 0.3–1.2)
BUN SERPL-MCNC: 11 MG/DL (ref 6–20)
CALCIUM SERPL-MCNC: 9.1 MG/DL (ref 8.6–10.4)
CHLORIDE SERPL-SCNC: 104 MMOL/L (ref 98–107)
CHOLEST SERPL-MCNC: 146 MG/DL
CHOLESTEROL/HDL RATIO: 3.2
CO2 SERPL-SCNC: 23 MMOL/L (ref 20–31)
CREAT SERPL-MCNC: 0.9 MG/DL (ref 0.7–1.2)
ERYTHROCYTE [DISTWIDTH] IN BLOOD BY AUTOMATED COUNT: 12.5 % (ref 11.8–14.4)
EST. AVERAGE GLUCOSE BLD GHB EST-MCNC: 126 MG/DL
GFR SERPL CREATININE-BSD FRML MDRD: >60 ML/MIN/1.73M2
GLUCOSE SERPL-MCNC: 136 MG/DL (ref 70–99)
HBA1C MFR BLD: 6 % (ref 4–6)
HCT VFR BLD AUTO: 47.3 % (ref 40.7–50.3)
HCV AB SERPL QL IA: NONREACTIVE
HDLC SERPL-MCNC: 45 MG/DL
HGB BLD-MCNC: 15.6 G/DL (ref 13–17)
HIV 1+2 AB+HIV1 P24 AG SERPL QL IA: NONREACTIVE
LDLC SERPL CALC-MCNC: 79 MG/DL (ref 0–130)
MCH RBC QN AUTO: 30.8 PG (ref 25.2–33.5)
MCHC RBC AUTO-ENTMCNC: 33 G/DL (ref 28.4–34.8)
MCV RBC AUTO: 93.5 FL (ref 82.6–102.9)
NRBC BLD-RTO: 0 PER 100 WBC
PLATELET # BLD AUTO: 220 K/UL (ref 138–453)
PMV BLD AUTO: 11 FL (ref 8.1–13.5)
POTASSIUM SERPL-SCNC: 4.2 MMOL/L (ref 3.7–5.3)
PROT SERPL-MCNC: 6.9 G/DL (ref 6.4–8.3)
PSA SERPL-MCNC: 1.49 NG/ML
RBC # BLD AUTO: 5.06 M/UL (ref 4.21–5.77)
SODIUM SERPL-SCNC: 141 MMOL/L (ref 135–144)
TRIGL SERPL-MCNC: 109 MG/DL
TSH SERPL DL<=0.05 MIU/L-ACNC: 2.64 UIU/ML (ref 0.3–5)
WBC OTHER # BLD: 6.4 K/UL (ref 3.5–11.3)

## 2023-09-11 ENCOUNTER — OFFICE VISIT (OUTPATIENT)
Dept: FAMILY MEDICINE CLINIC | Age: 58
End: 2023-09-11
Payer: COMMERCIAL

## 2023-09-11 VITALS
DIASTOLIC BLOOD PRESSURE: 72 MMHG | SYSTOLIC BLOOD PRESSURE: 118 MMHG | TEMPERATURE: 97 F | HEART RATE: 65 BPM | HEIGHT: 68 IN | OXYGEN SATURATION: 98 % | WEIGHT: 199.8 LBS | BODY MASS INDEX: 30.28 KG/M2

## 2023-09-11 DIAGNOSIS — E11.9 DIABETES MELLITUS WITHOUT COMPLICATION (HCC): Primary | ICD-10-CM

## 2023-09-11 DIAGNOSIS — N52.9 ERECTILE DYSFUNCTION, UNSPECIFIED ERECTILE DYSFUNCTION TYPE: ICD-10-CM

## 2023-09-11 DIAGNOSIS — I21.4 NSTEMI (NON-ST ELEVATED MYOCARDIAL INFARCTION) (HCC): ICD-10-CM

## 2023-09-11 DIAGNOSIS — E78.5 HYPERLIPIDEMIA, UNSPECIFIED HYPERLIPIDEMIA TYPE: ICD-10-CM

## 2023-09-11 PROCEDURE — 99214 OFFICE O/P EST MOD 30 MIN: CPT | Performed by: NURSE PRACTITIONER

## 2023-09-11 PROCEDURE — 3044F HG A1C LEVEL LT 7.0%: CPT | Performed by: NURSE PRACTITIONER

## 2023-09-11 RX ORDER — METFORMIN HYDROCHLORIDE 500 MG/1
1000 TABLET, EXTENDED RELEASE ORAL
Qty: 180 TABLET | Refills: 0 | Status: SHIPPED | OUTPATIENT
Start: 2023-09-11

## 2023-09-11 RX ORDER — SILDENAFIL 25 MG/1
25 TABLET, FILM COATED ORAL PRN
Qty: 30 TABLET | Refills: 0 | Status: SHIPPED | OUTPATIENT
Start: 2023-09-11

## 2023-09-11 ASSESSMENT — ENCOUNTER SYMPTOMS
BACK PAIN: 0
SINUS PAIN: 0
SORE THROAT: 0
VOMITING: 0
EYE PAIN: 0
DIARRHEA: 0
NAUSEA: 0
COUGH: 0
SHORTNESS OF BREATH: 0
ABDOMINAL PAIN: 0

## 2023-09-11 NOTE — PATIENT INSTRUCTIONS
Patient Education        Learning About Type 2 Diabetes  What is type 2 diabetes? Type 2 diabetes is a condition in which you have too much sugar (glucose) in your blood. Glucose is a type of sugar produced in your body when carbohydrates and other foods are digested. It provides energy to cells throughout the body. Normally, blood sugar levels increase after you eat a meal. When blood sugar rises, cells in the pancreas release insulin, which causes the body to absorb sugar from the blood and lowers the blood sugar level to normal.  When you have type 2 diabetes, sugar stays in the blood rather than entering the body's cells to be used for energy. This results in high blood sugar. It happens when your body can't use insulin the right way. Over time, high blood sugar can harm many parts of the body, such as your eyes, heart, blood vessels, nerves, and kidneys. It can also increase your risk for other health problems (complications). What can you expect with type 2 diabetes? [place] [facility] keep hearing about how important it is to keep your blood sugar within a target range. That's because over time, high blood sugar can lead to serious problems. It can:  Harm your eyes, nerves, and kidneys. Damage your blood vessels, leading to heart disease and stroke. Reduce blood flow and cause nerve damage to parts of your body, especially your feet. This can cause slow healing and pain when you walk. Make your immune system weak and less able to fight infections. When people hear the word \"diabetes,\" they often think of problems like these. But daily care and treatment can help prevent or delay these problems. The goal is to keep your blood sugar in a target range. That's the best way to reduce your chance of having more problems from diabetes. What are the symptoms? Some people who have type 2 diabetes may not have any symptoms early on.  Many people with the disease don't even know they have it at first. But with time,

## 2023-09-11 NOTE — PROGRESS NOTES
[facility]  [address]  [address] [address]  Dept: [phone]  Dept Fax: [phone]    Shivani Mac is a 60 y.o. male who presents today for his medicalconditions/complaints as noted below. Shivani Mac is c/o of Diabetes      HPI:     54-year-old male patient presents with complaints of follow up     Hx of NSTEMI, subsequent cath with stent placed per Dr. Nadyne Canavan. Medically managed with  aspirin, coreg. BP and HR stable. Hyperlipidemia, CAD currently managed with atorvastatin 40, last lipid level stable, and liver enzymes stable. Has been following with [facility] cardiology. Doing well with exercise, Diet has not been great. History of type 2 diabetes. Currently taking Metformin 1000 daily, reduced from 2000. Last a1c 6.2 to 6.0. Has been actively working on diet and activity. Weight stable. Due for microalbumin             Past Medical History:   Diagnosis Date    DM (diabetes mellitus) ([address])     Hyperlipidemia         Current Outpatient Medications   Medication Sig Dispense Refill    metFORMIN (GLUCOPHAGE-XR) 500 MG extended release tablet Take 2 tablets by mouth daily (with breakfast) 180 tablet 0    sildenafil (VIAGRA) 25 MG tablet Take 1 tablet by mouth as needed for Erectile Dysfunction 30 tablet 0    carvedilol (COREG) 6.25 MG tablet TAKE ONE TABLET BY MOUTH TWICE A DAY WITH MEALS 60 tablet 5    aspirin 81 MG EC tablet Take 1 tablet by mouth daily 90 tablet 1    atorvastatin (LIPITOR) 40 MG tablet Take 1 tablet by mouth nightly 90 tablet 1     No current facility-administered medications for this visit. No Known Allergies    Subjective:      Review of Systems   Constitutional:  Negative for chills and fatigue. HENT:  Negative for congestion, ear pain, sinus pain and sore throat. Eyes:  Negative for pain and visual disturbance. Respiratory:  Negative for cough and shortness of breath.     Cardiovascular:

## 2023-09-11 NOTE — PROGRESS NOTES
Visit Information    Have you changed or started any medications since your last visit including any over-the-counter medicines, vitamins, or herbal medicines? no   Have you stopped taking any of your medications? Is so, why? -  no  Are you having any side effects from any of your medications? - no    Have you seen any other physician or provider since your last visit?  no   Have you had any other diagnostic tests since your last visit?  no   Have you been seen in the emergency room and/or had an admission in a hospital since we last saw you?  no   Have you had your routine dental cleaning in the past 6 months?  no     Do you have an active MyChart account? If no, what is the barrier?   Yes    Patient Care Team:  CLARY Mccoy CNP as PCP - General (Certified Nurse Practitioner)  CLARY Mccoy CNP as PCP - Empaneled Provider    Medical History Review  Past Medical, Family, and Social History reviewed and  contribute to the patient presenting condition    Health Maintenance   Topic Date Due    Hepatitis B vaccine (1 of 3 - 3-dose series) Never done    COVID-19 Vaccine (1) Never done    Pneumococcal 0-64 years Vaccine (1 - PCV) Never done    Diabetic Alb to Cr ratio (uACR) test  Never done    Flu vaccine (1) 08/01/2023    A1C test (Diabetic or Prediabetic)  11/11/2023    Diabetic foot exam  03/09/2024    Depression Screen  03/09/2024    Lipids  08/11/2024    GFR test (Diabetes, CKD 3-4, OR last GFR 15-59)  08/11/2024    DTaP/Tdap/Td vaccine (2 - Td or Tdap) 10/07/2024    Colorectal Cancer Screen  12/05/2024    Diabetic retinal exam  06/26/2025    Shingles vaccine  Completed    Hepatitis C screen  Completed    HIV screen  Completed    Hepatitis A vaccine  Aged Out    Hib vaccine  Aged Out    Meningococcal (ACWY) vaccine  Aged Out    Prostate Specific Antigen (PSA) Screening or Monitoring  Discontinued

## 2023-12-13 DIAGNOSIS — I21.4 NSTEMI (NON-ST ELEVATED MYOCARDIAL INFARCTION) (HCC): ICD-10-CM

## 2023-12-13 RX ORDER — CARVEDILOL 6.25 MG/1
6.25 TABLET ORAL 2 TIMES DAILY WITH MEALS
Qty: 60 TABLET | Refills: 5 | Status: SHIPPED | OUTPATIENT
Start: 2023-12-13

## 2024-02-17 ENCOUNTER — PATIENT MESSAGE (OUTPATIENT)
Dept: FAMILY MEDICINE CLINIC | Age: 59
End: 2024-02-17

## 2024-02-17 DIAGNOSIS — Z13.29 THYROID DISORDER SCREENING: ICD-10-CM

## 2024-02-17 DIAGNOSIS — E11.9 DIABETES MELLITUS WITHOUT COMPLICATION (HCC): ICD-10-CM

## 2024-02-17 DIAGNOSIS — Z12.5 PROSTATE CANCER SCREENING: ICD-10-CM

## 2024-02-17 DIAGNOSIS — E78.5 HYPERLIPIDEMIA, UNSPECIFIED HYPERLIPIDEMIA TYPE: Primary | ICD-10-CM

## 2024-02-19 ENCOUNTER — HOSPITAL ENCOUNTER (OUTPATIENT)
Age: 59
Setting detail: SPECIMEN
Discharge: HOME | End: 2024-02-19

## 2024-02-19 DIAGNOSIS — Z13.29 THYROID DISORDER SCREENING: ICD-10-CM

## 2024-02-19 DIAGNOSIS — Z12.5 PROSTATE CANCER SCREENING: ICD-10-CM

## 2024-02-19 DIAGNOSIS — E78.5 HYPERLIPIDEMIA, UNSPECIFIED HYPERLIPIDEMIA TYPE: ICD-10-CM

## 2024-02-19 DIAGNOSIS — E11.9 DIABETES MELLITUS WITHOUT COMPLICATION (HCC): ICD-10-CM

## 2024-02-19 LAB
ALBUMIN SERPL-MCNC: 4.6 G/DL (ref 3.5–5.2)
ALBUMIN/GLOB SERPL: 2 {RATIO} (ref 1–2.5)
ALP SERPL-CCNC: 86 U/L (ref 40–129)
ALT SERPL-CCNC: 32 U/L (ref 10–50)
ANION GAP SERPL CALCULATED.3IONS-SCNC: 12 MMOL/L (ref 9–16)
AST SERPL-CCNC: 20 U/L (ref 10–50)
BILIRUB SERPL-MCNC: 0.6 MG/DL (ref 0–1.2)
BUN SERPL-MCNC: 15 MG/DL (ref 6–20)
CALCIUM SERPL-MCNC: 9.4 MG/DL (ref 8.6–10.4)
CHLORIDE SERPL-SCNC: 101 MMOL/L (ref 98–107)
CHOLEST SERPL-MCNC: 214 MG/DL (ref 0–199)
CHOLESTEROL/HDL RATIO: 4
CO2 SERPL-SCNC: 25 MMOL/L (ref 20–31)
CREAT SERPL-MCNC: 1 MG/DL (ref 0.7–1.2)
ERYTHROCYTE [DISTWIDTH] IN BLOOD BY AUTOMATED COUNT: 12.2 % (ref 11.8–14.4)
EST. AVERAGE GLUCOSE BLD GHB EST-MCNC: 157 MG/DL
GFR SERPL CREATININE-BSD FRML MDRD: >60 ML/MIN/1.73M2
GLUCOSE SERPL-MCNC: 208 MG/DL (ref 74–99)
HBA1C MFR BLD: 7.1 % (ref 4–6)
HCT VFR BLD AUTO: 46.9 % (ref 40.7–50.3)
HDLC SERPL-MCNC: 52 MG/DL
HGB BLD-MCNC: 15.9 G/DL (ref 13–17)
LDLC SERPL CALC-MCNC: 135 MG/DL (ref 0–100)
MCH RBC QN AUTO: 30.3 PG (ref 25.2–33.5)
MCHC RBC AUTO-ENTMCNC: 33.9 G/DL (ref 28.4–34.8)
MCV RBC AUTO: 89.3 FL (ref 82.6–102.9)
NRBC BLD-RTO: 0 PER 100 WBC
PLATELET # BLD AUTO: 212 K/UL (ref 138–453)
PMV BLD AUTO: 10.5 FL (ref 8.1–13.5)
POTASSIUM SERPL-SCNC: 4.8 MMOL/L (ref 3.7–5.3)
PROT SERPL-MCNC: 7 G/DL (ref 6.6–8.7)
PSA SERPL-MCNC: 1.2 NG/ML
RBC # BLD AUTO: 5.25 M/UL (ref 4.21–5.77)
SODIUM SERPL-SCNC: 138 MMOL/L (ref 136–145)
TRIGL SERPL-MCNC: 138 MG/DL
TSH SERPL DL<=0.05 MIU/L-ACNC: 1.99 UIU/ML (ref 0.27–4.2)
VLDLC SERPL CALC-MCNC: 28 MG/DL
WBC OTHER # BLD: 7 K/UL (ref 3.5–11.3)

## 2024-02-19 NOTE — TELEPHONE ENCOUNTER
From: Arturo Todd  To: Abrahan Maria  Sent: 2/17/2024 11:45 AM EST  Subject: Blood test for Cardiology appt and A1C    Davide Grissom, is it possible to be proactive again and get a full blood test before my Cardiology appointment on Thursday, Feb 22nd?  Thank you, Baron

## 2024-03-11 ENCOUNTER — OFFICE VISIT (OUTPATIENT)
Dept: FAMILY MEDICINE CLINIC | Age: 59
End: 2024-03-11
Payer: COMMERCIAL

## 2024-03-11 VITALS
WEIGHT: 210.4 LBS | TEMPERATURE: 97.1 F | DIASTOLIC BLOOD PRESSURE: 62 MMHG | SYSTOLIC BLOOD PRESSURE: 114 MMHG | HEIGHT: 68 IN | BODY MASS INDEX: 31.89 KG/M2 | HEART RATE: 98 BPM | OXYGEN SATURATION: 94 %

## 2024-03-11 DIAGNOSIS — E78.5 HYPERLIPIDEMIA, UNSPECIFIED HYPERLIPIDEMIA TYPE: Primary | ICD-10-CM

## 2024-03-11 DIAGNOSIS — E11.9 DIABETES MELLITUS WITHOUT COMPLICATION (HCC): ICD-10-CM

## 2024-03-11 PROCEDURE — 99213 OFFICE O/P EST LOW 20 MIN: CPT | Performed by: NURSE PRACTITIONER

## 2024-03-11 PROCEDURE — 3051F HG A1C>EQUAL 7.0%<8.0%: CPT | Performed by: NURSE PRACTITIONER

## 2024-03-11 SDOH — ECONOMIC STABILITY: FOOD INSECURITY: WITHIN THE PAST 12 MONTHS, THE FOOD YOU BOUGHT JUST DIDN'T LAST AND YOU DIDN'T HAVE MONEY TO GET MORE.: NEVER TRUE

## 2024-03-11 SDOH — ECONOMIC STABILITY: FOOD INSECURITY: WITHIN THE PAST 12 MONTHS, YOU WORRIED THAT YOUR FOOD WOULD RUN OUT BEFORE YOU GOT MONEY TO BUY MORE.: NEVER TRUE

## 2024-03-11 SDOH — ECONOMIC STABILITY: INCOME INSECURITY: HOW HARD IS IT FOR YOU TO PAY FOR THE VERY BASICS LIKE FOOD, HOUSING, MEDICAL CARE, AND HEATING?: NOT HARD AT ALL

## 2024-03-11 ASSESSMENT — PATIENT HEALTH QUESTIONNAIRE - PHQ9
SUM OF ALL RESPONSES TO PHQ QUESTIONS 1-9: 0
SUM OF ALL RESPONSES TO PHQ9 QUESTIONS 1 & 2: 0
2. FEELING DOWN, DEPRESSED OR HOPELESS: 0
SUM OF ALL RESPONSES TO PHQ QUESTIONS 1-9: 0
SUM OF ALL RESPONSES TO PHQ QUESTIONS 1-9: 0
1. LITTLE INTEREST OR PLEASURE IN DOING THINGS: 0
SUM OF ALL RESPONSES TO PHQ QUESTIONS 1-9: 0

## 2024-03-11 ASSESSMENT — ENCOUNTER SYMPTOMS
SINUS PAIN: 0
ABDOMINAL PAIN: 0
SORE THROAT: 0
SHORTNESS OF BREATH: 0
BACK PAIN: 0
VOMITING: 0
NAUSEA: 0
DIARRHEA: 0
EYE PAIN: 0
COUGH: 0

## 2024-03-11 NOTE — PROGRESS NOTES
[facility]  [facility]  [address] [facility] [address]  Dept: [phone]  Dept Fax: [phone]    Arturo Todd is a 58 y.o. male who presents today for his medicalconditions/complaints as noted below.  Arturo Todd is c/o of Diabetes (A1C drawn on 2/19)      HPI:     58-year-old male patient presents with complaints of follow up     Hx of NSTEMI, subsequent cath with stent placed per Dr. Vasquez. Medically managed with  aspirin, coreg.   BP and HR stable. Hyperlipidemia, CAD currently managed with atorvastatin 40, last lipid level stable, and liver enzymes stable. Has been following with [facility] cardiology. Doing well with exercise, Diet has not been great.     History of type 2 diabetes.  Currently taking Metformin 1000 daily, Last a1c 7.1  Has been actively working on diet and activity. Weight increased by 10 pounds. . Due for microalbumin             Past Medical History:   Diagnosis Date    DM (diabetes mellitus) (HCC)     Hyperlipidemia         Current Outpatient Medications   Medication Sig Dispense Refill    carvedilol (COREG) 6.25 MG tablet Take 1 tablet by mouth 2 times daily (with meals) with meals 60 tablet 5    metFORMIN (GLUCOPHAGE-XR) 500 MG extended release tablet Take 2 tablets by mouth daily (with breakfast) 180 tablet 0    sildenafil (VIAGRA) 25 MG tablet Take 1 tablet by mouth as needed for Erectile Dysfunction 30 tablet 0    aspirin 81 MG EC tablet Take 1 tablet by mouth daily 90 tablet 1    atorvastatin (LIPITOR) 40 MG tablet Take 1 tablet by mouth nightly 90 tablet 1     No current facility-administered medications for this visit.     No Known Allergies    Subjective:      Review of Systems    :Objective     Physical Exam  /62 (Site: Right Upper Arm, Position: Sitting, Cuff Size: Large Adult)   Pulse 98   Temp 97.1 °F (36.2 °C) (Tympanic)   Ht 1.727 m (5' 8\")   Wt 95.4 kg (210 lb 6.4 oz)   SpO2 94%   BMI 31.99 
Visit Information    Have you changed or started any medications since your last visit including any over-the-counter medicines, vitamins, or herbal medicines? no   Have you stopped taking any of your medications? Is so, why? -  no  Are you having any side effects from any of your medications? - no    Have you seen any other physician or provider since your last visit?  no   Have you had any other diagnostic tests since your last visit?  no   Have you been seen in the emergency room and/or had an admission in a hospital since we last saw you?  no   Have you had your routine dental cleaning in the past 6 months?  no     Do you have an active MyChart account? If no, what is the barrier?  Yes    Patient Care Team:  Abrahan Maria APRN - CNP as PCP - General (Certified Nurse Practitioner)  Abrahan Maria APRN - CNP as PCP - Empaneled Provider    Medical History Review  Past Medical, Family, and Social History reviewed and  contribute to the patient presenting condition    Health Maintenance   Topic Date Due    Hepatitis B vaccine (1 of 3 - 3-dose series) Never done    COVID-19 Vaccine (1) Never done    Pneumococcal 0-64 years Vaccine (1 - PCV) Never done    Diabetic Alb to Cr ratio (uACR) test  Never done    Flu vaccine (1) 08/01/2023    Diabetic foot exam  03/09/2024    Depression Screen  03/09/2024    A1C test (Diabetic or Prediabetic)  05/19/2024    DTaP/Tdap/Td vaccine (2 - Td or Tdap) 10/07/2024    Colorectal Cancer Screen  12/05/2024    Lipids  02/19/2025    GFR test (Diabetes, CKD 3-4, OR last GFR 15-59)  02/19/2025    Diabetic retinal exam  06/26/2025    Shingles vaccine  Completed    Hepatitis C screen  Completed    HIV screen  Completed    Hepatitis A vaccine  Aged Out    Hib vaccine  Aged Out    Polio vaccine  Aged Out    Meningococcal (ACWY) vaccine  Aged Out    Prostate Specific Antigen (PSA) Screening or Monitoring  Discontinued               
    Total Bilirubin 02/19/2024 0.6     Alkaline Phosphatase 02/19/2024 86     ALT 02/19/2024 32     AST 02/19/2024 20     TSH 02/19/2024 1.99     Cholesterol 02/19/2024 214 (H)     HDL 02/19/2024 52     LDL Cholesterol 02/19/2024 135 (H)     Chol/HDL Ratio 02/19/2024 4.0     Triglycerides 02/19/2024 138     VLDL 02/19/2024 28     PSA 02/19/2024 1.20     Hemoglobin A1C 02/19/2024 7.1 (H)     Estimated Avg Glucose 02/19/2024 157        Assessment and Plan      1. Hyperlipidemia, unspecified hyperlipidemia type  -     Lipid Panel; Future  -     ALT; Future  -     AST; Future  2. Diabetes mellitus without complication (HCC)  -     Hemoglobin A1C; Future  -     Microalbumin, Ur; Future       Lipid monitoring and A1c in 3 months  Due for urine micro    F/u 3 months, sooner prn          No results found for this visit on 03/11/24.          Return in about 3 months (around 6/11/2024), or if symptoms worsen or fail to improve.        No orders of the defined types were placed in this encounter.       Patient given educational materials - see patient instructions.Discussed use, benefit, and side effects of prescribed medications.  All patientquestions answered.  Pt voiced understanding.    Patient given educational materials - see patient instructions.Discussed use, benefit, and side effects of prescribed medications.  All patientquestions answered.  Pt voiced understanding.    This note was transcribed using dictation with Dragon services. Efforts were made to correct any errors but some words may be misinterpreted.    Patient assumes risks associated with failure to complete recommended testing and treatments in a timely manner    Electronically signed by CLARY Culp CNP on 3/11/2024at 8:07 AM

## 2024-04-21 ENCOUNTER — HOSPITAL ENCOUNTER (EMERGENCY)
Age: 59
Discharge: HOME | End: 2024-04-21
Attending: EMERGENCY MEDICINE

## 2024-04-21 VITALS
HEIGHT: 68 IN | SYSTOLIC BLOOD PRESSURE: 121 MMHG | DIASTOLIC BLOOD PRESSURE: 70 MMHG | WEIGHT: 210 LBS | OXYGEN SATURATION: 91 % | RESPIRATION RATE: 15 BRPM | BODY MASS INDEX: 31.83 KG/M2 | HEART RATE: 93 BPM

## 2024-04-21 DIAGNOSIS — R04.0 EPISTAXIS: Primary | ICD-10-CM

## 2024-04-21 PROCEDURE — 99283 EMERGENCY DEPT VISIT LOW MDM: CPT

## 2024-04-21 PROCEDURE — 2580000003 HC RX 258: Performed by: EMERGENCY MEDICINE

## 2024-04-21 PROCEDURE — 2500000003 HC RX 250 WO HCPCS: Performed by: EMERGENCY MEDICINE

## 2024-04-21 RX ORDER — WATER 10 ML/10ML
5 INJECTION INTRAMUSCULAR; INTRAVENOUS; SUBCUTANEOUS ONCE
Status: COMPLETED | OUTPATIENT
Start: 2024-04-21 | End: 2024-04-21

## 2024-04-21 RX ORDER — TRANEXAMIC ACID 100 MG/ML
500 INJECTION, SOLUTION INTRAVENOUS ONCE
Status: COMPLETED | OUTPATIENT
Start: 2024-04-21 | End: 2024-04-21

## 2024-04-21 RX ADMIN — WATER 5 ML: 1 INJECTION INTRAMUSCULAR; INTRAVENOUS; SUBCUTANEOUS at 05:27

## 2024-04-21 RX ADMIN — TRANEXAMIC ACID 500 MG: 100 INJECTION, SOLUTION INTRAVENOUS at 05:27

## 2024-04-21 NOTE — DISCHARGE INSTRUCTIONS
If you have recurrence of your nosebleed pinch your nose.  If you cannot get it to stop come back to the ER.

## 2024-04-21 NOTE — ED PROVIDER NOTES
Mood and Affect: Mood normal.         Thought Content: Thought content normal.         MEDICAL DECISION MAKING/ED Course:     58-year-old male presents to the ED for nosebleed from his left nostril for the past several hours.  On presentation patient is well-appearing stable vital signs, he does have some dried blood from his left nostril but no active bleeding.  I sprayed some intranasal TXA in his left nostril and patient was observed, he was in the ED for almost 2 hours without significant bleeding.  Patient was given return precautions, discharged in ED in stable condition with instructions to follow-up with PCP.    DIAGNOSTIC RESULTS     Vitals:    Vitals:    04/21/24 0519 04/21/24 0529 04/21/24 0539 04/21/24 0549   BP: 118/69 128/68  121/70   Pulse: 88 87 92 93   Resp: 14 16 14 15   SpO2: 90% 90%  91%   Weight:       Height:           The patient was given the following medications while in the emergency department:  Orders Placed This Encounter   Medications    tranexamic acid (CYKLOKAPRON) injection 500 mg    sterile water injection 5 mL     CONSULTS:  None    FINAL IMPRESSION      1. Epistaxis          DISPOSITION/PLAN   DISPOSITION Decision To Discharge 04/21/2024 06:14:45 AM      PATIENT REFERRED TO:  Abrahan Maria, APRN - [facility]  [address] [address]  [phone]    Schedule an appointment as soon as possible for a visit       DISCHARGE MEDICATIONS:  New Prescriptions    No medications on file     Sabi Youssef MD  Attending Emergency Physician                    Sabi Youssef MD  04/21/24 [id number]

## 2024-05-06 DIAGNOSIS — E11.9 DIABETES MELLITUS WITHOUT COMPLICATION (HCC): ICD-10-CM

## 2024-05-06 RX ORDER — METFORMIN HYDROCHLORIDE 500 MG/1
1000 TABLET, EXTENDED RELEASE ORAL
Qty: 180 TABLET | Refills: 0 | Status: SHIPPED | OUTPATIENT
Start: 2024-05-06

## 2024-05-30 ENCOUNTER — HOSPITAL ENCOUNTER (OUTPATIENT)
Age: 59
Setting detail: SPECIMEN
Discharge: HOME | End: 2024-05-30

## 2024-05-30 DIAGNOSIS — E78.5 HYPERLIPIDEMIA, UNSPECIFIED HYPERLIPIDEMIA TYPE: ICD-10-CM

## 2024-05-30 DIAGNOSIS — E11.9 DIABETES MELLITUS WITHOUT COMPLICATION (HCC): ICD-10-CM

## 2024-05-30 LAB
ALT SERPL-CCNC: 29 U/L (ref 10–50)
AST SERPL-CCNC: 23 U/L (ref 10–50)
CHOLEST SERPL-MCNC: 176 MG/DL (ref 0–199)
CHOLESTEROL/HDL RATIO: 4
CREAT UR-MCNC: 153 MG/DL (ref 39–259)
EST. AVERAGE GLUCOSE BLD GHB EST-MCNC: 177 MG/DL
HBA1C MFR BLD: 7.8 % (ref 4–6)
HDLC SERPL-MCNC: 44 MG/DL
LDLC SERPL CALC-MCNC: 104 MG/DL (ref 0–100)
MICROALBUMIN UR-MCNC: <12 MG/L (ref 0–20)
MICROALBUMIN/CREAT UR-RTO: NORMAL MCG/MG CREAT (ref 0–17)
TRIGL SERPL-MCNC: 141 MG/DL
VLDLC SERPL CALC-MCNC: 28 MG/DL

## 2024-06-11 ENCOUNTER — OFFICE VISIT (OUTPATIENT)
Dept: FAMILY MEDICINE CLINIC | Age: 59
End: 2024-06-11
Payer: COMMERCIAL

## 2024-06-11 VITALS
HEART RATE: 99 BPM | HEIGHT: 68 IN | DIASTOLIC BLOOD PRESSURE: 72 MMHG | BODY MASS INDEX: 32.13 KG/M2 | SYSTOLIC BLOOD PRESSURE: 130 MMHG | OXYGEN SATURATION: 98 % | WEIGHT: 212 LBS | TEMPERATURE: 97 F

## 2024-06-11 DIAGNOSIS — I21.4 NSTEMI (NON-ST ELEVATED MYOCARDIAL INFARCTION) (HCC): ICD-10-CM

## 2024-06-11 DIAGNOSIS — E11.9 DIABETES MELLITUS WITHOUT COMPLICATION (HCC): Primary | ICD-10-CM

## 2024-06-11 DIAGNOSIS — E78.5 HYPERLIPIDEMIA, UNSPECIFIED HYPERLIPIDEMIA TYPE: ICD-10-CM

## 2024-06-11 PROCEDURE — 3051F HG A1C>EQUAL 7.0%<8.0%: CPT | Performed by: NURSE PRACTITIONER

## 2024-06-11 PROCEDURE — 99213 OFFICE O/P EST LOW 20 MIN: CPT | Performed by: NURSE PRACTITIONER

## 2024-06-11 RX ORDER — METFORMIN HYDROCHLORIDE 500 MG/1
1000 TABLET, EXTENDED RELEASE ORAL 2 TIMES DAILY WITH MEALS
Qty: 360 TABLET | Refills: 1 | Status: SHIPPED | OUTPATIENT
Start: 2024-06-11

## 2024-06-11 ASSESSMENT — ENCOUNTER SYMPTOMS
SHORTNESS OF BREATH: 0
COUGH: 0
DIARRHEA: 0
ABDOMINAL PAIN: 0
SINUS PAIN: 0
NAUSEA: 0
BACK PAIN: 0
EYE PAIN: 0
SORE THROAT: 0
VOMITING: 0

## 2024-06-11 NOTE — PROGRESS NOTES
Visit Information    Have you changed or started any medications since your last visit including any over-the-counter medicines, vitamins, or herbal medicines? no   Have you stopped taking any of your medications? Is so, why? -  no  Are you having any side effects from any of your medications? - no    Have you seen any other physician or provider since your last visit?  no   Have you had any other diagnostic tests since your last visit?  no   Have you been seen in the emergency room and/or had an admission in a hospital since we last saw you?  no   Have you had your routine dental cleaning in the past 6 months?  no     Do you have an active MyChart account? If no, what is the barrier?  Yes    Patient Care Team:  Abrahan Maria APRN - CNP as PCP - General (Certified Nurse Practitioner)  Abrahan Maria APRN - CNP as PCP - Empaneled Provider    Medical History Review  Past Medical, Family, and Social History reviewed and  contribute to the patient presenting condition    Health Maintenance   Topic Date Due    Hepatitis B vaccine (1 of 3 - 3-dose series) Never done    COVID-19 Vaccine (1) Never done    Pneumococcal 0-64 years Vaccine (1 of 2 - PCV) Never done    Diabetic foot exam  03/09/2024    Flu vaccine (Season Ended) 03/11/2025 (Originally 8/1/2024)    A1C test (Diabetic or Prediabetic)  08/30/2024    DTaP/Tdap/Td vaccine (2 - Td or Tdap) 10/07/2024    Colorectal Cancer Screen  12/05/2024    GFR test (Diabetes, CKD 3-4, OR last GFR 15-59)  02/19/2025    Depression Screen  03/11/2025    Diabetic Alb to Cr ratio (uACR) test  05/30/2025    Lipids  05/30/2025    Diabetic retinal exam  06/26/2025    Shingles vaccine  Completed    Hepatitis C screen  Completed    HIV screen  Completed    Hepatitis A vaccine  Aged Out    Hib vaccine  Aged Out    Polio vaccine  Aged Out    Meningococcal (ACWY) vaccine  Aged Out    Prostate Specific Antigen (PSA) Screening or Monitoring  Discontinued

## 2024-06-11 NOTE — PROGRESS NOTES
[facility]  [facility]  [address] [facility] [address]  Dept: [phone]  Dept Fax: [phone]    Arturo Todd is a 58 y.o. male who presents today for his medicalconditions/complaints as noted below.  Arturo Todd is c/o of Diabetes (A1C drawn on 5/30)      HPI:     58-year-old male patient presents with complaints of follow up     Hx of NSTEMI, CAD, follows with [facility] Dr. Vasquez. Medically managed with  aspirin, coreg 6.2 bid.   BP and HR stable. Hyperlipidemia, CAD currently managed with atorvastatin 40, last lipid level improved, and liver enzymes stable.      History of type 2 diabetes.  Currently taking Metformin 1000 daily increased to 1000 bid, last A1c 7.1 today 7.8  Has been actively working on diet and activity. Weight stable. Urine micro negative.           Past Medical History:   Diagnosis Date    DM (diabetes mellitus) (HCC)     Hyperlipidemia     MI (myocardial infarction) (HCC)         Current Outpatient Medications   Medication Sig Dispense Refill    metFORMIN (GLUCOPHAGE-XR) 500 MG extended release tablet Take 2 tablets by mouth 2 times daily (with meals) 360 tablet 1    carvedilol (COREG) 6.25 MG tablet Take 1 tablet by mouth 2 times daily (with meals) with meals 60 tablet 5    aspirin 81 MG EC tablet Take 1 tablet by mouth daily 90 tablet 1    atorvastatin (LIPITOR) 40 MG tablet Take 1 tablet by mouth nightly 90 tablet 1    sildenafil (VIAGRA) 25 MG tablet Take 1 tablet by mouth as needed for Erectile Dysfunction (Patient not taking: Reported on 4/21/2024) 30 tablet 0     No current facility-administered medications for this visit.     No Known Allergies    Subjective:      Review of Systems   Constitutional:  Negative for chills and fatigue.   HENT:  Negative for congestion, ear pain, sinus pain and sore throat.    Eyes:  Negative for pain and visual disturbance.   Respiratory:  Negative for cough and

## 2024-06-11 NOTE — PATIENT INSTRUCTIONS
Patient Education        Learning About Type 2 Diabetes  What is type 2 diabetes?     Type 2 diabetes is a condition in which you have too much sugar (glucose) in your blood. Glucose is a type of sugar produced in your body when carbohydrates and other foods are digested. It provides energy to cells throughout the body.  Normally, blood sugar levels increase after you eat a meal. When blood sugar rises, cells in the pancreas release insulin, which causes the body to absorb sugar from the blood and lowers the blood sugar level to normal.  When you have type 2 diabetes, sugar stays in the blood rather than entering the body's cells to be used for energy. This results in high blood sugar. It happens when your body can't use insulin the right way.  Over time, high blood sugar can harm many parts of the body, such as your eyes, heart, blood vessels, nerves, and kidneys. It can also increase your risk for other health problems (complications).  What can you expect with type 2 diabetes?  You'll keep hearing about how important it is to keep your blood sugar within a target range. That's because over time, high blood sugar can lead to serious problems. It can:  Harm your eyes, nerves, and kidneys.  Damage your blood vessels, leading to heart disease and stroke.  Reduce blood flow and cause nerve damage to parts of your body, especially your feet. This can cause slow healing and pain when you walk.  Make your immune system weak and less able to fight infections.  When people hear the word \"diabetes,\" they often think of problems like these. But daily care and treatment can help prevent or delay these problems. The goal is to keep your blood sugar in a target range. That's the best way to reduce your chance of having more problems from diabetes.  What are the symptoms?  Some people who have type 2 diabetes may not have any symptoms early on. Many people with the disease don't even know they have it at first. But with time, 
normal/clear to auscultation bilaterally/no wheezes/no rales/no rhonchi/no respiratory distress/no use of accessory muscles

## 2024-07-17 DIAGNOSIS — I21.4 NSTEMI (NON-ST ELEVATED MYOCARDIAL INFARCTION) (HCC): ICD-10-CM

## 2024-07-17 RX ORDER — CARVEDILOL 6.25 MG/1
6.25 TABLET ORAL 2 TIMES DAILY WITH MEALS
Qty: 60 TABLET | Refills: 5 | Status: SHIPPED | OUTPATIENT
Start: 2024-07-17

## 2024-08-23 ENCOUNTER — OFFICE VISIT (OUTPATIENT)
Dept: PRIMARY CARE CLINIC | Age: 59
End: 2024-08-23

## 2024-08-23 VITALS
OXYGEN SATURATION: 96 % | HEIGHT: 68 IN | HEART RATE: 97 BPM | DIASTOLIC BLOOD PRESSURE: 87 MMHG | WEIGHT: 210 LBS | TEMPERATURE: 98.4 F | SYSTOLIC BLOOD PRESSURE: 138 MMHG | BODY MASS INDEX: 31.83 KG/M2

## 2024-08-23 DIAGNOSIS — S61.211A LACERATION OF LEFT INDEX FINGER WITHOUT FOREIGN BODY WITHOUT DAMAGE TO NAIL, INITIAL ENCOUNTER: Primary | ICD-10-CM

## 2024-08-23 ASSESSMENT — ENCOUNTER SYMPTOMS
CHEST TIGHTNESS: 0
COUGH: 0
RESPIRATORY NEGATIVE: 1
WHEEZING: 0
SHORTNESS OF BREATH: 0

## 2024-08-23 NOTE — PROGRESS NOTES
[facility] IN CARE  [address]  [facility] [address]  Dept: [phone]  Dept Fax: [phone]     Arturo Todd is a 59 y.o. male who presents to the urgent care today for his medicalconditions/complaints as noted below.  Arturo Todd is c/o of Hand Injury (Left pointer finger cut with metal )    HPI:      Laceration   The incident occurred 1 to 3 hours ago. Pain location: left index finger. The laceration is 1 cm in size. The laceration mechanism was a metal edge. The pain is mild. He reports no foreign bodies present. His tetanus status is out of date (2014).     Last Tdap was approximately 10 years ago.    Past Medical History:   Diagnosis Date    DM (diabetes mellitus) (HCC)     Hyperlipidemia     MI (myocardial infarction) (HCC)        Current Outpatient Medications   Medication Sig Dispense Refill    carvedilol (COREG) 6.25 MG tablet Take 1 tablet by mouth 2 times daily (with meals) with meals 60 tablet 5    metFORMIN (GLUCOPHAGE-XR) 500 MG extended release tablet Take 2 tablets by mouth 2 times daily (with meals) 360 tablet 1    aspirin 81 MG EC tablet Take 1 tablet by mouth daily 90 tablet 1    atorvastatin (LIPITOR) 40 MG tablet Take 1 tablet by mouth nightly 90 tablet 1    sildenafil (VIAGRA) 25 MG tablet Take 1 tablet by mouth as needed for Erectile Dysfunction (Patient not taking: Reported on 4/21/2024) 30 tablet 0     No current facility-administered medications for this visit.     No Known Allergies    Reviewed PMH, SH, and FH with the patient and updated.    Subjective:      Review of Systems   Constitutional:  Negative for chills, fatigue and fever.   Respiratory: Negative.  Negative for cough, chest tightness, shortness of breath and wheezing.    Cardiovascular: Negative.  Negative for chest pain.   Skin:  Positive for wound (left index finger). Negative for rash.   All other systems reviewed and are

## 2024-09-10 ENCOUNTER — OFFICE VISIT (OUTPATIENT)
Dept: FAMILY MEDICINE CLINIC | Age: 59
End: 2024-09-10
Payer: COMMERCIAL

## 2024-09-10 VITALS
SYSTOLIC BLOOD PRESSURE: 110 MMHG | HEART RATE: 89 BPM | BODY MASS INDEX: 32.74 KG/M2 | TEMPERATURE: 97 F | WEIGHT: 216 LBS | HEIGHT: 68 IN | DIASTOLIC BLOOD PRESSURE: 68 MMHG | OXYGEN SATURATION: 95 %

## 2024-09-10 DIAGNOSIS — S49.92XA INJURY OF LEFT SHOULDER, INITIAL ENCOUNTER: ICD-10-CM

## 2024-09-10 DIAGNOSIS — E11.9 DIABETES MELLITUS WITHOUT COMPLICATION (HCC): Primary | ICD-10-CM

## 2024-09-10 DIAGNOSIS — I21.4 NSTEMI (NON-ST ELEVATED MYOCARDIAL INFARCTION) (HCC): ICD-10-CM

## 2024-09-10 DIAGNOSIS — E78.5 HYPERLIPIDEMIA, UNSPECIFIED HYPERLIPIDEMIA TYPE: ICD-10-CM

## 2024-09-10 LAB — HBA1C MFR BLD: 8.7 %

## 2024-09-10 PROCEDURE — 99214 OFFICE O/P EST MOD 30 MIN: CPT | Performed by: NURSE PRACTITIONER

## 2024-09-10 PROCEDURE — 83036 HEMOGLOBIN GLYCOSYLATED A1C: CPT | Performed by: NURSE PRACTITIONER

## 2024-09-10 PROCEDURE — 3052F HG A1C>EQUAL 8.0%<EQUAL 9.0%: CPT | Performed by: NURSE PRACTITIONER

## 2024-09-10 RX ORDER — MELOXICAM 15 MG/1
15 TABLET ORAL DAILY
Qty: 30 TABLET | Refills: 0 | Status: SHIPPED | OUTPATIENT
Start: 2024-09-10

## 2024-09-10 ASSESSMENT — ENCOUNTER SYMPTOMS
COUGH: 0
ABDOMINAL PAIN: 0
DIARRHEA: 0
NAUSEA: 0
VOMITING: 0
SORE THROAT: 0
EYE PAIN: 0
SINUS PAIN: 0
BACK PAIN: 0
SHORTNESS OF BREATH: 0

## 2024-11-21 ENCOUNTER — OFFICE VISIT (OUTPATIENT)
Dept: FAMILY MEDICINE CLINIC | Age: 59
End: 2024-11-21
Payer: COMMERCIAL

## 2024-11-21 ENCOUNTER — HOSPITAL ENCOUNTER (OUTPATIENT)
Age: 59
Setting detail: SPECIMEN
Discharge: HOME | End: 2024-11-21

## 2024-11-21 VITALS
WEIGHT: 205 LBS | BODY MASS INDEX: 31.07 KG/M2 | TEMPERATURE: 97.1 F | HEART RATE: 76 BPM | SYSTOLIC BLOOD PRESSURE: 112 MMHG | DIASTOLIC BLOOD PRESSURE: 72 MMHG | OXYGEN SATURATION: 95 % | HEIGHT: 68 IN

## 2024-11-21 DIAGNOSIS — R10.13 DYSPEPSIA: Primary | ICD-10-CM

## 2024-11-21 DIAGNOSIS — R07.89 CHEST PRESSURE: ICD-10-CM

## 2024-11-21 DIAGNOSIS — R07.89 CHEST PRESSURE: Primary | ICD-10-CM

## 2024-11-21 LAB
ANION GAP SERPL CALCULATED.3IONS-SCNC: 9 MMOL/L (ref 9–16)
BUN SERPL-MCNC: 17 MG/DL (ref 8–23)
CALCIUM SERPL-MCNC: 9.6 MG/DL (ref 8.6–10.4)
CHLORIDE SERPL-SCNC: 104 MMOL/L (ref 98–107)
CO2 SERPL-SCNC: 27 MMOL/L (ref 20–31)
CREAT SERPL-MCNC: 1 MG/DL (ref 0.7–1.2)
D DIMER PPP FEU-MCNC: 0.32 UG/ML FEU (ref 0–0.57)
ERYTHROCYTE [DISTWIDTH] IN BLOOD BY AUTOMATED COUNT: 12.4 % (ref 11.8–14.4)
GFR, ESTIMATED: 86 ML/MIN/1.73M2
GLUCOSE SERPL-MCNC: 161 MG/DL (ref 74–99)
HCT VFR BLD AUTO: 47.6 % (ref 40.7–50.3)
HGB BLD-MCNC: 15.7 G/DL (ref 13–17)
MCH RBC QN AUTO: 29.3 PG (ref 25.2–33.5)
MCHC RBC AUTO-ENTMCNC: 33 G/DL (ref 28.4–34.8)
MCV RBC AUTO: 89 FL (ref 82.6–102.9)
NRBC BLD-RTO: 0 PER 100 WBC
PLATELET # BLD AUTO: 206 K/UL (ref 138–453)
PMV BLD AUTO: 10.3 FL (ref 8.1–13.5)
POTASSIUM SERPL-SCNC: 4.2 MMOL/L (ref 3.7–5.3)
RBC # BLD AUTO: 5.35 M/UL (ref 4.21–5.77)
SODIUM SERPL-SCNC: 140 MMOL/L (ref 136–145)
TROPONIN I SERPL HS-MCNC: 7 NG/L (ref 0–22)
TSH SERPL DL<=0.05 MIU/L-ACNC: 2.36 UIU/ML (ref 0.27–4.2)
WBC OTHER # BLD: 6.5 K/UL (ref 3.5–11.3)

## 2024-11-21 PROCEDURE — 93000 ELECTROCARDIOGRAM COMPLETE: CPT | Performed by: NURSE PRACTITIONER

## 2024-11-21 PROCEDURE — 99213 OFFICE O/P EST LOW 20 MIN: CPT | Performed by: NURSE PRACTITIONER

## 2024-11-21 RX ORDER — PANTOPRAZOLE SODIUM 20 MG/1
20 TABLET, DELAYED RELEASE ORAL
Qty: 30 TABLET | Refills: 0 | Status: SHIPPED | OUTPATIENT
Start: 2024-11-21

## 2024-11-21 ASSESSMENT — ENCOUNTER SYMPTOMS
SORE THROAT: 0
DIARRHEA: 0
SHORTNESS OF BREATH: 0
ABDOMINAL PAIN: 0
EYE PAIN: 0
COUGH: 0
BACK PAIN: 0
SINUS PAIN: 0
NAUSEA: 0
VOMITING: 0

## 2024-11-21 NOTE — PROGRESS NOTES
[facility]  [facility]  [address] [address]  Dept: [phone]  Dept Fax: [phone]    Arturo Todd is a 59 y.o. male who presents today for his medicalconditions/complaints as noted below.  Arturo Todd is c/o of Heartburn (X's 1 week)      HPI:       59-year-old male patient presents with concern for heartburn.  Patient reports for the past 1 week he has felt \"heartburn\".  Reports that he feels a discomfort to the epigastric region.  Rates this as a 1 out of 10 and constant.  Denies shortness of breath or difficulty breathing.  Vital stable.  Patient does have a significant history of CAD, hypertension, hyperlipidemia, diabetes and prior MI.  Patient follows with [facility] cardiology.        Past Medical History:   Diagnosis Date    DM (diabetes mellitus) ([facility])     Hyperlipidemia     MI (myocardial infarction) ([facility])         Current Outpatient Medications   Medication Sig Dispense Refill    meloxicam (MOBIC) 15 MG tablet Take 1 tablet by mouth daily 30 tablet 0    empagliflozin (JARDIANCE) 25 MG tablet Take 1 tablet by mouth daily 90 tablet 1    carvedilol (COREG) 6.25 MG tablet Take 1 tablet by mouth 2 times daily (with meals) with meals 60 tablet 5    metFORMIN (GLUCOPHAGE-XR) 500 MG extended release tablet Take 2 tablets by mouth 2 times daily (with meals) 360 tablet 1    aspirin 81 MG EC tablet Take 1 tablet by mouth daily 90 tablet 1    atorvastatin (LIPITOR) 40 MG tablet Take 1 tablet by mouth nightly 90 tablet 1    sildenafil (VIAGRA) 25 MG tablet Take 1 tablet by mouth as needed for Erectile Dysfunction (Patient not taking: Reported on 4/21/2024) 30 tablet 0     No current facility-administered medications for this visit.     No Known Allergies    Subjective:      Review of Systems   Constitutional:  Negative for chills and fatigue.   HENT:  Negative for congestion, ear pain, sinus pain and sore throat.    Eyes:  Negative for pain and visual 
Monitoring  Discontinued

## 2024-12-09 ENCOUNTER — LAB (OUTPATIENT)
Dept: PRIMARY CARE CLINIC | Age: 59
End: 2024-12-09

## 2024-12-09 DIAGNOSIS — S49.92XA INJURY OF LEFT SHOULDER, INITIAL ENCOUNTER: Primary | ICD-10-CM

## 2024-12-12 ENCOUNTER — OFFICE VISIT (OUTPATIENT)
Dept: FAMILY MEDICINE CLINIC | Age: 59
End: 2024-12-12
Payer: COMMERCIAL

## 2024-12-12 VITALS
HEIGHT: 68 IN | OXYGEN SATURATION: 92 % | WEIGHT: 204 LBS | TEMPERATURE: 97 F | SYSTOLIC BLOOD PRESSURE: 112 MMHG | HEART RATE: 79 BPM | BODY MASS INDEX: 30.92 KG/M2 | DIASTOLIC BLOOD PRESSURE: 72 MMHG

## 2024-12-12 DIAGNOSIS — S49.92XA INJURY OF LEFT SHOULDER, INITIAL ENCOUNTER: ICD-10-CM

## 2024-12-12 DIAGNOSIS — Z12.11 COLON CANCER SCREENING: ICD-10-CM

## 2024-12-12 DIAGNOSIS — E11.9 DIABETES MELLITUS WITHOUT COMPLICATION (HCC): Primary | ICD-10-CM

## 2024-12-12 LAB — HBA1C MFR BLD: 7.2 %

## 2024-12-12 PROCEDURE — 99214 OFFICE O/P EST MOD 30 MIN: CPT | Performed by: NURSE PRACTITIONER

## 2024-12-12 PROCEDURE — 83036 HEMOGLOBIN GLYCOSYLATED A1C: CPT | Performed by: NURSE PRACTITIONER

## 2024-12-12 PROCEDURE — 3051F HG A1C>EQUAL 7.0%<8.0%: CPT | Performed by: NURSE PRACTITIONER

## 2024-12-12 ASSESSMENT — ENCOUNTER SYMPTOMS
SINUS PAIN: 0
DIARRHEA: 0
SHORTNESS OF BREATH: 0
SORE THROAT: 0
BACK PAIN: 0
VOMITING: 0
NAUSEA: 0
COUGH: 0
ABDOMINAL PAIN: 0
EYE PAIN: 0

## 2024-12-12 NOTE — PROGRESS NOTES
[facility]  [facility]  [address] [address]  Dept: [phone]  Dept Fax: [phone]    Arturo Todd is a 59 y.o. male who presents today for his medicalconditions/complaints as noted below.  Arturo Todd is c/o of Shoulder Injury (Left shoulder. Fall while sliding down a mountain. ), Ear Fullness (Right ear), and Diabetes      HPI:     59-year-old male patient presents with complaints of follow up     Hx of NSTEMI, CAD, follows with [facility] Cardiology Dr. Vasquez. Medically managed with  aspirin, coreg 6.2 bid.   BP and HR stable. Hyperlipidemia, CAD currently managed with atorvastatin 40, last lipid level improved, and liver enzymes stable. Recent episode of chest pain - negative labs including negative trop and dimer, pain has resolved.      History of type 2 diabetes.  Currently taking Metformin 1000 bid,  jardiance 25.  last A1c 8.7 today 7.2.  Has been actively working on diet and activity. Urine micro negative.      Fall while sliding down mountain, injured left shoulder,  felt pain immediately, restricted range of motion, slight improvement, no hx or surgery. Xrays are in process - will start PT    Right ear fullness - evidence of cerumen impaction                 Past Medical History:   Diagnosis Date    DM (diabetes mellitus) (HCC)     Hyperlipidemia     MI (myocardial infarction) (HCC)         Current Outpatient Medications   Medication Sig Dispense Refill    meloxicam (MOBIC) 15 MG tablet Take 1 tablet by mouth daily 30 tablet 0    empagliflozin (JARDIANCE) 25 MG tablet Take 1 tablet by mouth daily 90 tablet 1    carvedilol (COREG) 6.25 MG tablet Take 1 tablet by mouth 2 times daily (with meals) with meals 60 tablet 5    metFORMIN (GLUCOPHAGE-XR) 500 MG extended release tablet Take 2 tablets by mouth 2 times daily (with meals) 360 tablet 1    aspirin 81 MG EC tablet Take 1 tablet by mouth daily 90 tablet 1    atorvastatin (LIPITOR) 40 MG

## 2024-12-12 NOTE — PROGRESS NOTES
Visit Information    Have you changed or started any medications since your last visit including any over-the-counter medicines, vitamins, or herbal medicines? no   Have you stopped taking any of your medications? Is so, why? -  no  Are you having any side effects from any of your medications? - no    Have you seen any other physician or provider since your last visit?  no   Have you had any other diagnostic tests since your last visit?  no   Have you been seen in the emergency room and/or had an admission in a hospital since we last saw you?  no   Have you had your routine dental cleaning in the past 6 months?  no     Do you have an active MyChart account? If no, what is the barrier?  Yes    Patient Care Team:  Abrahan Maria APRN - CNP as PCP - General (Certified Nurse Practitioner)  Abrahan Maria APRN - CNP as PCP - Empaneled Provider    Medical History Review  Past Medical, Family, and Social History reviewed and  contribute to the patient presenting condition    Health Maintenance   Topic Date Due    Pneumococcal 0-64 years Vaccine (1 of 2 - PCV) Never done    Diabetic foot exam  03/09/2024    COVID-19 Vaccine (1 - 2023-24 season) Never done    Colorectal Cancer Screen  12/05/2024    A1C test (Diabetic or Prediabetic)  12/10/2024    Flu vaccine (1) 11/21/2025 (Originally 8/1/2024)    Depression Screen  03/11/2025    Diabetic Alb to Cr ratio (uACR) test  05/30/2025    Lipids  05/30/2025    Diabetic retinal exam  06/26/2025    GFR test (Diabetes, CKD 3-4, OR last GFR 15-59)  11/21/2025    DTaP/Tdap/Td vaccine (3 - Td or Tdap) 08/23/2034    Respiratory Syncytial Virus (RSV) Pregnant or age 60 yrs+ (1 - 1-dose 75+ series) 06/20/2039    Shingles vaccine  Completed    Hepatitis C screen  Completed    HIV screen  Completed    Hepatitis A vaccine  Aged Out    Hepatitis B vaccine  Aged Out    Hib vaccine  Aged Out    Polio vaccine  Aged Out    Meningococcal (ACWY) vaccine  Aged Out    Prostate Specific

## 2025-01-08 LAB — NONINV COLON CA DNA+OCC BLD SCRN STL QL: NEGATIVE

## 2025-01-20 DIAGNOSIS — I21.4 NSTEMI (NON-ST ELEVATED MYOCARDIAL INFARCTION) (HCC): ICD-10-CM

## 2025-01-20 RX ORDER — CARVEDILOL 6.25 MG/1
6.25 TABLET ORAL 2 TIMES DAILY WITH MEALS
Qty: 60 TABLET | Refills: 5 | Status: SHIPPED | OUTPATIENT
Start: 2025-01-20

## 2025-01-27 DIAGNOSIS — E11.9 DIABETES MELLITUS WITHOUT COMPLICATION (HCC): ICD-10-CM

## 2025-01-27 RX ORDER — METFORMIN HYDROCHLORIDE 500 MG/1
1000 TABLET, EXTENDED RELEASE ORAL 2 TIMES DAILY WITH MEALS
Qty: 360 TABLET | Refills: 1 | Status: SHIPPED | OUTPATIENT
Start: 2025-01-27

## 2025-02-19 ENCOUNTER — OFFICE VISIT (OUTPATIENT)
Dept: FAMILY MEDICINE CLINIC | Age: 60
End: 2025-02-19

## 2025-02-19 VITALS
HEIGHT: 68 IN | SYSTOLIC BLOOD PRESSURE: 136 MMHG | WEIGHT: 206.8 LBS | OXYGEN SATURATION: 96 % | TEMPERATURE: 98 F | HEART RATE: 100 BPM | DIASTOLIC BLOOD PRESSURE: 80 MMHG | BODY MASS INDEX: 31.34 KG/M2

## 2025-02-19 DIAGNOSIS — R09.81 HEAD CONGESTION: Primary | ICD-10-CM

## 2025-02-19 DIAGNOSIS — R05.9 COUGH, UNSPECIFIED TYPE: ICD-10-CM

## 2025-02-19 LAB
Lab: NORMAL
PERFORMING INSTRUMENT: NORMAL
QC PASS/FAIL: POSITIVE
SARS-COV-2, POC: NORMAL

## 2025-02-19 RX ORDER — BENZONATATE 200 MG/1
200 CAPSULE ORAL 3 TIMES DAILY PRN
Qty: 30 CAPSULE | Refills: 0 | Status: SHIPPED | OUTPATIENT
Start: 2025-02-19 | End: 2025-03-01

## 2025-02-19 SDOH — ECONOMIC STABILITY: FOOD INSECURITY: WITHIN THE PAST 12 MONTHS, YOU WORRIED THAT YOUR FOOD WOULD RUN OUT BEFORE YOU GOT MONEY TO BUY MORE.: NEVER TRUE

## 2025-02-19 SDOH — ECONOMIC STABILITY: FOOD INSECURITY: WITHIN THE PAST 12 MONTHS, THE FOOD YOU BOUGHT JUST DIDN'T LAST AND YOU DIDN'T HAVE MONEY TO GET MORE.: NEVER TRUE

## 2025-02-19 ASSESSMENT — PATIENT HEALTH QUESTIONNAIRE - PHQ9
2. FEELING DOWN, DEPRESSED OR HOPELESS: NOT AT ALL
SUM OF ALL RESPONSES TO PHQ QUESTIONS 1-9: 0
SUM OF ALL RESPONSES TO PHQ9 QUESTIONS 1 & 2: 0
SUM OF ALL RESPONSES TO PHQ QUESTIONS 1-9: 0
1. LITTLE INTEREST OR PLEASURE IN DOING THINGS: NOT AT ALL

## 2025-02-19 ASSESSMENT — ENCOUNTER SYMPTOMS
VOMITING: 0
BACK PAIN: 0
ABDOMINAL PAIN: 0
SORE THROAT: 1
COUGH: 1
RHINORRHEA: 1
SHORTNESS OF BREATH: 0
EYE PAIN: 0
SINUS PAIN: 0
NAUSEA: 0
DIARRHEA: 0

## 2025-02-19 NOTE — PROGRESS NOTES
[facility]  [facility]  [address] [address]  Dept: [phone]  Dept Fax: [phone]    Arturo Todd is a 59 y.o. male who presents today for his medicalconditions/complaints as noted below.  Arturo Todd is c/o of Cough (Dry cough x's 3 days ) and Congestion (Head and chest congestion. Taking mucinex DM and tylenol. Did have a fever on Monday. Does need a note for work. )      HPI:       59-year-old male patient presents with concern for cough congestion.  Patient reports symptoms started approximately 3 days ago.  Reports low-grade fever, body aches, chills, dry cough, congestion, rhinorrhea.  Reports scratchy sore throat, some ear pressure.  No chest discomfort or difficulty breathing.  No specific sick contacts.  Has been treating with Mucinex, cough drops        Past Medical History:   Diagnosis Date    DM (diabetes mellitus) ([facility])     Hyperlipidemia     MI (myocardial infarction) ([facility])         Current Outpatient Medications   Medication Sig Dispense Refill    benzonatate (TESSALON) 200 MG capsule Take 1 capsule by mouth 3 times daily as needed for Cough 30 capsule 0    metFORMIN (GLUCOPHAGE-XR) 500 MG extended release tablet Take 2 tablets by mouth 2 times daily (with meals) 360 tablet 1    carvedilol (COREG) 6.25 MG tablet Take 1 tablet by mouth 2 times daily (with meals) with meals 60 tablet 5    meloxicam (MOBIC) 15 MG tablet Take 1 tablet by mouth daily 30 tablet 0    empagliflozin (JARDIANCE) 25 MG tablet Take 1 tablet by mouth daily 90 tablet 1    aspirin 81 MG EC tablet Take 1 tablet by mouth daily 90 tablet 1    atorvastatin (LIPITOR) 40 MG tablet Take 1 tablet by mouth nightly 90 tablet 1    sildenafil (VIAGRA) 25 MG tablet Take 1 tablet by mouth as needed for Erectile Dysfunction 30 tablet 0     No current facility-administered medications for this visit.     No Known Allergies    Subjective:      Review of Systems   Constitutional:  Positive

## 2025-03-13 ENCOUNTER — OFFICE VISIT (OUTPATIENT)
Dept: FAMILY MEDICINE CLINIC | Age: 60
End: 2025-03-13
Payer: COMMERCIAL

## 2025-03-13 VITALS
SYSTOLIC BLOOD PRESSURE: 124 MMHG | HEART RATE: 74 BPM | WEIGHT: 204.6 LBS | BODY MASS INDEX: 31.11 KG/M2 | OXYGEN SATURATION: 96 % | DIASTOLIC BLOOD PRESSURE: 62 MMHG

## 2025-03-13 DIAGNOSIS — E11.9 DIABETES MELLITUS WITHOUT COMPLICATION (HCC): ICD-10-CM

## 2025-03-13 DIAGNOSIS — I21.4 NSTEMI (NON-ST ELEVATED MYOCARDIAL INFARCTION) (HCC): Primary | ICD-10-CM

## 2025-03-13 DIAGNOSIS — Z12.5 PROSTATE CANCER SCREENING: ICD-10-CM

## 2025-03-13 DIAGNOSIS — E78.5 HYPERLIPIDEMIA, UNSPECIFIED HYPERLIPIDEMIA TYPE: ICD-10-CM

## 2025-03-13 LAB — HBA1C MFR BLD: 8 %

## 2025-03-13 PROCEDURE — 3052F HG A1C>EQUAL 8.0%<EQUAL 9.0%: CPT | Performed by: NURSE PRACTITIONER

## 2025-03-13 PROCEDURE — 83036 HEMOGLOBIN GLYCOSYLATED A1C: CPT | Performed by: NURSE PRACTITIONER

## 2025-03-13 PROCEDURE — 99214 OFFICE O/P EST MOD 30 MIN: CPT | Performed by: NURSE PRACTITIONER

## 2025-03-13 ASSESSMENT — ENCOUNTER SYMPTOMS
VOMITING: 0
DIARRHEA: 0
SINUS PAIN: 0
BACK PAIN: 0
SHORTNESS OF BREATH: 0
EYE PAIN: 0
COUGH: 0
NAUSEA: 0
SORE THROAT: 0
ABDOMINAL PAIN: 0

## 2025-03-13 NOTE — PROGRESS NOTES
Review of Systems   Constitutional:  Negative for chills and fatigue.   HENT:  Negative for congestion, ear pain, sinus pain and sore throat.    Eyes:  Negative for pain and visual disturbance.   Respiratory:  Negative for cough and shortness of breath.    Cardiovascular:  Negative for chest pain and palpitations.   Gastrointestinal:  Negative for abdominal pain, diarrhea, nausea and vomiting.   Genitourinary:  Negative for penile pain and testicular pain.   Musculoskeletal:  Negative for back pain, joint swelling and neck pain.   Skin:  Negative for rash.   Neurological:  Negative for dizziness and light-headedness.   Hematological:  Does not bruise/bleed easily.   All other systems reviewed and are negative.      :Objective     Physical Exam  Vitals and nursing note reviewed.   Constitutional:       General: He is not in acute distress.     Appearance: Normal appearance. He is not toxic-appearing.   Cardiovascular:      Rate and Rhythm: Normal rate.   Pulmonary:      Effort: Pulmonary effort is normal.      Breath sounds: Normal breath sounds.   Feet:      Comments: Diabetic Foot Exam  -Amputation toes/ limbs? none  -Color? wnl  -Hair on feet/ toes? present  -Skin abnormalities? wnl  -Nail abnormalities? wnl  -Vascular status pulse/ cap refill? wnl  -Monofilament? wnl        Neurological:      General: No focal deficit present.      Mental Status: He is alert and oriented to person, place, and time.       /62 (BP Site: Right Upper Arm)   Pulse 74   Wt 92.8 kg (204 lb 9.6 oz)   SpO2 96%   BMI 31.11 kg/m²     Lab Review   Office Visit on 02/19/2025   Component Date Value    SARS-COV-2, POC 02/19/2025 Not-Detected     Lot Number 02/19/2025 [id number]     QC Pass/Fail 02/19/2025 POSITIVE     Performing Instrument 02/19/2025 BD Veritor        Assessment and Plan   Assessment & Plan   1. NSTEMI (non-ST elevated myocardial infarction) (HCC)  2. Hyperlipidemia, unspecified hyperlipidemia type  -     Lipid Panel;

## 2025-06-16 ENCOUNTER — OFFICE VISIT (OUTPATIENT)
Dept: FAMILY MEDICINE CLINIC | Age: 60
End: 2025-06-16
Payer: COMMERCIAL

## 2025-06-16 VITALS
OXYGEN SATURATION: 97 % | HEART RATE: 65 BPM | BODY MASS INDEX: 30.56 KG/M2 | DIASTOLIC BLOOD PRESSURE: 72 MMHG | SYSTOLIC BLOOD PRESSURE: 118 MMHG | WEIGHT: 201 LBS

## 2025-06-16 DIAGNOSIS — I21.4 NSTEMI (NON-ST ELEVATED MYOCARDIAL INFARCTION) (HCC): ICD-10-CM

## 2025-06-16 DIAGNOSIS — Z12.5 PROSTATE CANCER SCREENING: ICD-10-CM

## 2025-06-16 DIAGNOSIS — E78.5 HYPERLIPIDEMIA, UNSPECIFIED HYPERLIPIDEMIA TYPE: ICD-10-CM

## 2025-06-16 DIAGNOSIS — E11.9 DIABETES MELLITUS WITHOUT COMPLICATION (HCC): Primary | ICD-10-CM

## 2025-06-16 LAB — HBA1C MFR BLD: 6.8 %

## 2025-06-16 PROCEDURE — 83036 HEMOGLOBIN GLYCOSYLATED A1C: CPT | Performed by: NURSE PRACTITIONER

## 2025-06-16 PROCEDURE — 99214 OFFICE O/P EST MOD 30 MIN: CPT | Performed by: NURSE PRACTITIONER

## 2025-06-16 PROCEDURE — 3044F HG A1C LEVEL LT 7.0%: CPT | Performed by: NURSE PRACTITIONER

## 2025-06-16 ASSESSMENT — ENCOUNTER SYMPTOMS
BACK PAIN: 0
SINUS PAIN: 0
DIARRHEA: 0
VOMITING: 0
SHORTNESS OF BREATH: 0
SORE THROAT: 0
NAUSEA: 0
EYE PAIN: 0
ABDOMINAL PAIN: 0
COUGH: 0

## 2025-06-16 NOTE — PROGRESS NOTES
[facility]  [facility]  [address] [address]  Dept: [phone]  Dept Fax: [phone]    Arturo Todd is a 59 y.o. male who presents today for his medicalconditions/complaints as noted below.  Arturo Todd is c/o of Diabetes (3 month follow up )      HPI:     59-year-old male patient presents with complaints of follow up     Hx of NSTEMI, CAD, follows with [facility] Card[facility] Dr. Vasquez. Medically managed with  aspirin, coreg 6.2 bid.   BP and HR stable. Hyperlipidemia, CAD currently managed with atorvastatin 40, last lipid level improved, and liver enzymes stable - No recent chest pain or shortness of breath     History of type 2 diabetes.  Currently taking Metformin 1000 bid,  jardiance 25.  last A1c 8.0 today 6.8. Has been actively working on diet and activity. Urine micro negative. Has previously done diabetic education.           Past Medical History:   Diagnosis Date    DM (diabetes mellitus) ([facility])     Hyperlipidemia     MI (myocardial infarction) ([facility])         Current Outpatient Medications   Medication Sig Dispense Refill    empagliflozin (JARDIANCE) 25 MG tablet Take 1 tablet by mouth daily 90 tablet 1    metFORMIN (GLUCOPHAGE-XR) 500 MG extended release tablet Take 2 tablets by mouth 2 times daily (with meals) 360 tablet 1    carvedilol (COREG) 6.25 MG tablet Take 1 tablet by mouth 2 times daily (with meals) with meals 60 tablet 5    aspirin 81 MG EC tablet Take 1 tablet by mouth daily 90 tablet 1    atorvastatin (LIPITOR) 40 MG tablet Take 1 tablet by mouth nightly 90 tablet 1    meloxicam (MOBIC) 15 MG tablet Take 1 tablet by mouth daily (Patient not taking: Reported on 6/16/2025) 30 tablet 0    sildenafil (VIAGRA) 25 MG tablet Take 1 tablet by mouth as needed for Erectile Dysfunction 30 tablet 0     No current facility-administered medications for this visit.     No Known Allergies    Subjective:      Review of Systems   Constitutional:

## 2025-06-19 ENCOUNTER — RESULTS FOLLOW-UP (OUTPATIENT)
Dept: FAMILY MEDICINE CLINIC | Age: 60
End: 2025-06-19

## 2025-06-19 ENCOUNTER — HOSPITAL ENCOUNTER (OUTPATIENT)
Age: 60
Setting detail: SPECIMEN
Discharge: HOME | End: 2025-06-19

## 2025-06-19 DIAGNOSIS — Z12.5 PROSTATE CANCER SCREENING: ICD-10-CM

## 2025-06-19 DIAGNOSIS — E78.5 HYPERLIPIDEMIA, UNSPECIFIED HYPERLIPIDEMIA TYPE: ICD-10-CM

## 2025-06-19 LAB
ALBUMIN SERPL-MCNC: 4.5 G/DL (ref 3.5–5.2)
ALBUMIN/GLOB SERPL: 1.7 {RATIO} (ref 1–2.5)
ALP SERPL-CCNC: 79 U/L (ref 40–129)
ALT SERPL-CCNC: 24 U/L (ref 10–50)
AST SERPL-CCNC: 17 U/L (ref 10–50)
BILIRUB DIRECT SERPL-MCNC: 0.2 MG/DL (ref 0–0.2)
BILIRUB INDIRECT SERPL-MCNC: 0.5 MG/DL (ref 0–1)
BILIRUB SERPL-MCNC: 0.7 MG/DL (ref 0–1.2)
CHOLEST SERPL-MCNC: 179 MG/DL (ref 0–199)
CHOLESTEROL/HDL RATIO: 4.5
GLOBULIN SER CALC-MCNC: 2.7 G/DL
HDLC SERPL-MCNC: 40 MG/DL
LDLC SERPL CALC-MCNC: 108 MG/DL (ref 0–100)
PROT SERPL-MCNC: 7.2 G/DL (ref 6.6–8.7)
PSA SERPL-MCNC: 1.37 NG/ML (ref 0–4)
TRIGL SERPL-MCNC: 154 MG/DL
VLDLC SERPL CALC-MCNC: 31 MG/DL (ref 1–30)

## 2025-08-04 DIAGNOSIS — I21.4 NSTEMI (NON-ST ELEVATED MYOCARDIAL INFARCTION) (HCC): ICD-10-CM

## 2025-08-04 RX ORDER — CARVEDILOL 6.25 MG/1
6.25 TABLET ORAL 2 TIMES DAILY WITH MEALS
Qty: 60 TABLET | Refills: 5 | Status: SHIPPED | OUTPATIENT
Start: 2025-08-04

## 2025-08-11 DIAGNOSIS — E11.9 DIABETES MELLITUS WITHOUT COMPLICATION (HCC): ICD-10-CM

## 2025-08-11 RX ORDER — METFORMIN HYDROCHLORIDE 500 MG/1
1000 TABLET, EXTENDED RELEASE ORAL 2 TIMES DAILY WITH MEALS
Qty: 360 TABLET | Refills: 1 | Status: SHIPPED | OUTPATIENT
Start: 2025-08-11